# Patient Record
Sex: FEMALE | Race: WHITE | Employment: OTHER | URBAN - METROPOLITAN AREA
[De-identification: names, ages, dates, MRNs, and addresses within clinical notes are randomized per-mention and may not be internally consistent; named-entity substitution may affect disease eponyms.]

---

## 2017-01-05 ENCOUNTER — OFFICE VISIT (OUTPATIENT)
Dept: FAMILY MEDICINE CLINIC | Age: 62
End: 2017-01-05

## 2017-01-05 VITALS
BODY MASS INDEX: 34 KG/M2 | OXYGEN SATURATION: 96 % | DIASTOLIC BLOOD PRESSURE: 81 MMHG | TEMPERATURE: 98.6 F | WEIGHT: 173.2 LBS | SYSTOLIC BLOOD PRESSURE: 124 MMHG | RESPIRATION RATE: 14 BRPM | HEIGHT: 60 IN | HEART RATE: 78 BPM

## 2017-01-05 DIAGNOSIS — I10 ESSENTIAL HYPERTENSION: ICD-10-CM

## 2017-01-05 DIAGNOSIS — E78.5 HYPERLIPIDEMIA LDL GOAL <100: ICD-10-CM

## 2017-01-05 DIAGNOSIS — R73.02 GLUCOSE INTOLERANCE (IMPAIRED GLUCOSE TOLERANCE): ICD-10-CM

## 2017-01-05 DIAGNOSIS — Z11.59 NEED FOR HEPATITIS C SCREENING TEST: ICD-10-CM

## 2017-01-05 DIAGNOSIS — Z00.00 WELL ADULT EXAM: Primary | ICD-10-CM

## 2017-01-05 DIAGNOSIS — R63.5 ABNORMAL WEIGHT GAIN: ICD-10-CM

## 2017-01-05 DIAGNOSIS — Z12.11 SCREENING FOR COLORECTAL CANCER: ICD-10-CM

## 2017-01-05 DIAGNOSIS — Z12.12 SCREENING FOR COLORECTAL CANCER: ICD-10-CM

## 2017-01-05 NOTE — MR AVS SNAPSHOT
Visit Information Date & Time Provider Department Dept. Phone Encounter #  
 1/5/2017  8:00 AM Ar Mondragon, [address] [facility] [phone] [id number] Follow-up Instructions Return in about 2 months (around 3/5/2017) for weight management with Dr. Hallie Cardona. Upcoming Health Maintenance Date Due ZOSTER VACCINE AGE 60> 10/14/2015 COLONOSCOPY 6/13/2017 BREAST CANCER SCRN MAMMOGRAM 8/19/2018 DTaP/Tdap/Td series (2 - Td) 1/14/2019 PAP AKA CERVICAL CYTOLOGY 1/5/2020 Allergies as of 1/5/2017  Review Complete On: 1/5/2017 By: Ar Mondragon, DO No Known Allergies Current Immunizations  Reviewed on 5/14/2013 Name Date Influenza Vaccine 11/22/2016, 12/18/2012 TDAP Vaccine 1/14/2009 Not reviewed this visit You Were Diagnosed With   
  
 Codes Comments Well adult exam    -  Primary ICD-10-CM: Z00.00 ICD-9-CM: V70.0 Need for hepatitis C screening test     ICD-10-CM: Z11.59 
ICD-9-CM: V73.89 Glucose intolerance (impaired glucose tolerance)     ICD-10-CM: R73.02 
ICD-9-CM: 790.22 Hyperlipidemia LDL goal <100     ICD-10-CM: E78.5 ICD-9-CM: 272.4 Essential hypertension     ICD-10-CM: I10 
ICD-9-CM: 401.9 Abnormal weight gain     ICD-10-CM: R63.5 ICD-9-CM: 783.1 Screening for colorectal cancer     ICD-10-CM: Z12.11, Z12.12 
ICD-9-CM: V76.51 Vitals BP Pulse Temp Resp Height(growth percentile) Weight(growth percentile) 124/81 (BP 1 Location: Left arm, BP Patient Position: Sitting) 78 98.6 °F (37 °C) (Oral) 14 5' (1.524 m) 173 lb 3.2 oz (78.6 kg) LMP SpO2 BMI OB Status Smoking Status 07/23/2013 96% 33.83 kg/m2 Postmenopausal Never Smoker Vitals History BMI and BSA Data Body Mass Index Body Surface Area  
 33.83 kg/m 2 1.82 m 2 Preferred Pharmacy Pharmacy Name Phone [id number] [address] [place] [address] 9082 [phone] Your Updated Medication List  
  
   
This list is accurate as of: 1/5/17  8:42 AM.  Always use your most recent med list.  
  
  
  
  
 aspirin 81 mg chewable tablet Take 81 mg by mouth daily. Indications: MYOCARDIAL INFARCTION PREVENTION  
  
 FISH OIL PO Take  by mouth daily. GLUCOSAMINE-CONDROITIN-HRB#182 PO Take 3,000 mg by mouth daily. MOTRIN  mg tablet Generic drug:  ibuprofen Take 600 mg by mouth every six (6) hours as needed. Indications: PAIN  
  
 naltrexone-buPROPion 8-90 mg Tber ER tablet Commonly known as:  Marguarite Arthur Take 2 Tabs by mouth two (2) times a day. simvastatin 40 mg tablet Commonly known as:  ZOCOR  
TAKE 1 TABLET BY MOUTH NIGHTLY.  
  
 valsartan-hydroCHLOROthiazide 320-12.5 mg per tablet Commonly known as:  DIOVAN-HCT  
TAKE 1 TABLET BY MOUTH EVERY DAY Prescriptions Sent to Pharmacy Refills  
 naltrexone-buPROPion (CONTRAVE) 8-90 mg TbER ER tablet 2 Sig: Take 2 Tabs by mouth two (2) times a day. Class: Normal  
 Pharmacy: [id number] Shaknar Francisco, [address] [facility] #: [phone] Route: Oral  
  
Follow-up Instructions Return in about 2 months (around 3/5/2017) for weight management with Dr. Rivera Roberts. To-Do List   
 09/06/2017 7:30 AM  
  Appointment with [address] [place] [address] 1 at [address] [place] [address] ([phone]) Shower or bathe using soap and water. Do not use deodorant, powder, perfumes, or lotion the day of your exam.  If your prior mammograms were not performed at [facility] 6 please bring films with you or forward prior images 2 days before your procedure. Check in at registration 15min before your appointment time unless you were instructed to do otherwise. A script is not necessary, but if you have one, please bring it on the day of the mammogram or have it faxed to the department. [facility] [phone] [facility]  [phone] [facility] [address] [place]  [phone] [facility] [phone] [facility] 1150 [facility] [phone] Referral Information Referral ID Referred By Referred To  
  
 [id number] Leslie Sue Not Available Visits Status Start Date End Date 1 New Request 1/5/17 1/5/18 If your referral has a status of pending review or denied, additional information will be sent to support the outcome of this decision. Patient Instructions Well Visit, Women 48 to 72: Care Instructions Your Care Instructions Physical exams can help you stay healthy. Your doctor has checked your overall health and may have suggested ways to take good care of yourself. He or she also may have recommended tests. At home, you can help prevent illness with healthy eating, regular exercise, and other steps. Follow-up care is a key part of your treatment and safety. Be sure to make and go to all appointments, and call your doctor if you are having problems. It's also a good idea to know your test results and keep a list of the medicines you take. How can you care for yourself at home? · Reach and stay at a healthy weight. This will lower your risk for many problems, such as obesity, diabetes, heart disease, and high blood pressure. · Get at least 30 minutes of exercise on most days of the week. Walking is a good choice. You also may want to do other activities, such as running, swimming, cycling, or playing tennis or team sports. · Do not smoke. Smoking can make health problems worse. If you need help quitting, talk to your doctor about stop-smoking programs and medicines. These can increase your chances of quitting for good. · Protect your skin from too much sun. When you're outdoors from 10 a.m. to 4 p.m., stay in the shade or cover up with clothing and a hat with a wide brim. Wear sunglasses that block UV rays.  Even when it's cloudy, put broad-spectrum sunscreen (SPF 30 or higher) on any exposed skin. · See a dentist one or two times a year for checkups and to have your teeth cleaned. · Wear a seat belt in the car. · Limit alcohol to 1 drink a day. Too much alcohol can cause health problems. Follow your doctor's advice about when to have certain tests. These tests can spot problems early. · Cholesterol. Your doctor will tell you how often to have this done based on your age, family history, or other things that can increase your risk for heart attack and stroke. · Blood pressure. Have your blood pressure checked during a routine doctor visit. Your doctor will tell you how often to check your blood pressure based on your age, your blood pressure results, and other factors. · Mammogram. Ask your doctor how often you should have a mammogram, which is an X-ray of your breasts. A mammogram can spot breast cancer before it can be felt and when it is easiest to treat. · Pap test and pelvic exam. Ask your doctor how often you should have a Pap test. You may not need to have a Pap test as often as you used to. · Vision. Have your eyes checked every year or two or as often as your doctor suggests. Some experts recommend that you have yearly exams for glaucoma and other age-related eye problems starting at age 48. · Hearing. Tell your doctor if you notice any change in your hearing. You can have tests to find out how well you hear. · Diabetes. Ask your doctor whether you should have tests for diabetes. · Colon cancer. You should begin tests for colon cancer at age 48. You may have one of several tests. Your doctor will tell you how often to have tests based on your age and risk. Risks include whether you already had a precancerous polyp removed from your colon or whether your parents, sisters and brothers, or children have had colon cancer. · Thyroid disease.  Talk to your doctor about whether to have your thyroid checked as part of a regular physical exam. Women have an increased chance of a thyroid problem. · Osteoporosis. You should begin tests for bone density at age 72. If you are younger than 72, ask your doctor whether you have factors that may increase your risk for this disease. You may want to have this test before age 72. · Heart attack and stroke risk. At least every 4 to 6 years, you should have your risk for heart attack and stroke assessed. Your doctor uses factors such as your age, blood pressure, cholesterol, and whether you smoke or have diabetes to show what your risk for a heart attack or stroke is over the next 10 years. When should you call for help? Watch closely for changes in your health, and be sure to contact your doctor if you have any problems or symptoms that concern you. Where can you learn more? Go to http://brett-nataliya.info/. Enter F073 in the search box to learn more about \"Well Visit, Women 50 to 72: Care Instructions. \" Current as of: July 19, 2016 Content Version: 11.1 © 7103-1340 [employer]rec. Care instructions adapted under license by [employer] (which disclaims liability or warranty for this information). If you have questions about a medical condition or this instruction, always ask your healthcare professional. [place] [address] any warranty or liability for your use of this information. Introducing [facility]! Dear Vinod Funes: Thank you for requesting a Circular account. Our records indicate that you already have an active Circular account. You can access your account anytime at [email or web address] Did you know that you can access your hospital and ER discharge instructions at any time in Circular? You can also review all of your test results from your hospital stay or ER visit. Additional Information If you have questions, please visit the Frequently Asked Questions section of the [facility] website at [email or web address] Remember, [facility] is NOT to be used for urgent needs. For medical emergencies, dial 911. Now available from your iPhone and Android! Please provide this summary of care documentation to your next provider. Your primary care clinician is listed as Matteo Rea. If you have any questions after today's visit, please call [phone].

## 2017-01-05 NOTE — PROGRESS NOTES
SUBJECTIVE:   64 y.o. female for annual checkup AND follow up to chronic conditions. Patients last menstrual period was  Patient's last menstrual period was 07/23/2013. Last PAP: 5/2013, NIL with neg HPV, by Dr. Veronica Gallegos    OBGYN: Sanjay Montague     History of abnormal: none    Had [facility] 12/2015 year for lower abdominal pain, cytology was negative    Saw urology, OBGYN; was due to cystitis     Family history of ovarian, uterine or cervical cancer: none    Next due: per OBGYN    Genito-Urinary ROS: no dysuria, trouble voiding, or hematuria    Last Mammogram: 8/29/2016    FINDINGS:   BREAST COMPOSITION: There are scattered fibroglandular densities   (approximately 25-50% glandular).     There is mild asymmetry. There are no new dominant masses, clustered   calcifications, skin changes or nipple changes which suggest malignancy.        IMPRESSION:  1. BIRADS ASSESSMENT CATEGORY 2, benign. 2. Followup mammography is recommended annually over 40, unless earlier   suggested clinically. 3. These findings will be relayed to the patient promptly. Family history of breast cancer: none      Last DEXA: 8/19/2015    IMPRESSION:     This patient is osteopenic using the World Health Organization criteria  As compared to the prior study, there has been no statistically significant   change.   10 year probability of major osteoporotic fracture: 8.4%  10 year probability of hip fracture: 0.8%    History of abnormal DEXA: yes, but has not needed or met guidelines/recommendations for medication therapy yet    On calcium and vitamin D: no  Family history of osteoporosis: not sure     Daily weight bearing activity: yes      History of STD: none    STD risk factors:   Lifetime sexual partners: 2  Men only   No history of IV drug use or blood transfusion    Hep C screening in past ([facility]): has not had in the past     Last Colonoscopy: 6/13/2007, \"normal\" per EMR report  Next due: 6/2017    Family history of colon cancer or polyp disease: none    Gastrointestinal ROS: no abdominal pain, change in bowel habits, or black or bloody stools    OTHER concerns or chronic conditions:    1.2.3   HTN and HLD and weight gain     Diet and Lifestyle: got new job, brother , lost insurance at a period of time; has insurance again and ready to get back on track so restarted Contrave at last visit     Very isolated in [place], previously on [employer] watchers      Living in short pump     Has gym at work; working out during lunch and body pump T and TR     Home BP Monitoring: is well controlled at home     Cardiovascular ROS: taking medications as instructed, no medication side effects noted, no TIA's, no chest pain on exertion, no dyspnea on exertion, no swelling of ankles.      Weight gain     Wt Readings from Last 3 Encounters:   17 173 lb 3.2 oz (78.6 kg)   16 175 lb 3.2 oz (79.5 kg)   10/18/16 182 lb 6.4 oz (82.7 kg)       Patient previously on contrave, stopped this because felt that was not working for her, was not suppressing appetite in the past.      Restarted contrave on 10/18/16     Start weight: 182 lbs  Current weight 173 lbs  Weight loss in last 4 weeks:2 lbs  Total weight loss: 9 lbs with contrave (5% of start weight)     Current dose: 2 tabs PO BID     Tolerating well and denies adverse SE    Medications for above conditions:     Medication Sig    naltrexone-buPROPion (CONTRAVE) 8-90 mg TbER ER tablet Take 2 Tabs by mouth two (2) times a day.  valsartan-hydrochlorothiazide (DIOVAN-HCT) 320-12.5 mg per tablet TAKE 1 TABLET BY MOUTH EVERY DAY    simvastatin (ZOCOR) 40 mg tablet TAKE 1 TABLET BY MOUTH NIGHTLY.  aspirin 81 mg chewable tablet Take 81 mg by mouth daily.  Indications: MYOCARDIAL INFARCTION PREVENTION       Reviewed following labs with patient:     Lab Results  Component Value Date/Time   Hemoglobin A1c 5.9 10/22/2016 08:47 AM   Hemoglobin A1c 5.7 2016 05:51 PM   Hemoglobin A1c 5.9 07/07/2015 09:35 AM   Glucose 114 10/22/2016 08:47 AM   LDL, calculated 125 10/22/2016 08:47 AM   Creatinine 0.95 10/22/2016 08:47 AM      Lab Results  Component Value Date/Time   ALT 32 10/22/2016 08:47 AM   AST 32 10/22/2016 08:47 AM   Alk. phosphatase 63 10/22/2016 08:47 AM   Bilirubin, total 0.9 10/22/2016 08:47 AM        Due for repeat A1C today      Past Medical History   Diagnosis Date    Basal cell carcinoma      Dr Oscar Mata toe 04/20/2015     Great toe of L foot     Degenerative arthritis of knee      L  Li/os    DUB (dysfunctional uterine bleeding) 2005    Hypercholesterolemia      heart scan ca 0 2004    Hyperglycemia     Hypertension     Osteopenia 2/11     repeat  DEXA 2/13    Unspecified vitamin D deficiency 6/10     Current Outpatient Prescriptions   Medication Sig Dispense Refill    naltrexone-buPROPion (CONTRAVE) 8-90 mg TbER ER tablet Take 2 Tabs by mouth two (2) times a day. 120 Tab 2    valsartan-hydrochlorothiazide (DIOVAN-HCT) 320-12.5 mg per tablet TAKE 1 TABLET BY MOUTH EVERY DAY 90 Tab 3    simvastatin (ZOCOR) 40 mg tablet TAKE 1 TABLET BY MOUTH NIGHTLY. 90 Tab 3    GLUCOSAMINE-CONDROITIN-HRB#182 PO Take 3,000 mg by mouth daily.  ibuprofen (MOTRIN IB) 200 mg tablet Take 600 mg by mouth every six (6) hours as needed. Indications: PAIN      aspirin 81 mg chewable tablet Take 81 mg by mouth daily. Indications: MYOCARDIAL INFARCTION PREVENTION      DOCOSAHEXANOIC ACID/EPA (FISH OIL PO) Take  by mouth daily. Allergies: Review of patient's allergies indicates no known allergies. OBJECTIVE:   The patient appears well, alert, oriented x 3, in no distress. Visit Vitals    /81 (BP 1 Location: Left arm, BP Patient Position: Sitting)    Pulse 78    Temp 98.6 °F (37 °C) (Oral)    Resp 14    Ht 5' (1.524 m)    Wt 173 lb 3.2 oz (78.6 kg)    LMP 07/23/2013    SpO2 96%    BMI 33.83 kg/m2     ENT: MMM. Neck: supple.  No adenopathy or thyromegaly. Eyes: SHELIA. Pulm: Lungs are clear, good air entry, no wheezes, rhonchi or rales. CV: S1 and S2 normal, no murmurs, regular rate and rhythm. Lymph: show no edema  Vasc: normal peripheral pulses. Neurological is normal, no focal findings. CN 2-12 intact    ASSESSMENT/PLAN:       ICD-10-CM ICD-9-CM    1. Well adult exam Z00.00 V70.0 HCV AB W/RFLX TO KIKI      REFERRAL FOR COLONOSCOPY   2. Need for hepatitis C screening test Z11.59 V73.89 HCV AB W/RFLX TO KIKI   3. Glucose intolerance (impaired glucose tolerance) R73.02 790.22 HEMOGLOBIN A1C WITH EAG   4. Hyperlipidemia LDL goal <100 E78.5 272.4    5. Essential hypertension I10 401.9    6. Abnormal weight gain R63.5 783.1 naltrexone-buPROPion (CONTRAVE) 8-90 mg TbER ER tablet  Refilled today    Patient has lost 5% of start body weight which is indication to continue medication. It is advise that she continues on this medication until she reaches BMI of <25, she can additionally use this medication long term for managing obesity as long as weight is either stable or decreasing. Discussed that she should continue healthy diet, exercise. 7. Screening for colorectal cancer Z12.11 V76.51 REFERRAL FOR COLONOSCOPY    Z12.12       RX given for Zostavax. Counseled on risk and benefit of this vaccine. Next due:     PAP: 5/2018  Mammogram: 8/2017 (is already set up for 9/2017)  DEXA: 8/2017  Colonoscopy: 7/2017    Follow-up Disposition:  Return in about 2 months (around 3/5/2017) for weight management with Dr. Clarisa Estrada. Dr. Ashley Wise D.O.   Physician

## 2017-01-05 NOTE — PATIENT INSTRUCTIONS
Well Visit, Women 48 to 72: Care Instructions  Your Care Instructions  Physical exams can help you stay healthy. Your doctor has checked your overall health and may have suggested ways to take good care of yourself. He or she also may have recommended tests. At home, you can help prevent illness with healthy eating, regular exercise, and other steps. Follow-up care is a key part of your treatment and safety. Be sure to make and go to all appointments, and call your doctor if you are having problems. It's also a good idea to know your test results and keep a list of the medicines you take. How can you care for yourself at home? · Reach and stay at a healthy weight. This will lower your risk for many problems, such as obesity, diabetes, heart disease, and high blood pressure. · Get at least 30 minutes of exercise on most days of the week. Walking is a good choice. You also may want to do other activities, such as running, swimming, cycling, or playing tennis or team sports. · Do not smoke. Smoking can make health problems worse. If you need help quitting, talk to your doctor about stop-smoking programs and medicines. These can increase your chances of quitting for good. · Protect your skin from too much sun. When you're outdoors from 10 a.m. to 4 p.m., stay in the shade or cover up with clothing and a hat with a wide brim. Wear sunglasses that block UV rays. Even when it's cloudy, put broad-spectrum sunscreen (SPF 30 or higher) on any exposed skin. · See a dentist one or two times a year for checkups and to have your teeth cleaned. · Wear a seat belt in the car. · Limit alcohol to 1 drink a day. Too much alcohol can cause health problems. Follow your doctor's advice about when to have certain tests. These tests can spot problems early. · Cholesterol.  Your doctor will tell you how often to have this done based on your age, family history, or other things that can increase your risk for heart attack and stroke. · Blood pressure. Have your blood pressure checked during a routine doctor visit. Your doctor will tell you how often to check your blood pressure based on your age, your blood pressure results, and other factors. · Mammogram. Ask your doctor how often you should have a mammogram, which is an X-ray of your breasts. A mammogram can spot breast cancer before it can be felt and when it is easiest to treat. · Pap test and pelvic exam. Ask your doctor how often you should have a Pap test. You may not need to have a Pap test as often as you used to. · Vision. Have your eyes checked every year or two or as often as your doctor suggests. Some experts recommend that you have yearly exams for glaucoma and other age-related eye problems starting at age 48. · Hearing. Tell your doctor if you notice any change in your hearing. You can have tests to find out how well you hear. · Diabetes. Ask your doctor whether you should have tests for diabetes. · Colon cancer. You should begin tests for colon cancer at age 48. You may have one of several tests. Your doctor will tell you how often to have tests based on your age and risk. Risks include whether you already had a precancerous polyp removed from your colon or whether your parents, sisters and brothers, or children have had colon cancer. · Thyroid disease. Talk to your doctor about whether to have your thyroid checked as part of a regular physical exam. Women have an increased chance of a thyroid problem. · Osteoporosis. You should begin tests for bone density at age 72. If you are younger than 72, ask your doctor whether you have factors that may increase your risk for this disease. You may want to have this test before age 72. · Heart attack and stroke risk. At least every 4 to 6 years, you should have your risk for heart attack and stroke assessed.  Your doctor uses factors such as your age, blood pressure, cholesterol, and whether you smoke or have diabetes to show what your risk for a heart attack or stroke is over the next 10 years. When should you call for help? Watch closely for changes in your health, and be sure to contact your doctor if you have any problems or symptoms that concern you. Where can you learn more? Go to http://brett-nataliya.info/. Enter C856 in the search box to learn more about \"Well Visit, Women 50 to 72: Care Instructions. \"  Current as of: July 19, 2016  Content Version: 11.1  © 5172-9559 [employer]. Care instructions adapted under license by [employer] (which disclaims liability or warranty for this information). If you have questions about a medical condition or this instruction, always ask your healthcare professional. [address] any warranty or liability for your use of this information.

## 2017-01-05 NOTE — PROGRESS NOTES
Chief Complaint   Patient presents with    Complete Physical       1. Have you been to the ER, urgent care clinic since your last visit? Hospitalized since your last visit? No    2. Have you seen or consulted any other health care providers outside of the [address] [place] [address] since your last visit? Include any pap smears or colon screening. No    Body mass index is 33.83 kg/(m^2).

## 2017-01-06 LAB
EST. AVERAGE GLUCOSE BLD GHB EST-MCNC: 131 MG/DL
HBA1C MFR BLD: 6.2 % (ref 4.8–5.6)
HCV AB S/CO SERPL IA: <0.1 S/CO RATIO (ref 0–0.9)
HCV AB SERPL QL IA: NORMAL

## 2017-01-06 RX ORDER — METFORMIN HYDROCHLORIDE 500 MG/1
500 TABLET, EXTENDED RELEASE ORAL
Qty: 90 TAB | Refills: 1 | Status: SHIPPED | OUTPATIENT
Start: 2017-01-06 | End: 2017-07-07 | Stop reason: SDUPTHER

## 2017-01-06 NOTE — PROGRESS NOTES
A1C (marker for diabetes) rising even with her weight loss, which means that she may progress toward diabetes even while losing weight. I recommend adding on metformin at this point to stop progression to diabetes and this will help some with weight loss (additional 4% of body weight on average). Is she willing to do this?       CV

## 2017-01-06 NOTE — PROGRESS NOTES
Adding METFORMIN  mg PO with dinner     A1C (marker for diabetes) rising even with her weight loss, which means that she may progress toward diabetes even while losing weight. I recommend adding on metformin at this point to stop progression to diabetes and this will help some with weight loss (additional 4% of body weight on average). Repeat A1C at next visit     Dr. Marcelino Dukes D.O.   Physician

## 2017-01-06 NOTE — PROGRESS NOTES
Advised patient of results and recommendations, patient understood and agreed to understanding. Patient is agreeable to starting Metformin,[facility] p[facility] to you.

## 2017-01-06 NOTE — PROGRESS NOTES
Attempted to reach patient, left voicemail to call the office to review results and recommendations.

## 2017-02-22 ENCOUNTER — HOSPITAL ENCOUNTER (EMERGENCY)
Age: 62
Discharge: HOME | End: 2017-02-22
Attending: FAMILY MEDICINE

## 2017-02-22 VITALS
DIASTOLIC BLOOD PRESSURE: 93 MMHG | BODY MASS INDEX: 34.36 KG/M2 | OXYGEN SATURATION: 99 % | HEIGHT: 60 IN | WEIGHT: 175 LBS | TEMPERATURE: 98.4 F | SYSTOLIC BLOOD PRESSURE: 145 MMHG | HEART RATE: 73 BPM | RESPIRATION RATE: 16 BRPM

## 2017-02-22 DIAGNOSIS — H10.021 PINK EYE, RIGHT: Primary | ICD-10-CM

## 2017-02-22 RX ORDER — SULFACETAMIDE SODIUM 100 MG/ML
1-2 SOLUTION/ DROPS OPHTHALMIC EVERY 4 HOURS
Qty: 1 BOTTLE | Refills: 0 | Status: SHIPPED | OUTPATIENT
Start: 2017-02-22 | End: 2017-02-27

## 2017-02-22 NOTE — DISCHARGE INSTRUCTIONS
Pinkeye: Care Instructions  Your Care Instructions    Pinkeye is redness and swelling of the eye surface and the conjunctiva (the lining of the eyelid and the covering of the white part of the eye). Pinkeye is also called conjunctivitis. Pinkeye is often caused by infection with bacteria or a virus. Dry air, allergies, smoke, and chemicals are other common causes. Pinkeye often clears on its own in 7 to 10 days. Antibiotics only help if the pinkeye is caused by bacteria. Pinkeye caused by infection spreads easily. If an allergy or chemical is causing pinkeye, it will not go away unless you can avoid whatever is causing it. Follow-up care is a key part of your treatment and safety. Be sure to make and go to all appointments, and call your doctor if you are having problems. Its also a good idea to know your test results and keep a list of the medicines you take. How can you care for yourself at home? · Wash your hands often. Always wash them before and after you treat pinkeye or touch your eyes or face. · Use moist cotton or a clean, wet cloth to remove crust. Wipe from the inside corner of the eye to the outside. Use a clean part of the cloth for each wipe. · Put cold or warm wet cloths on your eye a few times a day if the eye hurts. · Do not wear contact lenses or eye makeup until the pinkeye is gone. Throw away any eye makeup you were using when you got pinkeye. Clean your contacts and storage case. If you wear disposable contacts, use a new pair when your eye has cleared and it is safe to wear contacts again. · If the doctor gave you antibiotic ointment or eyedrops, use them as directed. Use the medicine for as long as instructed, even if your eye starts looking better soon. Keep the bottle tip clean, and do not let it touch the eye area. · To put in eyedrops or ointment:  ¨ Tilt your head back, and pull your lower eyelid down with one finger.   ¨ Drop or squirt the medicine inside the lower lid.  ¨ Close your eye for 30 to 60 seconds to let the drops or ointment move around. ¨ Do not touch the ointment or dropper tip to your eyelashes or any other surface. · Do not share towels, pillows, or washcloths while you have pinkeye. When should you call for help? Call your doctor now or seek immediate medical care if:  · You have pain in your eye, not just irritation on the surface. · You have a change in vision or loss of vision. · You have an increase in discharge from the eye. · Your eye has not started to improve or begins to get worse within 48 hours after you start using antibiotics. · Pinkeye lasts longer than 7 days. Watch closely for changes in your health, and be sure to contact your doctor if you have any problems. Where can you learn more? Go to http://brett-nataliya.info/. Enter Y392 in the search box to learn more about \"Pinkeye: Care Instructions. \"  Current as of: May 27, 2016  Content Version: 11.1  © 3758-6892 Healthwise, Incorporated. Care instructions adapted under license by [employer] (which disclaims liability or warranty for this information). If you have questions about a medical condition or this instruction, always ask your healthcare professional. [address] any warranty or liability for your use of this information.

## 2017-02-22 NOTE — LETTER
[facility] 
[address] [place] [address] 
[phone] Work/School Note Date: 2/22/2017 To Whom It May concern: 
 
Levon Dominique was seen and treated today in the emergency room by the following provider(s): 
Attending Provider: Marisol Davila MD 
Physician Assistant: Gordo Mccray. Levon Dominique may return to work on 02/24/17. Sincerely, Gordo Mccray

## 2017-02-22 NOTE — UC PROVIDER NOTE
Patient is a 64 y.o. female presenting with conjunctivitis. The history is provided by the patient. Pink Eye    This is a new problem. The current episode started 3 to 5 hours ago. The problem occurs constantly. The problem has been gradually worsening. The right eye is affected. The injury mechanism was none. The patient is experiencing no pain. There is no history of trauma to the eye. There is no known exposure to pink eye. She does not wear contacts. Associated symptoms include discharge, foreign body sensation and eye redness. Pertinent negatives include no blurred vision.         Past Medical History:   Diagnosis Date    Basal cell carcinoma     Dr Aaliyah Sesay toe 04/20/2015    Great toe of L foot     Degenerative arthritis of knee     L  Li/os    DUB (dysfunctional uterine bleeding) 2005    Hypercholesterolemia     heart scan ca 0 2004    Hyperglycemia     Hypertension     Osteopenia 2/11    repeat  DEXA 2/13    Unspecified vitamin D deficiency 6/10        Past Surgical History:   Procedure Laterality Date    COLONOSCOPY  2007    repeat 2017    HX DILATION AND CURETTAGE Bilateral          Family History   Problem Relation Age of Onset    Elevated Lipids Mother     Hypertension Mother     Cancer Maternal Grandmother      cervical    Diabetes Maternal Grandmother     Heart Disease Maternal Grandfather      MI        Social History     Social History    Marital status:      Spouse name: N/A    Number of children: N/A    Years of education: N/A     Occupational History     [employer]     Social History Main Topics    Smoking status: Never Smoker    Smokeless tobacco: Never Used    Alcohol use 0.6 oz/week     1 Glasses of wine per week      Comment: rarely    Drug use: No    Sexual activity: Yes     Partners: Male     Birth control/ protection: None     Other Topics Concern    Exercise Yes     exercises regularly     Social History Narrative ALLERGIES: Review of patient's allergies indicates no known allergies. Review of Systems   Constitutional: Negative for chills. Eyes: Positive for discharge and redness. Negative for blurred vision. Vitals:    02/22/17 0820 02/22/17 0822   BP:  (!) 145/93   Pulse:  73   Resp:  16   Temp:  98.4 °F (36.9 °C)   SpO2:  99%   Weight: 79.4 kg (175 lb)    Height: 5' (1.524 m)        Physical Exam   Constitutional: She is oriented to person, place, and time. She appears well-developed and well-nourished. HENT:   Right Ear: External ear normal.   Left Ear: External ear normal.   Eyes:   Right conjunctive injected   Pulmonary/Chest: Effort normal.   Neurological: She is alert and oriented to person, place, and time. Skin: Skin is warm and dry. Psychiatric: She has a normal mood and affect. Her behavior is normal. Judgment and thought content normal.   Nursing note and vitals reviewed. MDM     Differential Diagnosis; Clinical Impression; Plan:     CLINICAL IMPRESSION:  Pink eye, right  (primary encounter diagnosis)    Plan:  1. Bleph-10  2.   3.   Risk of Significant Complications, Morbidity, and/or Mortality:   Presenting problems: Moderate  Diagnostic procedures: Moderate  Management options:   Moderate  Progress:   Patient progress:  Stable      Procedures

## 2017-02-23 ENCOUNTER — PATIENT OUTREACH (OUTPATIENT)
Dept: FAMILY MEDICINE CLINIC | Age: 62
End: 2017-02-23

## 2017-02-23 NOTE — PATIENT INSTRUCTIONS
Pinkeye: Care Instructions  Your Care Instructions    Pinkeye is redness and swelling of the eye surface and the conjunctiva (the lining of the eyelid and the covering of the white part of the eye). Pinkeye is also called conjunctivitis. Pinkeye is often caused by infection with bacteria or a virus. Dry air, allergies, smoke, and chemicals are other common causes. Pinkeye often clears on its own in 7 to 10 days. Antibiotics only help if the pinkeye is caused by bacteria. Pinkeye caused by infection spreads easily. If an allergy or chemical is causing pinkeye, it will not go away unless you can avoid whatever is causing it. Follow-up care is a key part of your treatment and safety. Be sure to make and go to all appointments, and call your doctor if you are having problems. Its also a good idea to know your test results and keep a list of the medicines you take. How can you care for yourself at home? · Wash your hands often. Always wash them before and after you treat pinkeye or touch your eyes or face. · Use moist cotton or a clean, wet cloth to remove crust. Wipe from the inside corner of the eye to the outside. Use a clean part of the cloth for each wipe. · Put cold or warm wet cloths on your eye a few times a day if the eye hurts. · Do not wear contact lenses or eye makeup until the pinkeye is gone. Throw away any eye makeup you were using when you got pinkeye. Clean your contacts and storage case. If you wear disposable contacts, use a new pair when your eye has cleared and it is safe to wear contacts again. · If the doctor gave you antibiotic ointment or eyedrops, use them as directed. Use the medicine for as long as instructed, even if your eye starts looking better soon. Keep the bottle tip clean, and do not let it touch the eye area. · To put in eyedrops or ointment:  ¨ Tilt your head back, and pull your lower eyelid down with one finger.   ¨ Drop or squirt the medicine inside the lower lid.  ¨ Close your eye for 30 to 60 seconds to let the drops or ointment move around. ¨ Do not touch the ointment or dropper tip to your eyelashes or any other surface. · Do not share towels, pillows, or washcloths while you have pinkeye. When should you call for help? Call your doctor now or seek immediate medical care if:  · You have pain in your eye, not just irritation on the surface. · You have a change in vision or loss of vision. · You have an increase in discharge from the eye. · Your eye has not started to improve or begins to get worse within 48 hours after you start using antibiotics. · Pinkeye lasts longer than 7 days. Watch closely for changes in your health, and be sure to contact your doctor if you have any problems. Where can you learn more? Go to http://brett-nataliya.info/. Enter Y392 in the search box to learn more about \"Pinkeye: Care Instructions. \"  Current as of: May 27, 2016  Content Version: 11.1  © 6998-3666 Healthwise, Incorporated. Care instructions adapted under license by [place]  (which disclaims liability or warranty for this information). If you have questions about a medical condition or this instruction, always ask your healthcare professional. [address] any warranty or liability for your use of this information.

## 2017-02-23 NOTE — LETTER
2/23/2017 10:11 AM 
 
Ms. Molly Chicas [facility] [id number] Dear José Antonio Ann, 
 
 
I am a Nurse Navigator working with your physician's office. Part of my job is to follow up with patients who have been in the emergency department, urgent care or hospital to see how they are feeling, answer any questions they may have about their visit and also make sure they have a follow-up appointment to see their primary care doctor and/or specialist. 
If you are not improving as you feel you should, please call our office to schedule a follow up appointment. If I can be of any help with questions or concerns, you can give me a call at the number listed above on this letter. Thank you for allowing us to participate in your care!  
 
Sincerely, 
 
 
Kathi Coulter RN

## 2017-02-23 NOTE — PROGRESS NOTES
Patient seen at [facility] 2/22 for c/o right eye red and crusty. Dx with pink eye. Given rx- Sulfacetamide opth drops q 4 hours x 5 days. F/u if no better. Letter sent to patient to f/u if no better, NN contact number included if any questions or concerns.

## 2017-03-02 ENCOUNTER — OFFICE VISIT (OUTPATIENT)
Dept: FAMILY MEDICINE CLINIC | Age: 62
End: 2017-03-02

## 2017-03-02 VITALS
SYSTOLIC BLOOD PRESSURE: 120 MMHG | WEIGHT: 174.5 LBS | TEMPERATURE: 98.4 F | DIASTOLIC BLOOD PRESSURE: 82 MMHG | HEIGHT: 60 IN | BODY MASS INDEX: 34.26 KG/M2 | RESPIRATION RATE: 14 BRPM | HEART RATE: 65 BPM | OXYGEN SATURATION: 97 %

## 2017-03-02 DIAGNOSIS — I10 HTN, GOAL BELOW 150/90: ICD-10-CM

## 2017-03-02 DIAGNOSIS — R73.02 GLUCOSE INTOLERANCE (IMPAIRED GLUCOSE TOLERANCE): ICD-10-CM

## 2017-03-02 NOTE — PROGRESS NOTES
Patient Name: Gabe Lopes   MRN: [id number]    Lois Kumari is a 64 y.o. female who presents with the following: Transferring care from prior PCP Dr. Elliott Figueredo. Patient here today for weight management. Per prior PCP, patient started on Contrave on 10/2016; previously was on it several years ago with improvement but unsure if it still working. Stopped it 2 weeks ago due to lack of weight loss and cost.  Diet/exercise: Belongs to a gym but does not go often. Is not complaint with diet; feels like she can't help herself but eat sometimes. Is in [employer] but information given is repetitive at this point.     Started on metformin  mg since last OV due to increasing A1C but not yet diabetes criteria. Tolerating metformin well. Lab Results   Component Value Date/Time    Hemoglobin A1c 6.2 01/05/2017 08:56 AM     Tolerating BP meds well. Denies CP, SOB, or leg edema. BP Readings from Last 3 Encounters:   03/02/17 120/82   02/22/17 (!) 145/93   01/05/17 124/81       Review of Systems   Constitutional: Negative for fever, malaise/fatigue and weight loss. Respiratory: Negative for cough, hemoptysis, shortness of breath and wheezing. Cardiovascular: Negative for chest pain, palpitations, leg swelling and PND. Gastrointestinal: Negative for abdominal pain, constipation, diarrhea, nausea and vomiting. The patient's medications, allergies, past medical history, surgical history, family history and social history were reviewed and updated where appropriate. Prior to Admission medications    Medication Sig Start Date End Date Taking? Authorizing Provider   metFORMIN ER (GLUCOPHAGE XR) 500 mg tablet Take 1 Tab by mouth daily (with dinner). 1/6/17  Yes Irma Ruiz,    valsartan-hydrochlorothiazide (DIOVAN-HCT) 320-12.5 mg per tablet TAKE 1 TABLET BY MOUTH EVERY DAY 10/25/16  Yes Irma Ruiz DO   simvastatin (ZOCOR) 40 mg tablet TAKE 1 TABLET BY MOUTH NIGHTLY. 10/25/16  Yes Minh Peck DO   GLUCOSAMINE-CONDROITIN-HRB#182 PO Take 3,000 mg by mouth daily. Yes Historical Provider   ibuprofen (MOTRIN IB) 200 mg tablet Take 600 mg by mouth every six (6) hours as needed. Indications: PAIN   Yes Historical Provider   aspirin 81 mg chewable tablet Take 81 mg by mouth daily. Indications: MYOCARDIAL INFARCTION PREVENTION   Yes Historical Provider   DOCOSAHEXANOIC ACID/EPA (FISH OIL PO) Take  by mouth daily. Yes Historical Provider   naltrexone-buPROPion (CONTRAVE) 8-90 mg TbER ER tablet Take 2 Tabs by mouth two (2) times a day.  1/5/17   Minh Peck DO       No Known Allergies      Past Medical History:   Diagnosis Date    Basal cell carcinoma     Dr Joanna Izquierdo toe 04/20/2015    Great toe of L foot     Degenerative arthritis of knee     L  Li/os    DUB (dysfunctional uterine bleeding) 2005    Glucose intolerance (impaired glucose tolerance) 1/9/2015    A1C=6% on 1/9/15     HTN, goal below 150/90     Hypercholesterolemia     heart scan ca 0 2004    Osteopenia 2/11    repeat  DEXA 2/13    Unspecified vitamin D deficiency 6/10       Past Surgical History:   Procedure Laterality Date    COLONOSCOPY  2007    repeat 2017    HX DILATION AND CURETTAGE Bilateral        Family History   Problem Relation Age of Onset    Elevated Lipids Mother     Hypertension Mother     Cancer Maternal Grandmother      cervical    Diabetes Maternal Grandmother     Heart Disease Maternal Grandfather      MI    Diabetes Father     Elevated Lipids Father     Hypertension Father        Social History     Social History    Marital status:      Spouse name: N/A    Number of children: N/A    Years of education: N/A     Occupational History     [employer]     Social History Main Topics    Smoking status: Never Smoker    Smokeless tobacco: Never Used    Alcohol use 0.6 oz/week     1 Glasses of wine per week      Comment: rarely    Drug use: No    Sexual activity: Yes     Partners: Male     Birth control/ protection: None     Other Topics Concern    Exercise Yes     exercises regularly     Social History Narrative           OBJECTIVE    Visit Vitals    /82 (BP 1 Location: Left arm, BP Patient Position: Sitting)    Pulse 65    Temp 98.4 °F (36.9 °C) (Oral)    Resp 14    Ht 5' (1.524 m)    Wt 174 lb 8 oz (79.2 kg)    LMP 07/23/2013    SpO2 97%    BMI 34.08 kg/m2       Physical Exam   Constitutional: She is well-developed, well-nourished, and in no distress. No distress. HENT:   Head: Normocephalic and atraumatic. Right Ear: External ear normal.   Left Ear: External ear normal.   Eyes: Conjunctivae and EOM are normal. Pupils are equal, round, and reactive to light. Cardiovascular: Normal rate, regular rhythm and normal heart sounds. Exam reveals no gallop and no friction rub. No murmur heard. Pulmonary/Chest: Effort normal and breath sounds normal. No respiratory distress. She has no wheezes. Skin: She is not diaphoretic. Psychiatric: Mood, memory, affect and judgment normal.   Nursing note and vitals reviewed. ASSESSMENT AND PLAN  Jazmyn Montoya is a 64 y.o. female who presents today for:    1. BMI 34.0-34.9,adult  Pt would like to discuss other med options with Dr. Isidro swanson; referral given. She is unsure if she would like to continue medications but would appreciate Dr. Thalia Gallo input. [facility] program referral given today.  - REFERRAL TO BARIATRICS    2. Glucose intolerance (impaired glucose tolerance)  Stable, continue current treatment pending review of labs.    - HEMOGLOBIN A1C WITH EAG    3. HTN, goal below 150/90  Stable, continue current treatment.  - BASIC METABOLIC PANEL (7)       Medications Discontinued During This Encounter   Medication Reason    naltrexone-buPROPion (CONTRAVE) 8-90 mg TbER ER tablet Not A Current Medication       Follow-up Disposition:  Return in about 3 months (around 6/2/2017) for HTN follow up.    Medication risks/benefits/costs/interactions/alternatives discussed with patient. Advised patient to call back or return to office if symptoms worsen/change/persist. If patient cannot reach us or should anything more severe/urgent arise he/she should proceed directly to the nearest emergency department. Discussed expected course/resolution/complications of diagnosis in detail with patient. Patient given a written after visit summary which includes his/her diagnoses, current medications and vitals. Patient expressed understanding with the diagnosis and plan.      Julian Jeffery M.D.

## 2017-03-02 NOTE — PATIENT INSTRUCTIONS

## 2017-03-02 NOTE — MR AVS SNAPSHOT
Visit Information Date & Time Provider Department Dept. Phone Encounter #  
 3/2/2017  8:00 AM Aletha Luo MD [facility] [phone] [id number] Follow-up Instructions Return in about 3 months (around 6/2/2017) for HTN follow up. Upcoming Health Maintenance Date Due COLONOSCOPY 6/13/2017 BREAST CANCER SCRN MAMMOGRAM 8/19/2018 DTaP/Tdap/Td series (2 - Td) 1/14/2019 PAP AKA CERVICAL CYTOLOGY 1/5/2020 Allergies as of 3/2/2017  Review Complete On: 3/2/2017 By: Aletha Luo MD  
 No Known Allergies Current Immunizations  Reviewed on 3/2/2017 Name Date Influenza Vaccine 11/22/2016, 12/18/2012 TDAP Vaccine 1/14/2009 Zoster Vaccine, Live 1/5/2017 Reviewed by Cailin Torres LPN on 6/4/4529 at  8:46 AM  
You Were Diagnosed With   
  
 Codes Comments Abnormal weight gain    -  Primary ICD-10-CM: R63.5 ICD-9-CM: 783.1 HTN, goal below 150/90     ICD-10-CM: I10 
ICD-9-CM: 401.9 Vitals BP  
  
  
  
  
  
 120/82 (BP 1 Location: Left arm, BP Patient Position: Sitting) BMI and BSA Data Body Mass Index Body Surface Area 34.08 kg/m 2 1.83 m 2 Preferred Pharmacy Pharmacy Name Phone [id number] [address] [id number] [phone] Your Updated Medication List  
  
   
This list is accurate as of: 3/2/17  8:37 AM.  Always use your most recent med list.  
  
  
  
  
 aspirin 81 mg chewable tablet Take 81 mg by mouth daily. Indications: MYOCARDIAL INFARCTION PREVENTION  
  
 FISH OIL PO Take  by mouth daily. GLUCOSAMINE-CONDROITIN-HRB#182 PO Take 3,000 mg by mouth daily. metFORMIN  mg tablet Commonly known as:  GLUCOPHAGE XR Take 1 Tab by mouth daily (with dinner). MOTRIN  mg tablet Generic drug:  ibuprofen Take 600 mg by mouth every six (6) hours as needed.     Indications: PAIN  
  
 simvastatin 40 mg tablet Commonly known as:  ZOCOR  
TAKE 1 TABLET BY MOUTH NIGHTLY.  
  
 valsartan-hydroCHLOROthiazide 320-12.5 mg per tablet Commonly known as:  DIOVAN-HCT  
TAKE 1 TABLET BY MOUTH EVERY DAY We Performed the Following BASIC METABOLIC PANEL (7) [23260 CPT(R)] HEMOGLOBIN A1C WITH EAG [02073 CPT(R)] REFERRAL TO BARIATRICS [XXZ737 CPT(R)] Comments:  
 Kirk Michelle Follow-up Instructions Return in about 3 months (around 6/2/2017) for HTN follow up. To-Do List   
 09/06/2017 7:30 AM  
  Appointment with [facility] 1 at [address] [place] [address] ([phone]) Shower or bathe using soap and water. Do not use deodorant, powder, perfumes, or lotion the day of your exam.  If your prior mammograms were not performed at [facility] please bring films with you or forward prior images 2 days before your procedure. Check in at registration 15min before your appointment time unless you were instructed to do otherwise. A script is not necessary, but if you have one, please bring it on the day of the mammogram or have it faxed to the department. [facility] [phone] [facility]  [phone] [facility] [address]  [phone] [facility] [phone] [address] [phone] Referral Information Referral ID Referred By Referred To  
  
 [id number] Jaycee Zamudio MD   
   [facility], [address] [place] [address] Phone: [phone] Fax: [phone] Visits Status Start Date End Date 1 New Request 3/2/17 3/2/18 If your referral has a status of pending review or denied, additional information will be sent to support the outcome of this decision. Patient Instructions DASH Diet: Care Instructions Your Care Instructions The DASH diet is an eating plan that can help lower your blood pressure. DASH stands for Dietary Approaches to Stop Hypertension. Hypertension is high blood pressure. The DASH diet focuses on eating foods that are high in calcium, potassium, and magnesium. These nutrients can lower blood pressure. The foods that are highest in these nutrients are fruits, vegetables, low-fat dairy products, nuts, seeds, and legumes. But taking calcium, potassium, and magnesium supplements instead of eating foods that are high in those nutrients does not have the same effect. The DASH diet also includes whole grains, fish, and poultry. The DASH diet is one of several lifestyle changes your doctor may recommend to lower your high blood pressure. Your doctor may also want you to decrease the amount of sodium in your diet. Lowering sodium while following the DASH diet can lower blood pressure even further than just the DASH diet alone. Follow-up care is a key part of your treatment and safety. Be sure to make and go to all appointments, and call your doctor if you are having problems. It's also a good idea to know your test results and keep a list of the medicines you take. How can you care for yourself at home? Following the DASH diet · Eat 4 to 5 servings of fruit each day. A serving is 1 medium-sized piece of fruit, ½ cup chopped or canned fruit, 1/4 cup dried fruit, or 4 ounces (½ cup) of fruit juice. Choose fruit more often than fruit juice. · Eat 4 to 5 servings of vegetables each day. A serving is 1 cup of lettuce or raw leafy vegetables, ½ cup of chopped or cooked vegetables, or 4 ounces (½ cup) of vegetable juice. Choose vegetables more often than vegetable juice. · Get 2 to 3 servings of low-fat and fat-free dairy each day. A serving is 8 ounces of milk, 1 cup of yogurt, or 1 ½ ounces of cheese. · Eat 6 to 8 servings of grains each day. A serving is 1 slice of bread, 1 ounce of dry cereal, or ½ cup of cooked rice, pasta, or cooked cereal. Try to choose whole-grain products as much as possible. · Limit lean meat, poultry, and fish to 2 servings each day.  A serving is 3 ounces, about the size of a deck of cards. · Eat 4 to 5 servings of nuts, seeds, and legumes (cooked dried beans, lentils, and split peas) each week. A serving is 1/3 cup of nuts, 2 tablespoons of seeds, or ½ cup of cooked beans or peas. · Limit fats and oils to 2 to 3 servings each day. A serving is 1 teaspoon of vegetable oil or 2 tablespoons of salad dressing. · Limit sweets and added sugars to 5 servings or less a week. A serving is 1 tablespoon jelly or jam, ½ cup sorbet, or 1 cup of lemonade. · Eat less than 2,300 milligrams (mg) of sodium a day. If you limit your sodium to 1,500 mg a day, you can lower your blood pressure even more. Tips for success · Start small. Do not try to make dramatic changes to your diet all at once. You might feel that you are missing out on your favorite foods and then be more likely to not follow the plan. Make small changes, and stick with them. Once those changes become habit, add a few more changes. · Try some of the following: ¨ Make it a goal to eat a fruit or vegetable at every meal and at snacks. This will make it easy to get the recommended amount of fruits and vegetables each day. ¨ Try yogurt topped with fruit and nuts for a snack or healthy dessert. ¨ Add lettuce, tomato, cucumber, and onion to sandwiches. ¨ Combine a ready-made pizza crust with low-fat mozzarella cheese and lots of vegetable toppings. Try using tomatoes, squash, spinach, broccoli, carrots, cauliflower, and onions. ¨ Have a variety of cut-up vegetables with a low-fat dip as an appetizer instead of chips and dip. ¨ Sprinkle sunflower seeds or chopped almonds over salads. Or try adding chopped walnuts or almonds to cooked vegetables. ¨ Try some vegetarian meals using beans and peas. Add garbanzo or kidney beans to salads. Make burritos and tacos with mashed pimentel beans or black beans. Where can you learn more? Go to http://west-nataliya.info/. Enter I758 in the search box to learn more about \"DASH Diet: Care Instructions. \" Current as of: March 23, 2016 Content Version: 11.1 © 3078-1463 [email or web address]. Care instructions adapted under license by [employer] (which disclaims liability or warranty for this information). If you have questions about a medical condition or this instruction, always ask your healthcare professional. [address] any warranty or liability for your use of this information. Introducing [facility]! Dear Angel Citizen: Thank you for requesting a 9+ account. Our records indicate that you already have an active 9+ account. You can access your account anytime at [email or web address] Did you know that you can access your hospital and ER discharge instructions at any time in 9+? You can also review all of your test results from your hospital stay or ER visit. Additional Information If you have questions, please visit the Frequently Asked Questions section of the 9+ website at [email or web address] Remember, 9+ is NOT to be used for urgent needs. For medical emergencies, dial 911. Now available from your iPhone and Android! Please provide this summary of care documentation to your next provider. Your primary care clinician is listed as Jay Watkins. If you have any questions after today's visit, please call [phone].

## 2017-03-02 NOTE — PROGRESS NOTES
Patient presents in office today for weight management follow up and establish care-yuan transfer  Patient stopped contrave 2 weeks ago-too expensive and not working    Chief Complaint   Patient presents with    Weight Management    Establish Care     yuan transfer     1. Have you been to the ER, urgent care clinic since your last visit? Hospitalized since your last visit? No    2. Have you seen or consulted any other health care providers outside of the [address] [place] [address] since your last visit? Include any pap smears or colon screening.  No     PHQ 2 / 9, over the last two weeks 3/2/2017   Little interest or pleasure in doing things Not at all   Feeling down, depressed or hopeless Not at all   Total Score PHQ 2 0     Vitals:    03/02/17 0812   BP: 120/82   BP 1 Location: Left arm   BP Patient Position: Sitting   Pulse: 65   Resp: 14   Temp: 98.4 °F (36.9 °C)   TempSrc: Oral   SpO2: 97%   Weight: 174 lb 8 oz (79.2 kg)   Height: 5' (1.524 m)

## 2017-03-03 LAB
BUN SERPL-MCNC: 18 MG/DL (ref 8–27)
BUN/CREAT SERPL: 21 (ref 11–26)
CHLORIDE SERPL-SCNC: 98 MMOL/L (ref 96–106)
CO2 SERPL-SCNC: 23 MMOL/L (ref 18–29)
CREAT SERPL-MCNC: 0.87 MG/DL (ref 0.57–1)
EST. AVERAGE GLUCOSE BLD GHB EST-MCNC: 117 MG/DL
GLUCOSE SERPL-MCNC: 101 MG/DL (ref 65–99)
HBA1C MFR BLD: 5.7 % (ref 4.8–5.6)
POTASSIUM SERPL-SCNC: 4.4 MMOL/L (ref 3.5–5.2)
SODIUM SERPL-SCNC: 141 MMOL/L (ref 134–144)

## 2017-07-24 ENCOUNTER — OFFICE VISIT (OUTPATIENT)
Dept: FAMILY MEDICINE CLINIC | Age: 62
End: 2017-07-24

## 2017-07-24 VITALS
RESPIRATION RATE: 16 BRPM | HEIGHT: 62 IN | HEART RATE: 59 BPM | OXYGEN SATURATION: 92 % | SYSTOLIC BLOOD PRESSURE: 128 MMHG | TEMPERATURE: 98.2 F | WEIGHT: 184.6 LBS | BODY MASS INDEX: 33.97 KG/M2 | DIASTOLIC BLOOD PRESSURE: 84 MMHG

## 2017-07-24 DIAGNOSIS — R73.02 GLUCOSE INTOLERANCE (IMPAIRED GLUCOSE TOLERANCE): Primary | ICD-10-CM

## 2017-07-24 DIAGNOSIS — R20.0 HAND NUMBNESS: ICD-10-CM

## 2017-07-24 LAB — HBA1C MFR BLD HPLC: 5.8 %

## 2017-07-24 NOTE — PATIENT INSTRUCTIONS
Prediabetes: Care Instructions  Your Care Instructions  Prediabetes is a warning sign that you are at risk for getting type 2 diabetes. It means that your blood sugar is higher than it should be. The food you eat turns into sugar, which your body uses for energy. Normally, an organ called the pancreas makes insulin, which allows the sugar in your blood to get into your body's cells. But when your body can't use insulin the right way, the sugar doesn't move into cells. It stays in your blood instead. This is called insulin resistance. The buildup of sugar in the blood causes prediabetes. The good news is that lifestyle changes may help you get your blood sugar back to normal and help you avoid or delay diabetes. Follow-up care is a key part of your treatment and safety. Be sure to make and go to all appointments, and call your doctor if you are having problems. It's also a good idea to know your test results and keep a list of the medicines you take. How can you care for yourself at home? · Watch your weight. A healthy weight helps your body use insulin properly. · Limit the amount of calories, sweets, and unhealthy fat you eat. Ask your doctor if you should see a dietitian. A registered dietitian can help you create meal plans that fit your lifestyle. · Get at least 30 minutes of exercise on most days of the week. Exercise helps control your blood sugar. It also helps you maintain a healthy weight. Walking is a good choice. You also may want to do other activities, such as running, swimming, cycling, or playing tennis or team sports. · Do not smoke. Smoking can make prediabetes worse. If you need help quitting, talk to your doctor about stop-smoking programs and medicines. These can increase your chances of quitting for good. · If your doctor prescribed medicines, take them exactly as prescribed. Call your doctor if you think you are having a problem with your medicine.  You will get more details on the specific medicines your doctor prescribes. When should you call for help? Watch closely for changes in your health, and be sure to contact your doctor if:  · You have any symptoms of diabetes. These may include:  ¨ Being thirsty more often. ¨ Urinating more. ¨ Being hungrier. ¨ Losing weight. ¨ Being very tired. ¨ Having blurry vision. · You have a wound that will not heal.  · You have an infection that will not go away. · You have problems with your blood pressure. · You want more information about diabetes and how you can keep from getting it. Where can you learn more? Go to http://brett-nataliya.info/. Enter I222 in the search box to learn more about \"Prediabetes: Care Instructions. \"  Current as of: March 13, 2017  Content Version: 11.3  © 9356-8582 [employer]wise[employer]. Care instructions adapted under license by [employer] (which disclaims liability or warranty for this information). If you have questions about a medical condition or this instruction, always ask your healthcare professional. [address] any warranty or liability for your use of this information.

## 2017-07-24 NOTE — PROGRESS NOTES
Patient Name: Robert Leyva   MRN: [id number]    Tessa Kelley is a 64 y.o. female who presents with the following:     She is seen for pre-diabetes. Since last visit she reports no chest pain, dyspnea or TIA's, no numbness, tingling or pain in extremities, no unusual visual symptoms, no medication side effects noted, no significant changes. Home glucose monitoring: is not performed. She reports medication compliance: compliant all of the time. Medication side effects: none. Diabetic diet compliance: noncompliant much of the time. Lab review: A1c today is 5.9. Hx of pre-dm, started on metformin by prior PCP. Lab Results   Component Value Date/Time    Hemoglobin A1c 5.7 03/02/2017 08:42 AM     Has had several years hx of bilateral hand pain/numbness which seems to wake her up at night. Denies weakness, arm symptoms, or neck symptoms. Temporarily improved after elevated her neck with pillows. Review of Systems   Constitutional: Negative for fever, malaise/fatigue and weight loss. Respiratory: Negative for cough, hemoptysis, shortness of breath and wheezing. Cardiovascular: Negative for chest pain, palpitations, leg swelling and PND. Gastrointestinal: Negative for abdominal pain, constipation, diarrhea, nausea and vomiting. Neurological: Positive for tingling. Negative for dizziness, speech change, focal weakness and seizures. The patient's medications, allergies, past medical history, surgical history, family history and social history were reviewed and updated where appropriate. Prior to Admission medications    Medication Sig Start Date End Date Taking? Authorizing Provider   metFORMIN ER (GLUCOPHAGE XR) 500 mg tablet Take 1 Tab by mouth daily (with dinner).  7/7/17  Yes Jenny Flowers MD   valsartan-hydrochlorothiazide (DIOVAN-HCT) 320-12.5 mg per tablet TAKE 1 TABLET BY MOUTH EVERY DAY 10/25/16  Yes Najma Bear DO   simvastatin (ZOCOR) 40 mg tablet TAKE 1 TABLET BY MOUTH NIGHTLY. 10/25/16  Yes Emma Alejandre DO   GLUCOSAMINE-CONDROITIN-HRB#182 PO Take 3,000 mg by mouth daily. Yes Historical Provider   ibuprofen (MOTRIN IB) 200 mg tablet Take 600 mg by mouth every six (6) hours as needed. Indications: PAIN   Yes Historical Provider   aspirin 81 mg chewable tablet Take 81 mg by mouth daily. Indications: MYOCARDIAL INFARCTION PREVENTION   Yes Historical Provider   DOCOSAHEXANOIC ACID/EPA (FISH OIL PO) Take  by mouth daily. Yes Historical Provider       No Known Allergies        OBJECTIVE    Visit Vitals    /84 (BP 1 Location: Left arm, BP Patient Position: Sitting)    Pulse (!) 59    Temp 98.2 °F (36.8 °C) (Oral)    Resp 16    Ht 5' 1.52\" (1.563 m)    Wt 184 lb 9.6 oz (83.7 kg)    LMP 07/23/2013    SpO2 92%    BMI 34.29 kg/m2       Physical Exam   Constitutional: She is oriented to person, place, and time and well-developed, well-nourished, and in no distress. No distress. HENT:   Head: Normocephalic and atraumatic. Right Ear: External ear normal.   Left Ear: External ear normal.   Eyes: Conjunctivae and EOM are normal. Pupils are equal, round, and reactive to light. Musculoskeletal:        Right wrist: Normal. She exhibits normal range of motion, no tenderness, no crepitus and no deformity. Left wrist: Normal. She exhibits normal range of motion, no tenderness, no crepitus and no deformity. Negative Phalen's/Tinel's bilaterally. Neurological: She is alert and oriented to person, place, and time. She exhibits normal muscle tone. Gait normal.   Skin: She is not diaphoretic. Psychiatric: Mood, memory, affect and judgment normal.   Nursing note and vitals reviewed. ASSESSMENT AND PLAN  Maegan Larose is a 64 y.o. female who presents today for:    1. Glucose intolerance (impaired glucose tolerance)  Stable, continue current treatment.  Pt will try to work on diet/exercise and consider d/c metformin at next visit if A1C stable then. - AMB POC HEMOGLOBIN A1C    2. Hand numbness  Recommend bilateral wrist splints nightly for possible CTS. There are no discontinued medications. Follow-up Disposition:  Return in about 3 months (around 10/24/2017) for pre-DM. Medication risks/benefits/costs/interactions/alternatives discussed with patient. Advised patient to call back or return to office if symptoms worsen/change/persist. If patient cannot reach us or should anything more severe/urgent arise he/she should proceed directly to the nearest emergency department. Discussed expected course/resolution/complications of diagnosis in detail with patient. Patient given a written after visit summary which includes his/her diagnoses, current medications and vitals. Patient expressed understanding with the diagnosis and plan.      Ney Laguna M.D.

## 2017-07-24 NOTE — MR AVS SNAPSHOT
Visit Information Date & Time Provider Department Dept. Phone Encounter #  
 7/24/2017  1:45 PM Gillian Chang MD [address] [place] [address] [phone] [id number] Follow-up Instructions Return in about 3 months (around 10/24/2017) for pre-DM. Upcoming Health Maintenance Date Due COLONOSCOPY 6/13/2017 INFLUENZA AGE 9 TO ADULT 8/1/2017 Bone Densitometry 8/19/2017 BREAST CANCER SCRN MAMMOGRAM 8/19/2018 DTaP/Tdap/Td series (2 - Td) 1/14/2019 PAP AKA CERVICAL CYTOLOGY 1/5/2020 Allergies as of 7/24/2017  Review Complete On: 6/11/5154 By: Alessandra Shin LPN No Known Allergies Current Immunizations  Reviewed on 3/2/2017 Name Date Influenza Vaccine 11/22/2016, 12/18/2012 TDAP Vaccine 1/14/2009 Zoster Vaccine, Live 1/5/2017 Not reviewed this visit You Were Diagnosed With   
  
 Codes Comments Glucose intolerance (impaired glucose tolerance)    -  Primary ICD-10-CM: R73.02 
ICD-9-CM: 790.22 Vitals BP Pulse Temp Resp Height(growth percentile) Weight(growth percentile) 128/84 (BP 1 Location: Left arm, BP Patient Position: Sitting) (!) 59 98.2 °F (36.8 °C) (Oral) 16 5' 1.52\" (1.563 m) 184 lb 9.6 oz (83.7 kg) LMP SpO2 BMI OB Status Smoking Status 07/23/2013 92% 34.29 kg/m2 Postmenopausal Never Smoker BMI and BSA Data Body Mass Index Body Surface Area  
 34.29 kg/m 2 1.91 m 2 Preferred Pharmacy Pharmacy Name Phone [id number] [phone] Your Updated Medication List  
  
   
This list is accurate as of: 7/24/17  2:35 PM.  Always use your most recent med list.  
  
  
  
  
 aspirin 81 mg chewable tablet Take 81 mg by mouth daily. Indications: MYOCARDIAL INFARCTION PREVENTION  
  
 FISH OIL PO Take  by mouth daily. GLUCOSAMINE-CONDROITIN-HRB#182 PO Take 3,000 mg by mouth daily. metFORMIN  mg tablet Commonly known as:  GLUCOPHAGE XR Take 1 Tab by mouth daily (with dinner). MOTRIN  mg tablet Generic drug:  ibuprofen Take 600 mg by mouth every six (6) hours as needed. Indications: PAIN  
  
 simvastatin 40 mg tablet Commonly known as:  ZOCOR  
TAKE 1 TABLET BY MOUTH NIGHTLY.  
  
 valsartan-hydroCHLOROthiazide 320-12.5 mg per tablet Commonly known as:  DIOVAN-HCT  
TAKE 1 TABLET BY MOUTH EVERY DAY We Performed the Following AMB POC HEMOGLOBIN A1C [90141 CPT(R)] Follow-up Instructions Return in about 3 months (around 10/24/2017) for pre-DM. To-Do List   
 09/06/2017 7:30 AM  
  Appointment with [facility] QUANG 1 at [address] [place] [address] ([phone]) Shower or bathe using soap and water. Do not use deodorant, powder, perfumes, or lotion the day of your exam.  If your prior mammograms were not performed at [facility] 6 please bring films with you or forward prior images 2 days before your procedure. Check in at registration 15min before your appointment time unless you were instructed to do otherwise. A script is not necessary, but if you have one, please bring it on the day of the mammogram or have it faxed to the department. [facility] [phone] [facility]  [phone] [facility] 19 [address]ATH[address]  [phone] [facility] [phone] [address] [phone] Patient Instructions Prediabetes: Care Instructions Your Care Instructions Prediabetes is a warning sign that you are at risk for getting type 2 diabetes. It means that your blood sugar is higher than it should be. The food you eat turns into sugar, which your body uses for energy. Normally, an organ called the pancreas makes insulin, which allows the sugar in your blood to get into your body's cells. But when your body can't use insulin the right way, the sugar doesn't move into cells.  It stays in your blood instead. This is called insulin resistance. The buildup of sugar in the blood causes prediabetes. The good news is that lifestyle changes may help you get your blood sugar back to normal and help you avoid or delay diabetes. Follow-up care is a key part of your treatment and safety. Be sure to make and go to all appointments, and call your doctor if you are having problems. It's also a good idea to know your test results and keep a list of the medicines you take. How can you care for yourself at home? · Watch your weight. A healthy weight helps your body use insulin properly. · Limit the amount of calories, sweets, and unhealthy fat you eat. Ask your doctor if you should see a dietitian. A registered dietitian can help you create meal plans that fit your lifestyle. · Get at least 30 minutes of exercise on most days of the week. Exercise helps control your blood sugar. It also helps you maintain a healthy weight. Walking is a good choice. You also may want to do other activities, such as running, swimming, cycling, or playing tennis or team sports. · Do not smoke. Smoking can make prediabetes worse. If you need help quitting, talk to your doctor about stop-smoking programs and medicines. These can increase your chances of quitting for good. · If your doctor prescribed medicines, take them exactly as prescribed. Call your doctor if you think you are having a problem with your medicine. You will get more details on the specific medicines your doctor prescribes. When should you call for help? Watch closely for changes in your health, and be sure to contact your doctor if: 
· You have any symptoms of diabetes. These may include: ¨ Being thirsty more often. ¨ Urinating more. ¨ Being hungrier. ¨ Losing weight. ¨ Being very tired. ¨ Having blurry vision. · You have a wound that will not heal. 
· You have an infection that will not go away. · You have problems with your blood pressure. · You want more information about diabetes and how you can keep from getting it. Where can you learn more? Go to http://brett-nataliya.info/. Enter I222 in the search box to learn more about \"Prediabetes: Care Instructions. \" Current as of: March 13, 2017 Content Version: 11.3 © 7244-7138 [employer]. Care instructions adapted under license by ison furniture (which disclaims liability or warranty for this information). If you have questions about a medical condition or this instruction, always ask your healthcare professional. [address] any warranty or liability for your use of this information. Introducing [facility] & HEALTH SERVICES! Dear Juanito Medrano: Thank you for requesting a GenoSpace account. Our records indicate that you already have an active GenoSpace account. You can access your account anytime at [email or web address] Did you know that you can access your hospital and ER discharge instructions at any time in GenoSpace? You can also review all of your test results from your hospital stay or ER visit. Additional Information If you have questions, please visit the Frequently Asked Questions section of the GenoSpace website at [email or web address] Remember, GenoSpace is NOT to be used for urgent needs. For medical emergencies, dial 911. Now available from your iPhone and Android! Please provide this summary of care documentation to your next provider. Your primary care clinician is listed as Jay Watkins. If you have any questions after today's visit, please call [phone].

## 2017-07-24 NOTE — PROGRESS NOTES
Chief Complaint   Patient presents with    Blood sugar problem     follow up on A1c     1. Have you been to the ER, urgent care clinic since your last visit? Hospitalized since your last visit? No    2. Have you seen or consulted any other health care providers outside of the [address] [place] [address] since your last visit? Include any pap smears or colon screening.  No

## 2017-09-06 ENCOUNTER — HOSPITAL ENCOUNTER (OUTPATIENT)
Dept: MAMMOGRAPHY | Age: 62
Discharge: HOME | End: 2017-09-06
Attending: FAMILY MEDICINE
Payer: COMMERCIAL

## 2017-09-06 DIAGNOSIS — Z12.31 VISIT FOR SCREENING MAMMOGRAM: ICD-10-CM

## 2017-09-06 PROCEDURE — 77067 SCR MAMMO BI INCL CAD: CPT

## 2017-09-19 ENCOUNTER — OFFICE VISIT (OUTPATIENT)
Dept: BARIATRICS/WEIGHT MGMT | Age: 62
End: 2017-09-19

## 2017-09-19 DIAGNOSIS — E66.9 OBESITY, UNSPECIFIED OBESITY SEVERITY, UNSPECIFIED OBESITY TYPE: Primary | ICD-10-CM

## 2017-10-26 ENCOUNTER — OFFICE VISIT (OUTPATIENT)
Dept: FAMILY MEDICINE CLINIC | Age: 62
End: 2017-10-26

## 2017-10-26 VITALS
OXYGEN SATURATION: 97 % | TEMPERATURE: 98.5 F | HEIGHT: 62 IN | HEART RATE: 77 BPM | BODY MASS INDEX: 33.68 KG/M2 | WEIGHT: 183 LBS | RESPIRATION RATE: 18 BRPM | SYSTOLIC BLOOD PRESSURE: 106 MMHG | DIASTOLIC BLOOD PRESSURE: 82 MMHG

## 2017-10-26 DIAGNOSIS — R73.02 GLUCOSE INTOLERANCE (IMPAIRED GLUCOSE TOLERANCE): Primary | ICD-10-CM

## 2017-10-26 DIAGNOSIS — Z13.820 SCREENING FOR OSTEOPOROSIS: ICD-10-CM

## 2017-10-26 DIAGNOSIS — Z78.0 POSTMENOPAUSAL: ICD-10-CM

## 2017-10-26 DIAGNOSIS — Z23 ENCOUNTER FOR IMMUNIZATION: ICD-10-CM

## 2017-10-26 LAB — HBA1C MFR BLD HPLC: 5.6 %

## 2017-10-26 NOTE — PROGRESS NOTES
Patient Name: Rachel Vazquez   MRN: [id number]    Jack Austin is a 58 y.o. female who presents with the following: The patient has pre-DM. She reports taking medications as instructed, no medication side effects noted, no TIA's, no chest pain on exertion, no dyspnea on exertion, no swelling of ankles. Diet and Lifestyle: generally follows a low fat low cholesterol diet, generally follows a low sodium diet, exercises sporadically. Lab review: A1c today is 5.6. Lab Results   Component Value Date/Time    Hemoglobin A1c 5.7 03/02/2017 08:42 AM    Hemoglobin A1c (POC) 5.6 10/26/2017 04:27 PM       Review of Systems   Constitutional: Negative for fever, malaise/fatigue and weight loss. Respiratory: Negative for cough, hemoptysis, shortness of breath and wheezing. Cardiovascular: Negative for chest pain, palpitations, leg swelling and PND. Gastrointestinal: Negative for abdominal pain, constipation, diarrhea, nausea and vomiting. The patient's medications, allergies, past medical history, surgical history, family history and social history were reviewed and updated where appropriate. Prior to Admission medications    Medication Sig Start Date End Date Taking? Authorizing Provider   metFORMIN ER (GLUCOPHAGE XR) 500 mg tablet Take 1 Tab by mouth daily (with dinner). 7/7/17  Yes Joey Rose MD   valsartan-hydrochlorothiazide (DIOVAN-HCT) 320-12.5 mg per tablet TAKE 1 TABLET BY MOUTH EVERY DAY 10/25/16  Yes Ashley Wise DO   simvastatin (ZOCOR) 40 mg tablet TAKE 1 TABLET BY MOUTH NIGHTLY. 10/25/16  Yes Ashley Wise DO   GLUCOSAMINE-CONDROITIN-HRB#182 PO Take 3,000 mg by mouth daily. Yes Historical Provider   ibuprofen (MOTRIN IB) 200 mg tablet Take 600 mg by mouth every six (6) hours as needed. Indications: PAIN   Yes Historical Provider   aspirin 81 mg chewable tablet Take 81 mg by mouth daily.  Indications: MYOCARDIAL INFARCTION PREVENTION   Yes Historical Provider   DOCOSAHEXANOIC ACID/EPA (FISH OIL PO) Take  by mouth daily. Yes Historical Provider       No Known Allergies        OBJECTIVE    Visit Vitals    /82 (BP 1 Location: Right arm, BP Patient Position: Sitting)    Pulse 77    Temp 98.5 °F (36.9 °C) (Oral)    Resp 18    Ht 5' 1.52\" (1.563 m)    Wt 183 lb (83 kg)    LMP 07/23/2013    SpO2 97%    BMI 34 kg/m2       Physical Exam   Constitutional: She is oriented to person, place, and time and well-developed, well-nourished, and in no distress. No distress. HENT:   Head: Normocephalic and atraumatic. Right Ear: External ear normal.   Left Ear: External ear normal.   Eyes: Conjunctivae and EOM are normal. Pupils are equal, round, and reactive to light. Neurological: She is alert and oriented to person, place, and time. Gait normal.   Skin: She is not diaphoretic. Psychiatric: Mood, memory, affect and judgment normal.   Nursing note and vitals reviewed. ASSESSMENT AND PLAN  Molly Obrien is a 58 y.o. female who presents today for:    1. Glucose intolerance (impaired glucose tolerance)  Pt would like to stay on metformin through the holidays until d/c med; she would like to try alternative day dosing. If A1c improved at next visit, consider d/c.   - AMB POC HEMOGLOBIN A1C    2. Postmenopausal  - DEXA BONE DENSITY STUDY AXIAL; Future    3. Screening for osteoporosis  - DEXA BONE DENSITY STUDY AXIAL; Future    4. Encounter for immunization  - Influenza virus vaccine (QUADRIVALENT PRES FREE SYRINGE) IM (42082)  - [place] IMMUNIZ ADMIN,1 SINGLE/COMB VAC/TOXOID       There are no discontinued medications. Follow-up Disposition:  Return in about 3 months (around 1/26/2018) for CPE. Medication risks/benefits/costs/interactions/alternatives discussed with patient.   Advised patient to call back or return to office if symptoms worsen/change/persist. If patient cannot reach us or should anything more severe/urgent arise he/she should proceed directly to the nearest emergency department. Discussed expected course/resolution/complications of diagnosis in detail with patient. Patient given a written after visit summary which includes his/her diagnoses, current medications and vitals. Patient expressed understanding with the diagnosis and plan.      Marlene Alejo M.D.

## 2017-10-26 NOTE — MR AVS SNAPSHOT
Visit Information Date & Time Provider Department Dept. Phone Encounter #  
 10/26/2017  3:15 PM Bonita Knott MD [address] [place] [address] [phone] [id number] Follow-up Instructions Return in about 3 months (around 1/26/2018) for CPE. Upcoming Health Maintenance Date Due COLONOSCOPY 6/13/2017 INFLUENZA AGE 9 TO ADULT 8/1/2017 Bone Densitometry 8/19/2017 DTaP/Tdap/Td series (2 - Td) 1/14/2019 BREAST CANCER SCRN MAMMOGRAM 9/6/2019 PAP AKA CERVICAL CYTOLOGY 1/5/2020 Allergies as of 10/26/2017  Review Complete On: 10/26/2017 By: Bonita Knott MD  
 No Known Allergies Current Immunizations  Reviewed on 3/2/2017 Name Date Influenza Vaccine 11/22/2016, 12/18/2012 TDAP Vaccine 1/14/2009 Zoster Vaccine, Live 1/5/2017 Not reviewed this visit You Were Diagnosed With   
  
 Codes Comments Glucose intolerance (impaired glucose tolerance)    -  Primary ICD-10-CM: R73.02 
ICD-9-CM: 790.22 Postmenopausal     ICD-10-CM: Z78.0 ICD-9-CM: V49.81 Screening for osteoporosis     ICD-10-CM: Z13.820 ICD-9-CM: V82.81 Vitals BP Pulse Temp Resp Height(growth percentile) Weight(growth percentile) 106/82 (BP 1 Location: Right arm, BP Patient Position: Sitting) 77 98.5 °F (36.9 °C) (Oral) 18 5' 1.52\" (1.563 m) 183 lb (83 kg) LMP SpO2 BMI OB Status Smoking Status 07/23/2013 97% 34 kg/m2 Postmenopausal Never Smoker Vitals History BMI and BSA Data Body Mass Index Body Surface Area 34 kg/m 2 1.9 m 2 Preferred Pharmacy Pharmacy Name Phone [id number]- Mayte Herrera [facility] [phone] Your Updated Medication List  
  
   
This list is accurate as of: 10/26/17  4:26 PM.  Always use your most recent med list.  
  
  
  
  
 aspirin 81 mg chewable tablet Take 81 mg by mouth daily. Indications: MYOCARDIAL INFARCTION PREVENTION  
  
 FISH OIL PO Take  by mouth daily. GLUCOSAMINE-CONDROITIN-HRB#182 PO Take 3,000 mg by mouth daily. metFORMIN  mg tablet Commonly known as:  GLUCOPHAGE XR Take 1 Tab by mouth daily (with dinner). MOTRIN  mg tablet Generic drug:  ibuprofen Take 600 mg by mouth every six (6) hours as needed. Indications: PAIN  
  
 simvastatin 40 mg tablet Commonly known as:  ZOCOR  
TAKE 1 TABLET BY MOUTH NIGHTLY.  
  
 valsartan-hydroCHLOROthiazide 320-12.5 mg per tablet Commonly known as:  DIOVAN-HCT  
TAKE 1 TABLET BY MOUTH EVERY DAY We Performed the Following AMB POC HEMOGLOBIN A1C [55681 CPT(R)] Follow-up Instructions Return in about 3 months (around 1/26/2018) for CPE. To-Do List   
 10/26/2017 Imaging:  DEXA BONE DENSITY STUDY AXIAL Referral Information Referral ID Referred By Referred To  
  
 [id number] Tanvir Live Not Available Visits Status Start Date End Date 1 New Request 10/26/17 10/26/18 If your referral has a status of pending review or denied, additional information will be sent to support the outcome of this decision. Patient Instructions Prediabetes: Care Instructions Your Care Instructions Prediabetes is a warning sign that you are at risk for getting type 2 diabetes. It means that your blood sugar is higher than it should be. The food you eat turns into sugar, which your body uses for energy. Normally, an organ called the pancreas makes insulin, which allows the sugar in your blood to get into your body's cells. But when your body can't use insulin the right way, the sugar doesn't move into cells. It stays in your blood instead. This is called insulin resistance. The buildup of sugar in the blood causes prediabetes.  
The good news is that lifestyle changes may help you get your blood sugar back to normal and help you avoid or delay diabetes. Follow-up care is a key part of your treatment and safety. Be sure to make and go to all appointments, and call your doctor if you are having problems. It's also a good idea to know your test results and keep a list of the medicines you take. How can you care for yourself at home? · Watch your weight. A healthy weight helps your body use insulin properly. · Limit the amount of calories, sweets, and unhealthy fat you eat. Ask your doctor if you should see a dietitian. A registered dietitian can help you create meal plans that fit your lifestyle. · Get at least 30 minutes of exercise on most days of the week. Exercise helps control your blood sugar. It also helps you maintain a healthy weight. Walking is a good choice. You also may want to do other activities, such as running, swimming, cycling, or playing tennis or team sports. · Do not smoke. Smoking can make prediabetes worse. If you need help quitting, talk to your doctor about stop-smoking programs and medicines. These can increase your chances of quitting for good. · If your doctor prescribed medicines, take them exactly as prescribed. Call your doctor if you think you are having a problem with your medicine. You will get more details on the specific medicines your doctor prescribes. When should you call for help? Watch closely for changes in your health, and be sure to contact your doctor if: 
? · You have any symptoms of diabetes. These may include: ¨ Being thirsty more often. ¨ Urinating more. ¨ Being hungrier. ¨ Losing weight. ¨ Being very tired. ¨ Having blurry vision. ? · You have a wound that will not heal.  
? · You have an infection that will not go away. ? · You have problems with your blood pressure. ? · You want more information about diabetes and how you can keep from getting it. Where can you learn more? Go to http://brett-nataliya.info/. Enter I222 in the search box to learn more about \"Prediabetes: Care Instructions. \" Current as of: March 13, 2017 Content Version: 11.4 © 1302-1757 Dream[employer]. Care instructions adapted under license by logtrust (which disclaims liability or warranty for this information). If you have questions about a medical condition or this instruction, always ask your healthcare professional. [address] any warranty or liability for your use of this information. Introducing [facility] & HEALTH SERVICES! Dear Letty Jerez: Thank you for requesting a Muchasa account. Our records indicate that you already have an active Muchasa account. You can access your account anytime at [email or web address] Did you know that you can access your hospital and ER discharge instructions at any time in Muchasa? You can also review all of your test results from your hospital stay or ER visit. Additional Information If you have questions, please visit the Frequently Asked Questions section of the Muchasa website at [email or web address] Remember, Muchasa is NOT to be used for urgent needs. For medical emergencies, dial 911. Now available from your iPhone and Android! Please provide this summary of care documentation to your next provider. Your primary care clinician is listed as Jay Watkins. If you have any questions after today's visit, please call [phone].

## 2017-10-26 NOTE — PROGRESS NOTES
Chief Complaint   Patient presents with    High Blood Sugar     3 month check regarding pre-diabetes     1. Have you been to the ER, urgent care clinic since your last visit? Hospitalized since your last visit? No    2. Have you seen or consulted any other health care providers outside of the [address] [place] [address] since your last visit? Include any pap smears or colon screening.  No

## 2017-10-26 NOTE — PATIENT INSTRUCTIONS
Prediabetes: Care Instructions  Your Care Instructions    Prediabetes is a warning sign that you are at risk for getting type 2 diabetes. It means that your blood sugar is higher than it should be. The food you eat turns into sugar, which your body uses for energy. Normally, an organ called the pancreas makes insulin, which allows the sugar in your blood to get into your body's cells. But when your body can't use insulin the right way, the sugar doesn't move into cells. It stays in your blood instead. This is called insulin resistance. The buildup of sugar in the blood causes prediabetes. The good news is that lifestyle changes may help you get your blood sugar back to normal and help you avoid or delay diabetes. Follow-up care is a key part of your treatment and safety. Be sure to make and go to all appointments, and call your doctor if you are having problems. It's also a good idea to know your test results and keep a list of the medicines you take. How can you care for yourself at home? · Watch your weight. A healthy weight helps your body use insulin properly. · Limit the amount of calories, sweets, and unhealthy fat you eat. Ask your doctor if you should see a dietitian. A registered dietitian can help you create meal plans that fit your lifestyle. · Get at least 30 minutes of exercise on most days of the week. Exercise helps control your blood sugar. It also helps you maintain a healthy weight. Walking is a good choice. You also may want to do other activities, such as running, swimming, cycling, or playing tennis or team sports. · Do not smoke. Smoking can make prediabetes worse. If you need help quitting, talk to your doctor about stop-smoking programs and medicines. These can increase your chances of quitting for good. · If your doctor prescribed medicines, take them exactly as prescribed. Call your doctor if you think you are having a problem with your medicine.  You will get more details on the specific medicines your doctor prescribes. When should you call for help? Watch closely for changes in your health, and be sure to contact your doctor if:  ? · You have any symptoms of diabetes. These may include:  ¨ Being thirsty more often. ¨ Urinating more. ¨ Being hungrier. ¨ Losing weight. ¨ Being very tired. ¨ Having blurry vision. ? · You have a wound that will not heal.   ? · You have an infection that will not go away. ? · You have problems with your blood pressure. ? · You want more information about diabetes and how you can keep from getting it. Where can you learn more? Go to http://brett-nataliya.info/. Enter I222 in the search box to learn more about \"Prediabetes: Care Instructions. \"  Current as of: March 13, 2017  Content Version: 11.4  © 0000-2250 Fluidigm. Care instructions adapted under license by [employer] (which disclaims liability or warranty for this information). If you have questions about a medical condition or this instruction, always ask your healthcare professional. [address] any warranty or liability for your use of this information.

## 2017-11-20 NOTE — TELEPHONE ENCOUNTER
Pharmacy on file verified. .    .  Requested Prescriptions     Pending Prescriptions Disp Refills    valsartan-hydroCHLOROthiazide (DIOVAN-HCT) 320-12.5 mg per tablet 90 Tab 3     Sig: TAKE 1 TABLET BY MOUTH EVERY DAY    simvastatin (ZOCOR) 40 mg tablet 90 Tab 3     Sig: TAKE 1 TABLET BY MOUTH NIGHTLY.

## 2017-11-21 RX ORDER — SIMVASTATIN 40 MG/1
TABLET, FILM COATED ORAL
Qty: 90 TAB | Refills: 1 | Status: SHIPPED | OUTPATIENT
Start: 2017-11-21 | End: 2018-05-19 | Stop reason: SDUPTHER

## 2017-11-21 RX ORDER — VALSARTAN AND HYDROCHLOROTHIAZIDE 320; 12.5 MG/1; MG/1
TABLET, FILM COATED ORAL
Qty: 90 TAB | Refills: 1 | Status: SHIPPED | OUTPATIENT
Start: 2017-11-21 | End: 2018-04-17 | Stop reason: SDUPTHER

## 2017-11-30 NOTE — PROGRESS NOTES
Patient attended a Medically Supervised Weight Loss New Patient Orientation today where we discussed:  - New Direction Very Low Calorie Diet details  - Medical Supervision  - Nutrition education  - Cost  - Policies and compliance required for program enrollment.

## 2017-12-11 LAB — COLONOSCOPY, EXTERNAL: NORMAL

## 2018-01-26 ENCOUNTER — OFFICE VISIT (OUTPATIENT)
Dept: FAMILY MEDICINE CLINIC | Age: 63
End: 2018-01-26

## 2018-01-26 VITALS
HEIGHT: 60 IN | RESPIRATION RATE: 18 BRPM | TEMPERATURE: 97.9 F | BODY MASS INDEX: 35.46 KG/M2 | WEIGHT: 180.6 LBS | OXYGEN SATURATION: 98 % | SYSTOLIC BLOOD PRESSURE: 118 MMHG | HEART RATE: 61 BPM | DIASTOLIC BLOOD PRESSURE: 82 MMHG

## 2018-01-26 DIAGNOSIS — Z00.00 ROUTINE GENERAL MEDICAL EXAMINATION AT HEALTH CARE FACILITY: Primary | ICD-10-CM

## 2018-01-26 DIAGNOSIS — E78.00 HYPERCHOLESTEROLEMIA: ICD-10-CM

## 2018-01-26 DIAGNOSIS — R73.02 GLUCOSE INTOLERANCE (IMPAIRED GLUCOSE TOLERANCE): ICD-10-CM

## 2018-01-26 DIAGNOSIS — E55.9 VITAMIN D DEFICIENCY: ICD-10-CM

## 2018-01-26 DIAGNOSIS — I10 HTN, GOAL BELOW 150/90: ICD-10-CM

## 2018-01-26 RX ORDER — LEVOCETIRIZINE DIHYDROCHLORIDE 5 MG/1
5 TABLET, FILM COATED ORAL DAILY
COMMUNITY
Start: 2018-01-24 | End: 2019-11-25

## 2018-01-26 RX ORDER — PREDNISONE 20 MG/1
20 TABLET ORAL DAILY
COMMUNITY
Start: 2018-01-24 | End: 2018-04-20 | Stop reason: ALTCHOICE

## 2018-01-26 NOTE — PROGRESS NOTES
Chief Complaint   Patient presents with    Complete Physical     fasting     1. Have you been to the ER, urgent care clinic since your last visit? Hospitalized since your last visit? Yes, Urgent Care clinic due to a rash on right eyelid /1/24/18. 2. Have you seen or consulted any other health care providers outside of the [address] [place] [address] since your last visit? Include any pap smears or colon screening.  No

## 2018-01-26 NOTE — PATIENT INSTRUCTIONS
Well Visit, Women 48 to 72: Care Instructions  Your Care Instructions    Physical exams can help you stay healthy. Your doctor has checked your overall health and may have suggested ways to take good care of yourself. He or she also may have recommended tests. At home, you can help prevent illness with healthy eating, regular exercise, and other steps. Follow-up care is a key part of your treatment and safety. Be sure to make and go to all appointments, and call your doctor if you are having problems. It's also a good idea to know your test results and keep a list of the medicines you take. How can you care for yourself at home? · Reach and stay at a healthy weight. This will lower your risk for many problems, such as obesity, diabetes, heart disease, and high blood pressure. · Get at least 30 minutes of exercise on most days of the week. Walking is a good choice. You also may want to do other activities, such as running, swimming, cycling, or playing tennis or team sports. · Do not smoke. Smoking can make health problems worse. If you need help quitting, talk to your doctor about stop-smoking programs and medicines. These can increase your chances of quitting for good. · Protect your skin from too much sun. When you're outdoors from 10 a.m. to 4 p.m., stay in the shade or cover up with clothing and a hat with a wide brim. Wear sunglasses that block UV rays. Even when it's cloudy, put broad-spectrum sunscreen (SPF 30 or higher) on any exposed skin. · See a dentist one or two times a year for checkups and to have your teeth cleaned. · Wear a seat belt in the car. · Limit alcohol to 1 drink a day. Too much alcohol can cause health problems. Follow your doctor's advice about when to have certain tests. These tests can spot problems early. · Cholesterol.  Your doctor will tell you how often to have this done based on your age, family history, or other things that can increase your risk for heart attack and stroke. · Blood pressure. Have your blood pressure checked during a routine doctor visit. Your doctor will tell you how often to check your blood pressure based on your age, your blood pressure results, and other factors. · Mammogram. Ask your doctor how often you should have a mammogram, which is an X-ray of your breasts. A mammogram can spot breast cancer before it can be felt and when it is easiest to treat. · Pap test and pelvic exam. Ask your doctor how often you should have a Pap test. You may not need to have a Pap test as often as you used to. · Vision. Have your eyes checked every year or two or as often as your doctor suggests. Some experts recommend that you have yearly exams for glaucoma and other age-related eye problems starting at age 48. · Hearing. Tell your doctor if you notice any change in your hearing. You can have tests to find out how well you hear. · Diabetes. Ask your doctor whether you should have tests for diabetes. · Colon cancer. You should begin tests for colon cancer at age 48. You may have one of several tests. Your doctor will tell you how often to have tests based on your age and risk. Risks include whether you already had a precancerous polyp removed from your colon or whether your parents, sisters and brothers, or children have had colon cancer. · Thyroid disease. Talk to your doctor about whether to have your thyroid checked as part of a regular physical exam. Women have an increased chance of a thyroid problem. · Osteoporosis. You should begin tests for bone density at age 72. If you are younger than 72, ask your doctor whether you have factors that may increase your risk for this disease. You may want to have this test before age 72. · Heart attack and stroke risk. At least every 4 to 6 years, you should have your risk for heart attack and stroke assessed.  Your doctor uses factors such as your age, blood pressure, cholesterol, and whether you smoke or have diabetes to show what your risk for a heart attack or stroke is over the next 10 years. When should you call for help? Watch closely for changes in your health, and be sure to contact your doctor if you have any problems or symptoms that concern you. Where can you learn more? Go to http://brett-nataliya.info/. Enter E820 in the search box to learn more about \"Well Visit, Women 50 to 72: Care Instructions. \"  Current as of: May 12, 2017  Content Version: 11.4  © 8175-0259 Healthwise, Incorporated. Care instructions adapted under license by [employer] (which disclaims liability or warranty for this information). If you have questions about a medical condition or this instruction, always ask your healthcare professional. [address] any warranty or liability for your use of this information.

## 2018-01-26 NOTE — PROGRESS NOTES
Patient Name: Angeline Meléndez   MRN: [id number]    Mark Del Angel is a 58 y.o. female who presents with the following:     Cervical Cancer Screening: due after 5/2018; will reschedule. Colon Cancer Screening: UTD, will get records. Breast Cancer Screening: up to date,   DEXA: scheduled   Hep C: negative     CAD risk factors:  HTN: wnl on meds  Lipid: on statin  Lab Results   Component Value Date/Time    Cholesterol, total 197 10/22/2016 08:47 AM    HDL Cholesterol 43 10/22/2016 08:47 AM    LDL, calculated 125 10/22/2016 08:47 AM    VLDL, calculated 29 10/22/2016 08:47 AM    Triglyceride 143 10/22/2016 08:47 AM    CHOL/HDL Ratio 4.3 06/09/2010 08:09 AM     DM: hx of pre DM, on metformin  Lab Results   Component Value Date/Time    Hemoglobin A1c 5.7 03/02/2017 08:42 AM    Hemoglobin A1c (POC) 5.6 10/26/2017 04:27 PM     Hx of vitamin D deficiency, not on supplements. Review of Systems   Constitutional: Negative for fever, malaise/fatigue and weight loss. Respiratory: Negative for cough, hemoptysis, shortness of breath and wheezing. Cardiovascular: Negative for chest pain, palpitations, leg swelling and PND. Gastrointestinal: Negative for abdominal pain, constipation, diarrhea, nausea and vomiting. The patient's medications, allergies, past medical history, surgical history, family history and social history were reviewed and updated where appropriate. Prior to Admission medications    Medication Sig Start Date End Date Taking? Authorizing Provider   predniSONE (DELTASONE) 20 mg tablet Take 20 mg by mouth daily. 1/24/18  Yes Historical Provider   levocetirizine (XYZAL) 5 mg tablet Take 5 mg by mouth daily. 1/24/18  Yes Historical Provider   metFORMIN ER (GLUCOPHAGE XR) 500 mg tablet TAKE 1 TAB BY MOUTH DAILY (WITH DINNER).  1/16/18  Yes Joshua Kirkland MD   valsartan-hydroCHLOROthiazide (DIOVAN-HCT) 320-12.5 mg per tablet TAKE 1 TABLET BY MOUTH EVERY DAY 11/21/17  Yes Jay MD Roland   simvastatin (ZOCOR) 40 mg tablet TAKE 1 TABLET BY MOUTH NIGHTLY. 11/21/17  Yes Ciro De Leon MD   GLUCOSAMINE-CONDROITIN-HRB#182 PO Take 3,000 mg by mouth daily. Yes Historical Provider   ibuprofen (MOTRIN IB) 200 mg tablet Take 600 mg by mouth every six (6) hours as needed. Indications: PAIN   Yes Historical Provider   aspirin 81 mg chewable tablet Take 81 mg by mouth daily. Indications: MYOCARDIAL INFARCTION PREVENTION   Yes Historical Provider   DOCOSAHEXANOIC ACID/EPA (FISH OIL PO) Take  by mouth daily. Yes Historical Provider       No Known Allergies        OBJECTIVE    Visit Vitals    /82 (BP 1 Location: Right arm, BP Patient Position: Sitting)    Pulse 61    Temp 97.9 °F (36.6 °C) (Oral)    Resp 18    Ht 4' 11.5\" (1.511 m)    Wt 180 lb 9.6 oz (81.9 kg)    LMP 07/30/2013    SpO2 98%    BMI 35.87 kg/m2       Physical Exam   Constitutional: She is well-developed, well-nourished, and in no distress. No distress. HENT:   Head: Normocephalic and atraumatic. Right Ear: External ear normal.   Left Ear: External ear normal.   Eyes: Conjunctivae and EOM are normal. Pupils are equal, round, and reactive to light. Cardiovascular: Normal rate, regular rhythm and normal heart sounds. Exam reveals no gallop and no friction rub. No murmur heard. Pulmonary/Chest: Effort normal and breath sounds normal. No respiratory distress. She has no wheezes. Skin: She is not diaphoretic. Psychiatric: Mood, memory, affect and judgment normal.   Nursing note and vitals reviewed. ASSESSMENT AND PLAN  Avelino Garza is a 58 y.o. female who presents today for:    1. Routine general medical examination at health care facility  Reviewed age appropriate screening tests as recommended by the [place]PST[place] Preventive Services Database with patient today. I have reviewed/discussed the above normal BMI with the patient.   I have recommended the following interventions: dietary management education, guidance, and counseling, encourage exercise, monitor weight and prescribed dietary intake. 2. Glucose intolerance (impaired glucose tolerance)  Stable, continue current treatment. On metformin.  - HEMOGLOBIN A1C WITH EAG    3. Hypercholesterolemia  Will calculate ASCVD risk score pending labs. - LIPID PANEL  - METABOLIC PANEL, COMPREHENSIVE    4. HTN, goal below 150/90  Stable, continue current treatment. 5. Vitamin D deficiency  Stable, continue current treatment pending review of labs. - VITAMIN D, 25 HYDROXY       There are no discontinued medications. Follow-up Disposition:  Return in about 3 months (around 4/26/2018) for pap smear, HTN follow up. Medication risks/benefits/costs/interactions/alternatives discussed with patient. Advised patient to call back or return to office if symptoms worsen/change/persist. If patient cannot reach us or should anything more severe/urgent arise he/she should proceed directly to the nearest emergency department. Discussed expected course/resolution/complications of diagnosis in detail with patient. Patient given a written after visit summary which includes his/her diagnoses, current medications and vitals. Patient expressed understanding with the diagnosis and plan.      Eldon Whipple M.D.

## 2018-01-26 NOTE — LETTER
1/27/2018 3:57 PM 
 
Ms. Codey Drake [facility] [id number] Dear Codey Drake: 
 
Please find your most recent results below. Resulted Orders HEMOGLOBIN A1C WITH EAG Result Value Ref Range Hemoglobin A1c 5.7 (H) 4.8 - 5.6 % Comment:  
            Pre-diabetes: 5.7 - 6.4 Diabetes: >6.4 Glycemic control for adults with diabetes: <7.0 Estimated average glucose 117 mg/dL Narrative Performed at:  [address]  [id number] : Nicolasa Terry MD, Phone:  [phone] LIPID PANEL Result Value Ref Range Cholesterol, total 169 100 - 199 mg/dL Triglyceride 78 0 - 149 mg/dL HDL Cholesterol 49 >39 mg/dL VLDL, calculated 16 5 - 40 mg/dL LDL, calculated 104 (H) 0 - 99 mg/dL Narrative Performed at:  [address]  [id number] : Nicolasa Terry MD, Phone:  [phone] METABOLIC PANEL, COMPREHENSIVE Result Value Ref Range Glucose 99 65 - 99 mg/dL BUN 21 8 - 27 mg/dL Creatinine 0.83 0.57 - 1.00 mg/dL GFR est non-AA 76 >59 mL/min/1.73 GFR est AA 87 >59 mL/min/1.73  
 BUN/Creatinine ratio 25 12 - 28 Sodium 141 134 - 144 mmol/L Potassium 3.9 3.5 - 5.2 mmol/L Chloride 100 96 - 106 mmol/L  
 CO2 22 18 - 29 mmol/L Calcium 9.5 8.7 - 10.3 mg/dL Protein, total 7.2 6.0 - 8.5 g/dL Albumin 4.4 3.6 - 4.8 g/dL GLOBULIN, TOTAL 2.8 1.5 - 4.5 g/dL A-G Ratio 1.6 1.2 - 2.2 Bilirubin, total 0.4 0.0 - 1.2 mg/dL Alk. phosphatase 67 39 - 117 IU/L  
 AST (SGOT) 28 0 - 40 IU/L  
 ALT (SGPT) 37 (H) 0 - 32 IU/L Narrative Performed at:  [address]  [id number] : Nicolasa Terry MD, Phone:  [phone] VITAMIN D, 25 HYDROXY Result Value Ref Range VITAMIN D, 25-HYDROXY 20.6 (L) 30.0 - 100.0 ng/mL Comment: Vitamin D deficiency has been defined by the [address] [facility] practice guideline as a 
level of serum 25-OH vitamin D less than 20 ng/mL (1,2). The Endocrine Society went on to further define vitamin D 
insufficiency as a level between 21 and 29 ng/mL (2). 1. IOM ([place] of Medicine). 2010. Dietary reference 
   intakes for calcium and D. [facility]: The 
   [employer]. 2. Keon MF, Sam NC, Tejas HARMON, et al. 
   Evaluation, treatment, and prevention of vitamin D 
   deficiency: an Endocrine Society clinical practice 
   guideline. JCEM. 2011 Jul; 96(7):1911-30. Narrative Performed at:  [address]  [id number] : Luzma Del Cid MD, Phone:  [phone] CVD REPORT Result Value Ref Range INTERPRETATION Note Comment:  
   Supplemental report is available. Narrative Performed at:  [address] [place] [address] [place] [address]  [id number] : Kortney Alarcon PhD, Phone:  [phone] RECOMMENDATIONS: 
 
Hemoglobin A1C (average blood sugar level for past 3 months) is in the pre diabetes range, which means your average sugar or glucose level is higher than normal. I would encourage healthy diets and regular exercise with the goal of maintaining a healthy weight before starting medications for this. LDL cholesterol is high on simvastatin.  Recommend switching from simvastatin 40mg to atorvastatin 40 mg daily as atorvastatin is a more effective medication in lowering cholesterol. 1 of the liver markers is mildly elevated, will recheck at next visit. Vitamin D levels are low. Recommend prescription for high dose vitamin D3 at 50,000 units once a week for 8 weeks. After 8 weeks, switch to a lower dose of over-the-counter vitamin D3 2000 units every day. Please call me if you have any questions: [phone] Sincerely, 
 
 
 Everardo Kohury MD

## 2018-01-27 LAB
25(OH)D3+25(OH)D2 SERPL-MCNC: 20.6 NG/ML (ref 30–100)
ALBUMIN SERPL-MCNC: 4.4 G/DL (ref 3.6–4.8)
ALBUMIN/GLOB SERPL: 1.6 {RATIO} (ref 1.2–2.2)
ALP SERPL-CCNC: 67 IU/L (ref 39–117)
ALT SERPL-CCNC: 37 IU/L (ref 0–32)
AST SERPL-CCNC: 28 IU/L (ref 0–40)
BILIRUB SERPL-MCNC: 0.4 MG/DL (ref 0–1.2)
BUN SERPL-MCNC: 21 MG/DL (ref 8–27)
BUN/CREAT SERPL: 25 (ref 12–28)
CALCIUM SERPL-MCNC: 9.5 MG/DL (ref 8.7–10.3)
CHLORIDE SERPL-SCNC: 100 MMOL/L (ref 96–106)
CHOLEST SERPL-MCNC: 169 MG/DL (ref 100–199)
CO2 SERPL-SCNC: 22 MMOL/L (ref 18–29)
CREAT SERPL-MCNC: 0.83 MG/DL (ref 0.57–1)
EST. AVERAGE GLUCOSE BLD GHB EST-MCNC: 117 MG/DL
GFR SERPLBLD CREATININE-BSD FMLA CKD-EPI: 76 ML/MIN/1.73
GFR SERPLBLD CREATININE-BSD FMLA CKD-EPI: 87 ML/MIN/1.73
GLOBULIN SER CALC-MCNC: 2.8 G/DL (ref 1.5–4.5)
GLUCOSE SERPL-MCNC: 99 MG/DL (ref 65–99)
HBA1C MFR BLD: 5.7 % (ref 4.8–5.6)
HDLC SERPL-MCNC: 49 MG/DL
INTERPRETATION, 910389: NORMAL
LDLC SERPL CALC-MCNC: 104 MG/DL (ref 0–99)
POTASSIUM SERPL-SCNC: 3.9 MMOL/L (ref 3.5–5.2)
PROT SERPL-MCNC: 7.2 G/DL (ref 6–8.5)
SODIUM SERPL-SCNC: 141 MMOL/L (ref 134–144)
TRIGL SERPL-MCNC: 78 MG/DL (ref 0–149)
VLDLC SERPL CALC-MCNC: 16 MG/DL (ref 5–40)

## 2018-01-27 RX ORDER — ASPIRIN 325 MG
TABLET, DELAYED RELEASE (ENTERIC COATED) ORAL
Qty: 8 CAP | Refills: 0 | Status: SHIPPED | OUTPATIENT
Start: 2018-01-27 | End: 2018-04-20 | Stop reason: ALTCHOICE

## 2018-01-27 NOTE — PROGRESS NOTES
Please notify patient regarding their test results:    Hemoglobin A1C (average blood sugar level for past 3 months) is in the pre diabetes range, which means your average sugar or glucose level is higher than normal. I would encourage healthy diets and regular exercise with the goal of maintaining a healthy weight before starting medications for this. LDL cholesterol is high on simvastatin. Recommend switching from simvastatin 40 to atorvastatin 40 mg daily as atorvastatin is a more effective medication in lowering cholesterol. Please pend order if pt amenable to medication recommendations. 1 of the liver markers is mildly elevated, will recheck at next visit. Vitamin D levels are low. Recommend prescription for high dose vitamin D3 at 50,000 units once a week for 8 weeks. After 8 weeks, switch to a lower dose of over-the-counter vitamin D3 2000 units every day.

## 2018-01-27 NOTE — PROGRESS NOTES
Call to patient.  verified informed patient of results and recommendations. Explained to patient in detail what each lab means and what her exact numbers are. Patient inquired what are medical reasons why liver markers could be elevated in a person. Advised there are many reasons such as steatosis or excessive alcohol which does not pertain to patient which is why we will recheck at next visit to compare results and see what next steps will be needed to take. Advised patient to not worry about liver markers at this time and she needs to focus on health diet, maintaining healthy weight, and exercise. Patient declined switching from simvastatin to atorvastatin and admitted she does not take her cholesterol medication as prescribed and she has many pills left over. Advised she needs to take her cholesterol medication as prescribed because it is prescribed that way for a reason to protect her heart and lower her numbers.  Patient states she will start taking her cholesterol medication  as prescribed and will come back in 3 month for office visit and labs

## 2018-04-02 RX ORDER — ASPIRIN 325 MG
TABLET, DELAYED RELEASE (ENTERIC COATED) ORAL
Qty: 8 CAP | Refills: 0 | OUTPATIENT
Start: 2018-04-02

## 2018-04-04 NOTE — TELEPHONE ENCOUNTER
Call to patient.  verified. Informed patient of request. She states she did not request it. Advised to take otc vitamin d 2,000units.

## 2018-04-17 RX ORDER — VALSARTAN AND HYDROCHLOROTHIAZIDE 320; 12.5 MG/1; MG/1
TABLET, FILM COATED ORAL
Qty: 90 TAB | Refills: 0 | Status: SHIPPED | OUTPATIENT
Start: 2018-04-17 | End: 2018-07-23 | Stop reason: SDUPTHER

## 2018-04-20 ENCOUNTER — OFFICE VISIT (OUTPATIENT)
Dept: FAMILY MEDICINE CLINIC | Age: 63
End: 2018-04-20

## 2018-04-20 VITALS
OXYGEN SATURATION: 97 % | TEMPERATURE: 98.9 F | HEART RATE: 97 BPM | HEIGHT: 60 IN | RESPIRATION RATE: 18 BRPM | SYSTOLIC BLOOD PRESSURE: 122 MMHG | DIASTOLIC BLOOD PRESSURE: 90 MMHG | WEIGHT: 183.2 LBS | BODY MASS INDEX: 35.97 KG/M2

## 2018-04-20 DIAGNOSIS — R73.02 GLUCOSE INTOLERANCE (IMPAIRED GLUCOSE TOLERANCE): Primary | ICD-10-CM

## 2018-04-20 DIAGNOSIS — E55.9 VITAMIN D DEFICIENCY: ICD-10-CM

## 2018-04-20 DIAGNOSIS — R79.89 ELEVATED LFTS: ICD-10-CM

## 2018-04-20 DIAGNOSIS — Z12.4 CERVICAL CANCER SCREENING: ICD-10-CM

## 2018-04-20 DIAGNOSIS — R23.3 EASY BRUISING: ICD-10-CM

## 2018-04-20 DIAGNOSIS — I10 HTN, GOAL BELOW 150/90: ICD-10-CM

## 2018-04-20 DIAGNOSIS — E78.00 HYPERCHOLESTEROLEMIA: ICD-10-CM

## 2018-04-20 PROBLEM — E66.01 SEVERE OBESITY (BMI 35.0-39.9) WITH COMORBIDITY (HCC): Status: ACTIVE | Noted: 2018-04-20

## 2018-04-20 NOTE — PROGRESS NOTES
Chief Complaint   Patient presents with    Hypertension     follow up    Gyn Exam     pap    Cholesterol Problem     follow up     1. Have you been to the ER, urgent care clinic since your last visit? Hospitalized since your last visit? No    2. Have you seen or consulted any other health care providers outside of the [address] [place] [address] since your last visit? Include any pap smears or colon screening.  No

## 2018-04-20 NOTE — PATIENT INSTRUCTIONS

## 2018-04-20 NOTE — LETTER
5/3/2018 6:57 PM 
 
Ms. Kishor Yoder [phone] Dear Kishor Gaines: 
 
Please find your most recent results below. Resulted Orders HEMOGLOBIN A1C WITH EAG Result Value Ref Range Hemoglobin A1c 6.0 (H) 4.8 - 5.6 % Comment:  
            Pre-diabetes: 5.7 - 6.4 Diabetes: >6.4 Glycemic control for adults with diabetes: <7.0 Estimated average glucose 126 mg/dL Narrative Performed at:  [address]  [id number] : Kiara Amaro MD, Phone:  [phone] METABOLIC PANEL, COMPREHENSIVE Result Value Ref Range Glucose 108 (H) 65 - 99 mg/dL BUN 21 8 - 27 mg/dL Creatinine 0.86 0.57 - 1.00 mg/dL GFR est non-AA 73 >59 mL/min/1.73 GFR est AA 84 >59 mL/min/1.73  
 BUN/Creatinine ratio 24 12 - 28 Sodium 138 134 - 144 mmol/L Potassium 3.8 3.5 - 5.2 mmol/L Chloride 98 96 - 106 mmol/L  
 CO2 22 18 - 29 mmol/L Calcium 9.4 8.7 - 10.3 mg/dL Protein, total 7.0 6.0 - 8.5 g/dL Albumin 4.5 3.6 - 4.8 g/dL GLOBULIN, TOTAL 2.5 1.5 - 4.5 g/dL A-G Ratio 1.8 1.2 - 2.2 Bilirubin, total 0.8 0.0 - 1.2 mg/dL Alk. phosphatase 64 39 - 117 IU/L  
 AST (SGOT) 39 0 - 40 IU/L  
 ALT (SGPT) 36 (H) 0 - 32 IU/L Narrative Performed at:  [address]  [id number] : Kiara Amaro MD, Phone:  [phone] LIPID PANEL Result Value Ref Range Cholesterol, total 166 100 - 199 mg/dL Triglyceride 149 0 - 149 mg/dL HDL Cholesterol 41 >39 mg/dL VLDL, calculated 30 5 - 40 mg/dL LDL, calculated 95 0 - 99 mg/dL Narrative Performed at:  [address]  [id number] : Kiara Amaro MD, Phone:  [phone] PAP IG, APTIMA HPV AND RFX 16/[birth year] ([id number]) Result Value Ref Range Diagnosis Comment Comment: NEGATIVE FOR INTRAEPITHELIAL LESION AND MALIGNANCY. Specimen adequacy Comment Comment:  
   Satisfactory for evaluation. No endocervical component is identified. Clinician provided ICD10 Comment Comment:  
   Z12.4 Performed by: Comment Comment:  
   Shakeel Mcbride, Cytotechnologist (ASCP) Margarita Snyder Note: Comment Comment: The Pap smear is a screening test designed to aid in the detection of 
premalignant and malignant conditions of the uterine cervix. It is not a 
diagnostic procedure and should not be used as the sole means of detecting 
cervical cancer. Both false-positive and false-negative reports do occur. Test methodology Comment Comment: This liquid based ThinPrep(R) pap test was screened with the 
use of an image guided system. HPV APTIMA Negative Negative Comment: This test detects fourteen high-risk HPV types (16/18/31/33/35/39/45/ 
51/52/56/58/59/66/68) without differentiation. Narrative Specimen Comment: [id number] Specimen Comment: No. of containers. Margarita Snyder 01 ThinPrep Vial 
Performed at:  [address]  [id number] : Kalpesh Tran MD, Phone:  [phone] Performed at:  [address] [facility] [address] 
[address]  [id number] : Kalpesh Tran MD, Phone:  [phone] VITAMIN D, 25 HYDROXY Result Value Ref Range VITAMIN D, 25-HYDROXY 48.9 30.0 - 100.0 ng/mL Comment:  
   Vitamin D deficiency has been defined by the [facility] practice guideline as a 
level of serum 25-OH vitamin D less than 20 ng/mL (1,2). The Endocrine Society went on to further define vitamin D 
insufficiency as a level between 21 and 29 ng/mL (2). 1. IOM ([place] of Medicine). 2010. Dietary reference 
   intakes for calcium and D. [address] [facility]: The 
   [employer]. 2. Keon MF, Sam NC, Tejas HARMON, et al. 
   Evaluation, treatment, and prevention of vitamin D 
   deficiency: an Endocrine Society clinical practice 
   guideline. JCEM. 2011 Jul; 96(7):1911-30. Narrative Performed at:  [address]  [id number] : Lucien Benites MD, Phone:  [phone] CBC WITH AUTOMATED DIFF Result Value Ref Range WBC 5.2 3.4 - 10.8 x10E3/uL  
 RBC 4.30 3.77 - 5.28 x10E6/uL HGB 12.7 11.1 - 15.9 g/dL HCT 38.1 34.0 - 46.6 % MCV 89 79 - 97 fL  
 MCH 29.5 26.6 - 33.0 pg  
 MCHC 33.3 31.5 - 35.7 g/dL  
 RDW 13.9 12.3 - 15.4 % PLATELET 895 528 - 134 x10E3/uL NEUTROPHILS 43 Not Estab. % Lymphocytes 44 Not Estab. % MONOCYTES 8 Not Estab. % EOSINOPHILS 4 Not Estab. % BASOPHILS 1 Not Estab. %  
 ABS. NEUTROPHILS 2.2 1.4 - 7.0 x10E3/uL Abs Lymphocytes 2.3 0.7 - 3.1 x10E3/uL  
 ABS. MONOCYTES 0.4 0.1 - 0.9 x10E3/uL  
 ABS. EOSINOPHILS 0.2 0.0 - 0.4 x10E3/uL  
 ABS. BASOPHILS 0.0 0.0 - 0.2 x10E3/uL IMMATURE GRANULOCYTES 0 Not Estab. %  
 ABS. IMM. GRANS. 0.0 0.0 - 0.1 x10E3/uL Narrative Performed at:  [address]  [id number] : Lucien Benites MD, Phone:  [phone] CVD REPORT Result Value Ref Range INTERPRETATION Note Comment:  
   Supplemental report is available. Narrative Performed at:  [address] [place] [address]  [id number] : Corina Vivas MD, Phone:  [phone] RECOMMENDATIONS: 
A1c level is mildly elevated since last time but still does not yet meet diabetic criteria.  Since you does not qualify for diabetes, could consider discontinuing metformin now and monitor at next visit. Very mildly elevated liver marker but stable since last check.  Recommend avoiding Tylenol or excessive alcohol use. Cholesterol levels are normal, continue current statin Vitamin D levels are normal.  Blood counts are normal.  
 
Please call me if you have any questions: [phone] Sincerely, Gillian Chang MD

## 2018-04-20 NOTE — MR AVS SNAPSHOT
[address] 
[phone] Patient: Jennifer Dior MRN: [id number] QXV:53/39/1812 Visit Information Date & Time Provider Department Dept. Phone Encounter #  
 4/20/2018  2:30 PM Tushar Borges  [facility] [phone] [id number] Follow-up Instructions Return in about 6 months (around 10/20/2018) for HTN follow up. Upcoming Health Maintenance Date Due Bone Densitometry 8/19/2017 PAP AKA CERVICAL CYTOLOGY 5/14/2018 DTaP/Tdap/Td series (2 - Td) 1/14/2019 BREAST CANCER SCRN MAMMOGRAM 9/6/2019 COLONOSCOPY 12/11/2027 Allergies as of 4/20/2018  Review Complete On: 4/20/2018 By: Anali Pham No Known Allergies Current Immunizations  Reviewed on 3/2/2017 Name Date Influenza Vaccine 11/22/2016, 12/18/2012 Influenza Vaccine (Quad) PF 10/26/2017 TDAP Vaccine 1/14/2009 Zoster Vaccine, Live 1/5/2017 Not reviewed this visit You Were Diagnosed With   
  
 Codes Comments Glucose intolerance (impaired glucose tolerance)    -  Primary ICD-10-CM: R73.02 
ICD-9-CM: 790.22 Hypercholesterolemia     ICD-10-CM: E78.00 ICD-9-CM: 272.0   
 HTN, goal below 150/90     ICD-10-CM: I10 
ICD-9-CM: 401.9 Vitamin D deficiency     ICD-10-CM: E55.9 ICD-9-CM: 268.9 Cervical cancer screening     ICD-10-CM: Z12.4 ICD-9-CM: V76.2 Easy bruising     ICD-10-CM: R23.8 ICD-9-CM: 819. 9 Vitals BP Pulse Temp Resp Height(growth percentile) Weight(growth percentile) 122/90 (BP 1 Location: Left arm, BP Patient Position: Sitting) 97 98.9 °F (37.2 °C) (Oral) 18 4' 11.5\" (1.511 m) 183 lb 3.2 oz (83.1 kg) LMP SpO2 BMI OB Status Smoking Status 07/30/2013 97% 36.38 kg/m2 Postmenopausal Never Smoker Vitals History BMI and BSA Data Body Mass Index Body Surface Area  
 36.38 kg/m 2 1.87 m 2 Preferred Pharmacy Pharmacy Name Phone [id number] [phone] Your Updated Medication List  
  
   
This list is accurate as of 4/20/18  3:19 PM.  Always use your most recent med list.  
  
  
  
  
 aspirin 81 mg chewable tablet Take 81 mg by mouth daily. Indications: MYOCARDIAL INFARCTION PREVENTION  
  
 FISH OIL PO Take  by mouth daily. GLUCOSAMINE-CONDROITIN-HRB#182 PO Take 3,000 mg by mouth daily. levocetirizine 5 mg tablet Commonly known as:  Marisa Hilario Take 5 mg by mouth daily. metFORMIN  mg tablet Commonly known as:  GLUCOPHAGE XR  
TAKE 1 TAB BY MOUTH DAILY (WITH DINNER). MOTRIN  mg tablet Generic drug:  ibuprofen Take 600 mg by mouth every six (6) hours as needed. Indications: PAIN  
  
 simvastatin 40 mg tablet Commonly known as:  ZOCOR  
TAKE 1 TABLET BY MOUTH NIGHTLY.  
  
 valsartan-hydroCHLOROthiazide 320-12.5 mg per tablet Commonly known as:  DIOVAN-HCT  
TAKE 1 TABLET BY MOUTH EVERY DAY We Performed the Following CBC WITH AUTOMATED DIFF [66216 CPT(R)] HEMOGLOBIN A1C WITH EAG [63991 CPT(R)] LIPID PANEL [71487 CPT(R)] METABOLIC PANEL, COMPREHENSIVE [40986 CPT(R)] PAP IG, APTIMA HPV AND RFX 16/18,45 ([id number]) [[id number] Custom] VITAMIN D, 25 HYDROXY [id number] CPT(R)] Follow-up Instructions Return in about 6 months (around 10/20/2018) for HTN follow up. To-Do List   
 09/07/2018 7:30 AM  
  Appointment with [facility] 1 at [address] [place] [address] ([phone]) Please, no calcium supplements or antacids that coat the stomach (ex: Tums, Mylanta) 24 hours prior to procedure. Maintain normal diet and medications. Dairy products are allowed. Wear an outfit with an elastic waistband (no zipper or metal snaps).   Check in at registration 15min before your appointment time unless you were instructed to do otherwise. 09/07/2018 8:00 AM  
  Appointment with [facility] QUANG 1 at [address] ([phone]) Shower or bathe using soap and water. Do not use deodorant, powder, perfumes, or lotion the day of your exam.  If your prior mammograms were not performed at [facility] please bring films with you or forward prior images 2 days before your procedure. Check in at registration 15min before your appointment time unless you were instructed to do otherwise. A script is not necessary, but if you have one, please bring it on the day of the mammogram or have it faxed to the department. [facility] [phone] [facility]  [phone] [facility]  [phone] [facility] [phone] [place] [phone] [facility] [phone] Patient Instructions DASH Diet: Care Instructions Your Care Instructions The DASH diet is an eating plan that can help lower your blood pressure. DASH stands for Dietary Approaches to Stop Hypertension. Hypertension is high blood pressure. The DASH diet focuses on eating foods that are high in calcium, potassium, and magnesium. These nutrients can lower blood pressure. The foods that are highest in these nutrients are fruits, vegetables, low-fat dairy products, nuts, seeds, and legumes. But taking calcium, potassium, and magnesium supplements instead of eating foods that are high in those nutrients does not have the same effect. The DASH diet also includes whole grains, fish, and poultry. The DASH diet is one of several lifestyle changes your doctor may recommend to lower your high blood pressure. Your doctor may also want you to decrease the amount of sodium in your diet. Lowering sodium while following the DASH diet can lower blood pressure even further than just the DASH diet alone. Follow-up care is a key part of your treatment and safety.  Be sure to make and go to all appointments, and call your doctor if you are having problems. It's also a good idea to know your test results and keep a list of the medicines you take. How can you care for yourself at home? Following the DASH diet · Eat 4 to 5 servings of fruit each day. A serving is 1 medium-sized piece of fruit, ½ cup chopped or canned fruit, 1/4 cup dried fruit, or 4 ounces (½ cup) of fruit juice. Choose fruit more often than fruit juice. · Eat 4 to 5 servings of vegetables each day. A serving is 1 cup of lettuce or raw leafy vegetables, ½ cup of chopped or cooked vegetables, or 4 ounces (½ cup) of vegetable juice. Choose vegetables more often than vegetable juice. · Get 2 to 3 servings of low-fat and fat-free dairy each day. A serving is 8 ounces of milk, 1 cup of yogurt, or 1 ½ ounces of cheese. · Eat 6 to 8 servings of grains each day. A serving is 1 slice of bread, 1 ounce of dry cereal, or ½ cup of cooked rice, pasta, or cooked cereal. Try to choose whole-grain products as much as possible. · Limit lean meat, poultry, and fish to 2 servings each day. A serving is 3 ounces, about the size of a deck of cards. · Eat 4 to 5 servings of nuts, seeds, and legumes (cooked dried beans, lentils, and split peas) each week. A serving is 1/3 cup of nuts, 2 tablespoons of seeds, or ½ cup of cooked beans or peas. · Limit fats and oils to 2 to 3 servings each day. A serving is 1 teaspoon of vegetable oil or 2 tablespoons of salad dressing. · Limit sweets and added sugars to 5 servings or less a week. A serving is 1 tablespoon jelly or jam, ½ cup sorbet, or 1 cup of lemonade. · Eat less than 2,300 milligrams (mg) of sodium a day. If you limit your sodium to 1,500 mg a day, you can lower your blood pressure even more. Tips for success · Start small. Do not try to make dramatic changes to your diet all at once.  You might feel that you are missing out on your favorite foods and then be more likely to not follow the plan. Make small changes, and stick with them. Once those changes become habit, add a few more changes. · Try some of the following: ¨ Make it a goal to eat a fruit or vegetable at every meal and at snacks. This will make it easy to get the recommended amount of fruits and vegetables each day. ¨ Try yogurt topped with fruit and nuts for a snack or healthy dessert. ¨ Add lettuce, tomato, cucumber, and onion to sandwiches. ¨ Combine a ready-made pizza crust with low-fat mozzarella cheese and lots of vegetable toppings. Try using tomatoes, squash, spinach, broccoli, carrots, cauliflower, and onions. ¨ Have a variety of cut-up vegetables with a low-fat dip as an appetizer instead of chips and dip. ¨ Sprinkle sunflower seeds or chopped almonds over salads. Or try adding chopped walnuts or almonds to cooked vegetables. ¨ Try some vegetarian meals using beans and peas. Add garbanzo or kidney beans to salads. Make burritos and tacos with mashed pimentel beans or black beans. Where can you learn more? Go to http://brett-nataliya.info/. Enter I880 in the search box to learn more about \"DASH Diet: Care Instructions. \" Current as of: September 21, 2016 Content Version: 11.4 © 7420-9770 [employer]about. Care instructions adapted under license by [employer] (which disclaims liability or warranty for this information). If you have questions about a medical condition or this instruction, always ask your healthcare professional. [place] [address] any warranty or liability for your use of this information. Introducing [facility]! Dear William Baltazar: Thank you for requesting a GELI account. Our records indicate that you already have an active GELI account. You can access your account anytime at [email or web address]. 4-Tell/[employer] Did you know that you can access your hospital and ER discharge instructions at any time in Crocs? You can also review all of your test results from your hospital stay or ER visit. Additional Information If you have questions, please visit the Frequently Asked Questions section of the Crocs website at [email or web address] Remember, Crocs is NOT to be used for urgent needs. For medical emergencies, dial 911. Now available from your iPhone and Android! Please provide this summary of care documentation to your next provider. Your primary care clinician is listed as Jay Watkins. If you have any questions after today's visit, please call [phone].

## 2018-04-20 NOTE — PROGRESS NOTES
Patient Name: Benson Gallo   MRN: [id number]    Zoey Pereira is a 58 y.o. female who presents with the following: The patient has hypertension, hyperlipidemia and pre-dm. She reports taking medications as instructed, no medication side effects noted, no TIA's, no chest pain on exertion, no dyspnea on exertion, no swelling of ankles, no orthostatic dizziness or lightheadedness. Diet and Lifestyle: generally follows a low fat low cholesterol diet, generally follows a low sodium diet, exercises sporadically. Lab review: orders written for new lab studies as appropriate; see orders. Hx of vitamin D deficiency, not on supplements. Her last visit, mildly elevated LFT. Patient denies regular use of alcohol or Tylenol. Has noticed some bruises on her left hand of which she is unable to recall any injury or trauma. Has only noticed that since this morning. Wears a watch on that side. Denies any other easy bruising or bleeding. Takes a baby aspirin every day. Review of Systems   Constitutional: Negative for fever, malaise/fatigue and weight loss. Respiratory: Negative for cough, hemoptysis, shortness of breath and wheezing. Cardiovascular: Negative for chest pain, palpitations, leg swelling and PND. Gastrointestinal: Negative for abdominal pain, constipation, diarrhea, nausea and vomiting. Endo/Heme/Allergies: Bruises/bleeds easily. The patient's medications, allergies, past medical history, surgical history, family history and social history were reviewed and updated where appropriate. Prior to Admission medications    Medication Sig Start Date End Date Taking? Authorizing Provider   valsartan-hydroCHLOROthiazide (DIOVAN-HCT) 320-12.5 mg per tablet TAKE 1 TABLET BY MOUTH EVERY DAY 4/17/18  Yes Shawna Gutiérrez MD   metFORMIN ER (GLUCOPHAGE XR) 500 mg tablet TAKE 1 TAB BY MOUTH DAILY (WITH DINNER).  2/14/18  Yes Shawna Gutiérrez MD   simvastatin (ZOCOR) 40 mg tablet TAKE 1 TABLET BY MOUTH NIGHTLY. 11/21/17  Yes Maury Briscoe MD   GLUCOSAMINE-CONDROITIN-HRB#182 PO Take 3,000 mg by mouth daily. Yes Historical Provider   ibuprofen (MOTRIN IB) 200 mg tablet Take 600 mg by mouth every six (6) hours as needed. Indications: PAIN   Yes Historical Provider   aspirin 81 mg chewable tablet Take 81 mg by mouth daily. Indications: MYOCARDIAL INFARCTION PREVENTION   Yes Historical Provider   DOCOSAHEXANOIC ACID/EPA (FISH OIL PO) Take  by mouth daily. Yes Historical Provider   levocetirizine (XYZAL) 5 mg tablet Take 5 mg by mouth daily. 1/24/18   Historical Provider       No Known Allergies        OBJECTIVE    Visit Vitals    /90 (BP 1 Location: Left arm, BP Patient Position: Sitting)    Pulse 97    Temp 98.9 °F (37.2 °C) (Oral)    Resp 18    Ht 4' 11.5\" (1.511 m)    Wt 183 lb 3.2 oz (83.1 kg)    LMP 07/30/2013    SpO2 97%    BMI 36.38 kg/m2       Physical Exam   Constitutional: She is oriented to person, place, and time and well-developed, well-nourished, and in no distress. No distress. Eyes: Conjunctivae and EOM are normal. Pupils are equal, round, and reactive to light. Musculoskeletal: Normal range of motion. Neurological: She is alert and oriented to person, place, and time. Gait normal.   Skin: Skin is warm and dry. She is not diaphoretic. Psychiatric: Mood, memory, affect and judgment normal.   Nursing note and vitals reviewed. Pelvic exam: VULVA: normal appearing vulva with no masses, tenderness or lesions, VAGINA: normal appearing vagina with normal color and discharge, no lesions, CERVIX: normal appearing cervix without discharge or lesions, pap smear done. ASSESSMENT AND PLAN  Juan Jose Streeter is a 58 y.o. female who presents today for:    1. Glucose intolerance (impaired glucose tolerance)  Stable, continue current treatment pending review of labs. If well controlled, may d/c metformin.  - HEMOGLOBIN A1C WITH EAG    2. Hypercholesterolemia  Will calculate ASCVD risk score pending labs. I have reviewed/discussed the above normal BMI with the patient. I have recommended the following interventions: dietary management education, guidance, and counseling, encourage exercise, monitor weight and prescribed dietary intake. - METABOLIC PANEL, COMPREHENSIVE  - LIPID PANEL    3. HTN, goal below 150/90  Stable, continue current treatment. 4. Vitamin D deficiency  Stable, continue current treatment pending review of labs. - VITAMIN D, 25 HYDROXY    5. Cervical cancer screening  - PAP IG, APTIMA HPV AND RFX 16/18,45 ([id number])    6. Easy bruising  - CBC WITH AUTOMATED DIFF    7. Elevated LFTs  Stable, continue current treatment pending review of labs. Medications Discontinued During This Encounter   Medication Reason    Cholecalciferol, Vitamin D3, 50,000 unit cap Therapy Completed    predniSONE (DELTASONE) 20 mg tablet Therapy Completed       Follow-up Disposition:  Return in about 6 months (around 10/20/2018) for HTN follow up. Medication risks/benefits/costs/interactions/alternatives discussed with patient. Advised patient to call back or return to office if symptoms worsen/change/persist. If patient cannot reach us or should anything more severe/urgent arise he/she should proceed directly to the nearest emergency department. Discussed expected course/resolution/complications of diagnosis in detail with patient. Patient given a written after visit summary which includes his/her diagnoses, current medications and vitals. Patient expressed understanding with the diagnosis and plan.      Jo Ann Cheng M.D.

## 2018-04-23 LAB
CYTOLOGIST CVX/VAG CYTO: NORMAL
CYTOLOGY CVX/VAG DOC THIN PREP: NORMAL
DX ICD CODE: NORMAL
HPV I/H RISK 4 DNA CVX QL PROBE+SIG AMP: NEGATIVE
Lab: NORMAL
OTHER STN SPEC: NORMAL
PATH REPORT.FINAL DX SPEC: NORMAL
STAT OF ADQ CVX/VAG CYTO-IMP: NORMAL

## 2018-04-30 NOTE — PROGRESS NOTES
Please notify patient regarding their test results:    Pap test wnl; would recommend final pap in 5 years. Pt to come by for fasting labs.

## 2018-05-01 NOTE — PROGRESS NOTES
Called patient,  verified. Informed patient of lab results and any recommendations. Patient verbalized understanding.

## 2018-05-03 LAB
25(OH)D3+25(OH)D2 SERPL-MCNC: 48.9 NG/ML (ref 30–100)
ALBUMIN SERPL-MCNC: 4.5 G/DL (ref 3.6–4.8)
ALBUMIN/GLOB SERPL: 1.8 {RATIO} (ref 1.2–2.2)
ALP SERPL-CCNC: 64 IU/L (ref 39–117)
ALT SERPL-CCNC: 36 IU/L (ref 0–32)
AST SERPL-CCNC: 39 IU/L (ref 0–40)
BASOPHILS # BLD AUTO: 0 X10E3/UL (ref 0–0.2)
BASOPHILS NFR BLD AUTO: 1 %
BILIRUB SERPL-MCNC: 0.8 MG/DL (ref 0–1.2)
BUN SERPL-MCNC: 21 MG/DL (ref 8–27)
BUN/CREAT SERPL: 24 (ref 12–28)
CALCIUM SERPL-MCNC: 9.4 MG/DL (ref 8.7–10.3)
CHLORIDE SERPL-SCNC: 98 MMOL/L (ref 96–106)
CHOLEST SERPL-MCNC: 166 MG/DL (ref 100–199)
CO2 SERPL-SCNC: 22 MMOL/L (ref 18–29)
CREAT SERPL-MCNC: 0.86 MG/DL (ref 0.57–1)
EOSINOPHIL # BLD AUTO: 0.2 X10E3/UL (ref 0–0.4)
EOSINOPHIL NFR BLD AUTO: 4 %
ERYTHROCYTE [DISTWIDTH] IN BLOOD BY AUTOMATED COUNT: 13.9 % (ref 12.3–15.4)
EST. AVERAGE GLUCOSE BLD GHB EST-MCNC: 126 MG/DL
GFR SERPLBLD CREATININE-BSD FMLA CKD-EPI: 73 ML/MIN/1.73
GFR SERPLBLD CREATININE-BSD FMLA CKD-EPI: 84 ML/MIN/1.73
GLOBULIN SER CALC-MCNC: 2.5 G/DL (ref 1.5–4.5)
GLUCOSE SERPL-MCNC: 108 MG/DL (ref 65–99)
HBA1C MFR BLD: 6 % (ref 4.8–5.6)
HCT VFR BLD AUTO: 38.1 % (ref 34–46.6)
HDLC SERPL-MCNC: 41 MG/DL
HGB BLD-MCNC: 12.7 G/DL (ref 11.1–15.9)
IMM GRANULOCYTES # BLD: 0 X10E3/UL (ref 0–0.1)
IMM GRANULOCYTES NFR BLD: 0 %
INTERPRETATION, 910389: NORMAL
LDLC SERPL CALC-MCNC: 95 MG/DL (ref 0–99)
LYMPHOCYTES # BLD AUTO: 2.3 X10E3/UL (ref 0.7–3.1)
LYMPHOCYTES NFR BLD AUTO: 44 %
MCH RBC QN AUTO: 29.5 PG (ref 26.6–33)
MCHC RBC AUTO-ENTMCNC: 33.3 G/DL (ref 31.5–35.7)
MCV RBC AUTO: 89 FL (ref 79–97)
MONOCYTES # BLD AUTO: 0.4 X10E3/UL (ref 0.1–0.9)
MONOCYTES NFR BLD AUTO: 8 %
NEUTROPHILS # BLD AUTO: 2.2 X10E3/UL (ref 1.4–7)
NEUTROPHILS NFR BLD AUTO: 43 %
PLATELET # BLD AUTO: 264 X10E3/UL (ref 150–379)
POTASSIUM SERPL-SCNC: 3.8 MMOL/L (ref 3.5–5.2)
PROT SERPL-MCNC: 7 G/DL (ref 6–8.5)
RBC # BLD AUTO: 4.3 X10E6/UL (ref 3.77–5.28)
SODIUM SERPL-SCNC: 138 MMOL/L (ref 134–144)
TRIGL SERPL-MCNC: 149 MG/DL (ref 0–149)
VLDLC SERPL CALC-MCNC: 30 MG/DL (ref 5–40)
WBC # BLD AUTO: 5.2 X10E3/UL (ref 3.4–10.8)

## 2018-05-03 NOTE — PROGRESS NOTES
Please notify patient regarding their test results:    A1c level is mildly elevated since last time but still does not yet meet diabetic criteria. Since she does not qualify for diabetes, could consider discontinuing metformin now and monitor at next visit. Very mildly elevated liver marker but stable since last check. Recommend avoiding Tylenol or excessive alcohol use.   Cholesterol levels are normal, continue current statin  Vitamin D levels are normal.  Blood counts are normal.

## 2018-05-03 NOTE — PROGRESS NOTES
Call to patient.  verified. Informed patient of results and recommendations. Informed her of liver markers. Patient does not take tylenol and alcohol use is only twice/month. Informed patient we will keep a check on this as well as her blood sugars.  patient understands

## 2018-05-21 RX ORDER — SIMVASTATIN 40 MG/1
TABLET, FILM COATED ORAL
Qty: 90 TAB | Refills: 1 | Status: SHIPPED | OUTPATIENT
Start: 2018-05-21 | End: 2018-12-08 | Stop reason: SDUPTHER

## 2018-05-23 ENCOUNTER — OFFICE VISIT (OUTPATIENT)
Dept: FAMILY MEDICINE CLINIC | Age: 63
End: 2018-05-23

## 2018-05-23 VITALS
HEIGHT: 60 IN | RESPIRATION RATE: 18 BRPM | HEART RATE: 70 BPM | OXYGEN SATURATION: 98 % | WEIGHT: 182.8 LBS | BODY MASS INDEX: 35.89 KG/M2 | DIASTOLIC BLOOD PRESSURE: 80 MMHG | SYSTOLIC BLOOD PRESSURE: 126 MMHG | TEMPERATURE: 96.2 F

## 2018-05-23 DIAGNOSIS — S61.215D LACERATION OF LEFT RING FINGER, FOREIGN BODY PRESENCE UNSPECIFIED, NAIL DAMAGE STATUS UNSPECIFIED, SUBSEQUENT ENCOUNTER: ICD-10-CM

## 2018-05-23 DIAGNOSIS — Z48.02 VISIT FOR SUTURE REMOVAL: Primary | ICD-10-CM

## 2018-05-23 NOTE — PROGRESS NOTES
[facility] Note      Subjective:     Chief Complaint   Patient presents with    Suture Removal     PATIENT HERE FOR SUTURE REMOVAL. LEFT HAND RING FINGER SUTURE REMOVAL. Simeon Madrid is a 58 y.o. is here for suture removal.    Cut herself with scissors while trimming flowers on 5/13/18 (mothers day)  Left 4th finger [facility] same day of incident and told to have removed after 10 days. Objective:     Visit Vitals    /80 (BP 1 Location: Left arm, BP Patient Position: Sitting)    Pulse 70    Temp 96.2 °F (35.7 °C) (Oral)    Resp 18    Ht 4' 11.5\" (1.511 m)    Wt 182 lb 12.8 oz (82.9 kg)    SpO2 98%    BMI 36.3 kg/m2       Injury exam: single laceration. Wound is healing well, without evidence of infection. Neurovascular and tendon exam is intact. Patient appears well. Wound is healing well, without evidence of infection. Assessment/Plan:   Diagnoses and all orders for this visit:    1. Visit for suture removal    2.  Laceration of left ring finger, foreign body presence unspecified, nail damage status unspecified, subsequent encounter      Wound healing well, ready for suture removal.  recheck PRN, sutures removed, discussed scaring        Signed,    Sophie San MD

## 2018-05-23 NOTE — PROGRESS NOTES
Chief Complaint   Patient presents with    Suture Removal     PATIENT HERE FOR SUTURE REMOVAL. LEFT HAND RING FINGER SUTURE REMOVAL. 1. Have you been to the ER, urgent care clinic since your last visit? Hospitalized since your last visit? Yes When: LAST SUNDAY, [facility]    2. Have you seen or consulted any other health care providers outside of the [address] [place] [address] since your last visit? Include any pap smears or colon screening.  No

## 2018-05-25 NOTE — PATIENT INSTRUCTIONS
Cuts on the Hand Closed With Stitches: Care Instructions  Your Care Instructions    A cut on your hand can be on your fingers, your thumb, or the front or back of your hand. Sometimes a cut can injure the tendons, blood vessels, or nerves of your hand. The doctor used stitches to close the cut. Using stitches also helps the cut heal and reduces scarring. The doctor may have given you a splint to help prevent you from moving your hand, fingers, or thumb. If the cut went deep and through the skin, the doctor put in two layers of stitches. The deeper layer brings the deep part of the cut together. These stitches will dissolve and don't need to be removed. The stitches in the upper layer are the ones you see on the cut. You will probably have a bandage. You will need to have the stitches removed, usually in 7 to 14 days. The doctor may suggest that you see a hand specialist if the cut is very deep or if you have trouble moving your fingers or have less feeling in your hand. The doctor has checked you carefully, but problems can develop later. If you notice any problems or new symptoms, get medical treatment right away. Follow-up care is a key part of your treatment and safety. Be sure to make and go to all appointments, and call your doctor if you are having problems. It's also a good idea to know your test results and keep a list of the medicines you take. How can you care for yourself at home? · Keep the cut dry for the first 24 to 48 hours. After this, you can shower if your doctor okays it. Pat the cut dry. · Don't soak the cut, such as in a bathtub. Your doctor will tell you when it's safe to get the cut wet. · If your doctor told you how to care for your cut, follow your doctor's instructions. If you did not get instructions, follow this general advice:  ¨ After the first 24 to 48 hours, wash around the cut with clean water 2 times a day.  Don't use hydrogen peroxide or alcohol, which can slow healing. ¨ You may cover the cut with a thin layer of petroleum jelly, such as Vaseline, and a nonstick bandage. ¨ Apply more petroleum jelly and replace the bandage as needed. · Prop up the sore hand on a pillow anytime you sit or lie down during the next 3 days. Try to keep it above the level of your heart. This will help reduce swelling. · Avoid any activity that could cause your cut to reopen. · Do not remove the stitches on your own. Your doctor will tell you when to come back to have the stitches removed. · Be safe with medicines. Take pain medicines exactly as directed. ¨ If the doctor gave you a prescription medicine for pain, take it as prescribed. ¨ If you are not taking a prescription pain medicine, ask your doctor if you can take an over-the-counter medicine. When should you call for help? Call your doctor now or seek immediate medical care if:  ? · You have new pain, or your pain gets worse. ? · The skin near the cut is cold or pale or changes color. ? · You have tingling, weakness, or numbness near the cut.   ? · The cut starts to bleed, and blood soaks through the bandage. Oozing small amounts of blood is normal.   ? · You have trouble moving the area of the hand near the cut.   ? · You have symptoms of infection, such as:  ¨ Increased pain, swelling, warmth, or redness around the cut. ¨ Red streaks leading from the cut. ¨ Pus draining from the cut. ¨ A fever. ? Watch closely for changes in your health, and be sure to contact your doctor if:  ? · You do not get better as expected. Where can you learn more? Go to http://brett-nataliya.info/. Enter T250 in the search box to learn more about \"Cuts on the Hand Closed With Stitches: Care Instructions. \"  Current as of: March 20, 2017  Content Version: 11.4  © 6424-7991 [employer].  Care instructions adapted under license by [employer] (which disclaims liability or warranty for this information). If you have questions about a medical condition or this instruction, always ask your healthcare professional. [place] [address] any warranty or liability for your use of this information.

## 2018-07-24 RX ORDER — VALSARTAN AND HYDROCHLOROTHIAZIDE 320; 12.5 MG/1; MG/1
TABLET, FILM COATED ORAL
Qty: 90 TAB | Refills: 0 | Status: SHIPPED | OUTPATIENT
Start: 2018-07-24 | End: 2018-08-02

## 2018-07-24 NOTE — TELEPHONE ENCOUNTER
Call to patient. ruben verified. Informed patient of the information regarding recall for Valsartan. She states that she researched this herself and her pharmacy does not use any of the companies that dispenses the medications that were recalled. She wants to stay on her current medication.  Valsartan-HCTZ

## 2018-08-02 RX ORDER — OLMESARTAN MEDOXOMIL AND HYDROCHLOROTHIAZIDE 40/12.5 40; 12.5 MG/1; MG/1
1 TABLET ORAL DAILY
Qty: 90 TAB | Refills: 0 | Status: SHIPPED | OUTPATIENT
Start: 2018-08-02 | End: 2018-11-05 | Stop reason: SDUPTHER

## 2018-08-02 RX ORDER — VALSARTAN AND HYDROCHLOROTHIAZIDE 320; 12.5 MG/1; MG/1
TABLET, FILM COATED ORAL
Qty: 90 TAB | Refills: 0 | Status: CANCELLED | OUTPATIENT
Start: 2018-08-02

## 2018-08-02 NOTE — TELEPHONE ENCOUNTER
Patient would like an alternative medication to Valsartan/HCTZ.     Last visit 05/23/2018 for suture removal.    Last labs 05/02/2018

## 2018-08-02 NOTE — TELEPHONE ENCOUNTER
Please call patient. [phone]. She needs an alternative for her Valsartan.     Requested Prescriptions     Pending Prescriptions Disp Refills    valsartan-hydroCHLOROthiazide (DIOVAN-HCT) 320-12.5 mg per tablet 90 Tab 0

## 2018-08-03 NOTE — TELEPHONE ENCOUNTER
Call to pharmacy. They do not have valsartan-hctz on file from 7/24/18. Called in again today.  Left message for patient to call back on Monday but I will send a mychart message

## 2018-08-03 NOTE — TELEPHONE ENCOUNTER
Call to patient.  verified. Informed patient her medication has been switched as requested from valsartan to olmesartan-hydroCHLOROthiazide. Patient became angry and stated she is very upset because she was told by her pharmacy just now that the valsartan prescription she usually gets has not been in the affected manufacturers. Informed patient that I will let Dr. Tyler Barker know this information but the only reason why we changed it was because she (patient) requested it.

## 2018-09-12 ENCOUNTER — HOSPITAL ENCOUNTER (OUTPATIENT)
Dept: MAMMOGRAPHY | Age: 63
Discharge: HOME | End: 2018-09-12
Attending: FAMILY MEDICINE
Payer: MEDICAID

## 2018-09-12 DIAGNOSIS — Z13.820 SCREENING FOR OSTEOPOROSIS: ICD-10-CM

## 2018-09-12 DIAGNOSIS — Z12.31 VISIT FOR SCREENING MAMMOGRAM: ICD-10-CM

## 2018-09-12 DIAGNOSIS — Z78.0 POSTMENOPAUSAL: ICD-10-CM

## 2018-09-12 PROCEDURE — 77067 SCR MAMMO BI INCL CAD: CPT

## 2018-09-12 PROCEDURE — 77080 DXA BONE DENSITY AXIAL: CPT

## 2018-09-13 ENCOUNTER — OFFICE VISIT (OUTPATIENT)
Dept: FAMILY MEDICINE CLINIC | Age: 63
End: 2018-09-13

## 2018-09-13 VITALS
BODY MASS INDEX: 37.7 KG/M2 | RESPIRATION RATE: 18 BRPM | OXYGEN SATURATION: 97 % | DIASTOLIC BLOOD PRESSURE: 88 MMHG | SYSTOLIC BLOOD PRESSURE: 130 MMHG | TEMPERATURE: 98.7 F | HEART RATE: 76 BPM | WEIGHT: 187 LBS | HEIGHT: 59 IN

## 2018-09-13 DIAGNOSIS — R74.8 ELEVATED LIVER ENZYMES: ICD-10-CM

## 2018-09-13 DIAGNOSIS — Z23 ENCOUNTER FOR IMMUNIZATION: ICD-10-CM

## 2018-09-13 DIAGNOSIS — E66.01 SEVERE OBESITY (BMI 35.0-39.9) WITH COMORBIDITY (HCC): ICD-10-CM

## 2018-09-13 DIAGNOSIS — I10 HTN, GOAL BELOW 150/90: ICD-10-CM

## 2018-09-13 DIAGNOSIS — R73.02 GLUCOSE INTOLERANCE (IMPAIRED GLUCOSE TOLERANCE): ICD-10-CM

## 2018-09-13 DIAGNOSIS — H10.9 CONJUNCTIVITIS OF RIGHT EYE, UNSPECIFIED CONJUNCTIVITIS TYPE: Primary | ICD-10-CM

## 2018-09-13 RX ORDER — POLYMYXIN B SULFATE AND TRIMETHOPRIM 1; 10000 MG/ML; [USP'U]/ML
SOLUTION OPHTHALMIC
Qty: 10 ML | Refills: 0 | Status: SHIPPED | OUTPATIENT
Start: 2018-09-13 | End: 2019-05-28 | Stop reason: ALTCHOICE

## 2018-09-13 NOTE — ASSESSMENT & PLAN NOTE
Uncontrolled, based on history, physical exam and review of pertinent labs, studies and medications; meds reconciled; continue current treatment plan, lifestyle modifications recommended. Key Obesity Meds   
    
  
 metFORMIN ER (GLUCOPHAGE XR) 500 mg tablet TAKE 1 TAB BY MOUTH DAILY (WITH DINNER). Lab Results Component Value Date/Time  Hemoglobin A1c 6.0 05/02/2018 08:00 AM  
 Glucose 108 05/02/2018 08:00 AM  
 Cholesterol, total 166 05/02/2018 08:00 AM  
 HDL Cholesterol 41 05/02/2018 08:00 AM  
 LDL, calculated 95 05/02/2018 08:00 AM  
 Triglyceride 149 05/02/2018 08:00 AM  
 Sodium 138 05/02/2018 08:00 AM  
 Potassium 3.8 05/02/2018 08:00 AM  
 ALT (SGPT) 36 05/02/2018 08:00 AM  
 AST (SGOT) 39 05/02/2018 08:00 AM  
 VITAMIN D, 25-HYDROXY 48.9 05/02/2018 08:00 AM

## 2018-09-13 NOTE — PROGRESS NOTES
Chief Complaint Patient presents with  Red Eye  
  right - feels tender - x  1 month  Cold Symptoms  
  congestion 1. Have you been to the ER, urgent care clinic since your last visit? Hospitalized since your last visit? No 
 
2. Have you seen or consulted any other health care providers outside of the [address] [place] [address] since your last visit? Include any pap smears or colon screening. No  
Patient stated that she saw the Dr. Chris Lacey (eye doctor) due to right eye redness about 1 month ago.

## 2018-09-13 NOTE — PROGRESS NOTES
Patient Name: Howie Mayer MRN: [id number] SUBJECTIVE Howie Mayer is a 58 y.o. female who presents with the following:  
 
States that she has had 4 day history of right eye irritation, clear discharge, and pain along her inner eye. She previously had a similar episode several weeks ago in which she saw her optometrist.  Yomaira Dellen her a steroid eyedrop which helped improved her symptoms. Was diagnosed with viral conjunctivitis at the time. She also has had ongoing allergy symptoms including nasal congestion and sneezing due to mold in her office. Currently taking Zyrtec. Denies any fever, cough, chest pain, shortness of breath. The patient has hypertension, hyperlipidemia and pre-dm. She reports taking medications as instructed, no medication side effects noted, no TIA's, no chest pain on exertion, no dyspnea on exertion, no swelling of ankles, no palpitations. Diet and Lifestyle: generally follows a low fat low cholesterol diet, generally follows a low sodium diet, no formal exercise but active during the day. Lab review: no lab studies available for review at time of visit. Review of Systems Constitutional: Negative for fever, malaise/fatigue and weight loss. HENT: Positive for congestion. Negative for sinus pain and sore throat. Eyes: Positive for pain, discharge and redness. Negative for blurred vision, double vision and photophobia. Respiratory: Negative for cough, hemoptysis, shortness of breath and wheezing. Cardiovascular: Negative for chest pain, palpitations, leg swelling and PND. Gastrointestinal: Negative for abdominal pain, constipation, diarrhea, nausea and vomiting. The patient's medications, allergies, past medical history, surgical history, family history and social history were reviewed and updated where appropriate. Prior to Admission medications Medication Sig Start Date End Date Taking? Authorizing Provider Cetirizine (ZYRTEC) 10 mg cap Take 10 mg by mouth daily. Yes Historical Provider  
olmesartan-hydroCHLOROthiazide (BENICAR HCT) 40-12.5 mg per tablet Take 1 Tab by mouth daily. DISCONTINUE VALSARTAN-HCTZ 8/2/18  Yes Herman Curiel MD  
simvastatin (ZOCOR) 40 mg tablet TAKE 1 TABLET BY MOUTH NIGHTLY. 5/21/18  Yes Jay Watkins MD  
GLUCOSAMINE-CONDROITIN-HRB#182 PO Take 3,000 mg by mouth daily. Yes Historical Provider  
ibuprofen (MOTRIN IB) 200 mg tablet Take 600 mg by mouth every six (6) hours as needed. Indications: PAIN   Yes Historical Provider  
aspirin 81 mg chewable tablet Take 81 mg by mouth daily. Indications: MYOCARDIAL INFARCTION PREVENTION   Yes Historical Provider DOCOSAHEXANOIC ACID/EPA (FISH OIL PO) Take  by mouth daily. Yes Historical Provider  
metFORMIN ER (GLUCOPHAGE XR) 500 mg tablet TAKE 1 TAB BY MOUTH DAILY (WITH DINNER). 2/14/18   Herman Curiel MD  
levocetirizine (XYZAL) 5 mg tablet Take 5 mg by mouth daily. 1/24/18   Historical Provider No Known Allergies OBJECTIVE Visit Vitals  /88 (BP 1 Location: Left arm, BP Patient Position: Sitting)  Pulse 76  Temp 98.7 °F (37.1 °C) (Oral)  Resp 18  Ht 4' 11\" (1.499 m)  Wt 187 lb (84.8 kg)  LMP 07/30/2013  SpO2 97%  BMI 37.77 kg/m2 Physical Exam  
Constitutional: She is oriented to person, place, and time and well-developed, well-nourished, and in no distress. No distress. HENT:  
Head: Normocephalic and atraumatic. Right Ear: Tympanic membrane is not perforated and not erythematous. No middle ear effusion. No decreased hearing is noted. Left Ear: Tympanic membrane is not perforated and not erythematous. No middle ear effusion. No decreased hearing is noted. Nose: Nose normal. Right sinus exhibits no maxillary sinus tenderness and no frontal sinus tenderness. Left sinus exhibits no maxillary sinus tenderness and no frontal sinus tenderness. Mouth/Throat: Uvula is midline, oropharynx is clear and moist and mucous membranes are normal.  
Eyes: EOM are normal. Pupils are equal, round, and reactive to light. Right eye exhibits discharge (clear). Right eye exhibits no chemosis, no exudate and no hordeolum. Left eye exhibits no chemosis, no discharge, no exudate and no hordeolum. Right conjunctiva is injected. Right conjunctiva has no hemorrhage. Neck: Normal range of motion. Neck supple. Cardiovascular: Normal rate, regular rhythm and normal heart sounds. Exam reveals no gallop and no friction rub. No murmur heard. Pulmonary/Chest: Effort normal and breath sounds normal. No respiratory distress. She has no wheezes. Lymphadenopathy:  
  She has no cervical adenopathy. Neurological: She is alert and oriented to person, place, and time. Skin: She is not diaphoretic. Psychiatric: Mood, memory, affect and judgment normal.  
Nursing note and vitals reviewed. ASSESSMENT AND PLAN Shara Reyes is a 58 y.o. female who presents today for: 1. Conjunctivitis of right eye, unspecified conjunctivitis type Suspect possible bacterial conjunctivitis; recommend abx eye drops and lacrimal tear massage. Pt to f/u with eye doctor if no improvement. - trimethoprim-polymyxin b (POLYTRIM) ophthalmic solution; 1 to 2 drops four times daily for 5 to 7 days  Dispense: 10 mL; Refill: 0 
 
2. Glucose intolerance (impaired glucose tolerance) 
- HEMOGLOBIN A1C WITH EAG 3. Elevated liver enzymes Stable, continue current treatment pending review of labs. - HEPATIC FUNCTION PANEL 4. HTN, goal below 150/90 Stable, continue current treatment. 5. Severe obesity (BMI 35.0-39. 9) with comorbidity ([address] I have reviewed/discussed the above normal BMI with the patient. I have recommended the following interventions: dietary management education, guidance, and counseling, encourage exercise, monitor weight and prescribed dietary intake. 6. Encounter for immunization - Influenza virus vaccine (QUADRIVALENT PRES FREE SYRINGE) IM (25630) - [place] IMMUNIZ ADMIN,1 SINGLE/COMB VAC/TOXOID 
- varicella-zoster recombinant, PF, (SHINGRIX, PF,) 50 mcg/0.5 mL susr injection; 0.5 mL by IntraMUSCular route once for 1 dose. Please fax over vaccine documentation to [address] [facility], Attn: Dr. Gray Wayne at fax   Dispense: 0.5 mL; Refill: 0 Medications Discontinued During This Encounter Medication Reason  metFORMIN ER (GLUCOPHAGE XR) 500 mg tablet Not A Current Medication Follow-up Disposition: 
Return in about 6 months (around 3/13/2019) for CPE (30 min). Medication risks/benefits/costs/interactions/alternatives discussed with patient. Advised patient to call back or return to office if symptoms worsen/change/persist. If patient cannot reach us or should anything more severe/urgent arise he/she should proceed directly to the nearest emergency department. Discussed expected course/resolution/complications of diagnosis in detail with patient. Patient given a written after visit summary which includes his/her diagnoses, current medications and vitals. Patient expressed understanding with the diagnosis and plan.   
 
Gray Wayne M.D.

## 2018-09-13 NOTE — PATIENT INSTRUCTIONS
Blocked Tear Duct in Children: Care Instructions Your Care Instructions Tears normally drain from the eye through small tubes called tear ducts, which stretch from the eye into the nose. In babies, a blocked tear duct occurs when these tubes get blocked or do not open properly. This can cause your child's eye to be teary and produce a yellowish white substance. If a tear duct remains blocked, the tear duct sac fills with fluid and may become swollen and inflamed. Sometimes it can get infected. In most cases, babies born with a blocked tear duct do not need treatment. The duct tends to open up on its own by 1 year of age. If the duct does not open, a procedure called probing can be used to open it. In the meantime, you can take care of your child at home by keeping the eye clean. This can help prevent infection. If the duct gets infected, your doctor will prescribe antibiotics. Follow-up care is a key part of your child's treatment and safety. Be sure to make and go to all appointments, and call your doctor if your child is having problems. It's also a good idea to know your child's test results and keep a list of the medicines your child takes. How can you care for your child at home? · Keep your child's eye clean: ¨ Moisten a clean cotton ball or washcloth with warm (not hot) water, and gently wipe from the inner (near the nose) to the outer part of the eye. With each wipe, use a new or clean part of the cotton ball or washcloth. ¨ If your child's eyelashes are crusty with mucus, clean them with a moist cotton ball using a gentle, downward motion. If the eyelids get stuck together, place a clean, warm, wet cotton ball over that eye for a few minutes to help loosen the crust. 
· Massage your child's tear duct. Press gently on the inner corner of the eye in a downward motion. Make sure that your hands are clean and your nails are short. · If the doctor prescribed antibiotic pills, eyedrops, or ointment for your child, give them as directed. Do not stop using them just because your child's eye gets better. Your child needs to take the full course of antibiotics. · To put in eyedrops or ointment: ¨ Tilt your child's head back, and pull the lower eyelid down with one finger. ¨ Drop or squirt the medicine inside the lower lid. ¨ Close your child's eye for 30 to 60 seconds to let the drops or ointment move around. ¨ Do not touch the ointment or dropper tip to the eyelashes or any other surface. When should you call for help? Call your doctor now or seek immediate medical care if: 
  · Your child has signs of infection, such as: 
¨ Increased swelling and redness in or around the eye, eyelid, or nose. ¨ Pus draining from the eye. ¨ A fever.  
 Watch closely for changes in your child's health, and be sure to contact your doctor if: 
  · The drainage from your child's eye gets worse.  
  · Your child's tear duct does not open up by the time he or she is 3year old. Where can you learn more? Go to http://brett-nataliya.info/. Enter Q552 in the search box to learn more about \"Blocked Tear Duct in Children: Care Instructions. \" Current as of: May 12, 2017 Content Version: 11.7 © 6452-9409 [employer]. Care instructions adapted under license by [employer] (which disclaims liability or warranty for this information). If you have questions about a medical condition or this instruction, always ask your healthcare professional. [place] [address] any warranty or liability for your use of this information.

## 2018-09-13 NOTE — MR AVS SNAPSHOT
[address] [place] [address] 
[phone] Patient: Imer Olivo MRN: [id number] OAC:60/39/7849 Visit Information Date & Time Provider Department Dept. Phone Encounter #  
 9/13/2018  9:00 AM Claudia Vital MD [facility] [phone] [id number] Follow-up Instructions Return in about 6 months (around 3/13/2019) for CPE (30 min). Upcoming Health Maintenance Date Due Influenza Age 5 to Adult 8/1/2018 Bone Densitometry 9/12/2020 BREAST CANCER SCRN MAMMOGRAM 9/12/2020 PAP AKA CERVICAL CYTOLOGY 4/20/2023 COLONOSCOPY 12/11/2027 DTaP/Tdap/Td series (3 - Td) 5/13/2028 Allergies as of 9/13/2018  Review Complete On: 9/13/2018 By: Dale Trimble No Known Allergies Current Immunizations  Reviewed on 3/2/2017 Name Date Influenza Vaccine 11/22/2016, 12/18/2012 Influenza Vaccine (Quad) PF  Incomplete, 10/26/2017 TDAP Vaccine 1/14/2009 Td 5/13/2018 Zoster Vaccine, Live 1/5/2017 Not reviewed this visit You Were Diagnosed With   
  
 Codes Comments Glucose intolerance (impaired glucose tolerance)    -  Primary ICD-10-CM: R73.02 
ICD-9-CM: 790.22 Elevated liver enzymes     ICD-10-CM: R74.8 ICD-9-CM: 790.5 Severe obesity (BMI 35.0-39. 9) with comorbidity ([facility] [address]     ICD-10-CM: E66.01 
ICD-9-CM: 278.01   
 HTN, goal below 150/90     ICD-10-CM: I10 
ICD-9-CM: 401.9 Encounter for immunization     ICD-10-CM: M87 ICD-9-CM: V03.89 Conjunctivitis of right eye, unspecified conjunctivitis type     ICD-10-CM: H10.9 ICD-9-CM: 372.30 Vitals BP Pulse Temp Resp Height(growth percentile) Weight(growth percentile) 130/88 (BP 1 Location: Left arm, BP Patient Position: Sitting) 76 98.7 °F (37.1 °C) (Oral) 18 4' 11\" (1.499 m) 187 lb (84.8 kg) LMP SpO2 BMI OB Status Smoking Status 07/30/2013 97% 37.77 kg/m2 Postmenopausal Never Smoker Vitals History BMI and BSA Data Body Mass Index Body Surface Area  
 37.77 kg/m 2 1.88 m 2 Preferred Pharmacy Pharmacy Name Phone [id number] [address] [address] [phone] Your Updated Medication List  
  
   
This list is accurate as of 18  9:38 AM.  Always use your most recent med list.  
  
  
  
  
 aspirin 81 mg chewable tablet Take 81 mg by mouth daily. Indications: MYOCARDIAL INFARCTION PREVENTION  
  
 FISH OIL PO Take  by mouth daily. GLUCOSAMINE-CONDROITIN-HRB#182 PO Take 3,000 mg by mouth daily. levocetirizine 5 mg tablet Commonly known as:  Graylon Pino Take 5 mg by mouth daily. MOTRIN  mg tablet Generic drug:  ibuprofen Take 600 mg by mouth every six (6) hours as needed. Indications: PAIN  
  
 olmesartan-hydroCHLOROthiazide 40-12.5 mg per tablet Commonly known as:  BENICAR HCT Take 1 Tab by mouth daily. DISCONTINUE VALSARTAN-HCTZ  
  
 simvastatin 40 mg tablet Commonly known as:  ZOCOR  
TAKE 1 TABLET BY MOUTH NIGHTLY.  
  
 trimethoprim-polymyxin b ophthalmic solution Commonly known as:  POLYTRIM  
1 to 2 drops four times daily for 5 to 7 days  
  
 varicella-zoster recombinant (PF) 50 mcg/0.5 mL Susr injection Commonly known as:  SHINGRIX (PF)  
0.5 mL by IntraMUSCular route once for 1 dose. Please fax over vaccine documentation to [facility], Attn: Dr. Jose Armando West at fax  ZyrTEC 10 mg Cap Generic drug:  Cetirizine Take 10 mg by mouth daily. Prescriptions Printed Refills  
 varicella-zoster recombinant, PF, (SHINGRIX, PF,) 50 mcg/0.5 mL susr injection 0 Si.5 mL by IntraMUSCular route once for 1 dose. Please fax over vaccine documentation to [facility], Attn: Dr. Jose Armando West at fax  Class: Print Route: IntraMUSCular Prescriptions Sent to Pharmacy Refills  
 trimethoprim-polymyxin b (POLYTRIM) ophthalmic solution 0 Si to 2 drops four times daily for 5 to 7 days Class: Normal  
 Pharmacy: [id number] Daf[place] [address], [address] Ph #: [phone] We Performed the Following HEMOGLOBIN A1C WITH EAG [54475 CPT(R)] HEPATIC FUNCTION PANEL [27420 CPT(R)] INFLUENZA VIRUS VAC QUAD,SPLIT,PRESV FREE SYRINGE IM [id number] CPT(R)] [place] IMMUNIZ ADMIN,1 SINGLE/COMB VAC/TOXOID [id number] CPT(R)] Follow-up Instructions Return in about 6 months (around 3/13/2019) for CPE (30 min). Patient Instructions Blocked Tear Duct in Children: Care Instructions Your Care Instructions Tears normally drain from the eye through small tubes called tear ducts, which stretch from the eye into the nose. In babies, a blocked tear duct occurs when these tubes get blocked or do not open properly. This can cause your child's eye to be teary and produce a yellowish white substance. If a tear duct remains blocked, the tear duct sac fills with fluid and may become swollen and inflamed. Sometimes it can get infected. In most cases, babies born with a blocked tear duct do not need treatment. The duct tends to open up on its own by 1 year of age. If the duct does not open, a procedure called probing can be used to open it. In the meantime, you can take care of your child at home by keeping the eye clean. This can help prevent infection. If the duct gets infected, your doctor will prescribe antibiotics. Follow-up care is a key part of your child's treatment and safety. Be sure to make and go to all appointments, and call your doctor if your child is having problems. It's also a good idea to know your child's test results and keep a list of the medicines your child takes. How can you care for your child at home? · Keep your child's eye clean: ¨ Moisten a clean cotton ball or washcloth with warm (not hot) water, and gently wipe from the inner (near the nose) to the outer part of the eye. With each wipe, use a new or clean part of the cotton ball or washcloth. ¨ If your child's eyelashes are crusty with mucus, clean them with a moist cotton ball using a gentle, downward motion. If the eyelids get stuck together, place a clean, warm, wet cotton ball over that eye for a few minutes to help loosen the crust. 
· Massage your child's tear duct. Press gently on the inner corner of the eye in a downward motion. Make sure that your hands are clean and your nails are short. · If the doctor prescribed antibiotic pills, eyedrops, or ointment for your child, give them as directed. Do not stop using them just because your child's eye gets better. Your child needs to take the full course of antibiotics. · To put in eyedrops or ointment: ¨ Tilt your child's head back, and pull the lower eyelid down with one finger. ¨ Drop or squirt the medicine inside the lower lid. ¨ Close your child's eye for 30 to 60 seconds to let the drops or ointment move around. ¨ Do not touch the ointment or dropper tip to the eyelashes or any other surface. When should you call for help? Call your doctor now or seek immediate medical care if: 
  · Your child has signs of infection, such as: 
¨ Increased swelling and redness in or around the eye, eyelid, or nose. ¨ Pus draining from the eye. ¨ A fever.  
 Watch closely for changes in your child's health, and be sure to contact your doctor if: 
  · The drainage from your child's eye gets worse.  
  · Your child's tear duct does not open up by the time he or she is 3year old. Where can you learn more? Go to http://brett-nataliya.info/. Enter G992 in the search box to learn more about \"Blocked Tear Duct in Children: Care Instructions. \" Current as of: May 12, 2017 Content Version: 11.7 © 7554-4445 [employer]wise, Incorporated. Care instructions adapted under license by [employer] (which disclaims liability or warranty for this information). If you have questions about a medical condition or this instruction, always ask your healthcare professional. [address] any warranty or liability for your use of this information. Introducing hospitals & HEALTH SERVICES! [facility]: Thank you for requesting a JEDI MIND account. Our records indicate that you already have an active JEDI MIND account. You can access your account anytime at [email or web address] Did you know that you can access your hospital and ER discharge instructions at any time in JEDI MIND? You can also review all of your test results from your hospital stay or ER visit. Additional Information If you have questions, please visit the Frequently Asked Questions section of the JEDI MIND website at [email or web address] Remember, JEDI MIND is NOT to be used for urgent needs. For medical emergencies, dial 911. Now available from your iPhone and Android! Please provide this summary of care documentation to your next provider. Your primary care clinician is listed as Jay Watkins. If you have any questions after today's visit, please call [phone].

## 2018-09-14 LAB
ALBUMIN SERPL-MCNC: 4.3 G/DL (ref 3.6–4.8)
ALP SERPL-CCNC: 65 IU/L (ref 39–117)
ALT SERPL-CCNC: 39 IU/L (ref 0–32)
AST SERPL-CCNC: 37 IU/L (ref 0–40)
BILIRUB DIRECT SERPL-MCNC: 0.22 MG/DL (ref 0–0.4)
BILIRUB SERPL-MCNC: 1 MG/DL (ref 0–1.2)
EST. AVERAGE GLUCOSE BLD GHB EST-MCNC: 123 MG/DL
HBA1C MFR BLD: 5.9 % (ref 4.8–5.6)
PROT SERPL-MCNC: 7 G/DL (ref 6–8.5)

## 2018-09-14 NOTE — PROGRESS NOTES
Spoke to patient informed her of the following info. Hemoglobin A1C (average blood sugar level for past 3 months) is in the pre diabetes range, which means your average sugar or glucose level is higher than normal.  I would encourage healthy diets and regular exercise with the goal of maintaining a healthy weight before starting medications for this. Liver marker mildly high; possible due to hx of high cholesterol. Patient will follow all recommendations.

## 2018-09-14 NOTE — PROGRESS NOTES
Please notify patient regarding their test results: 
 
Hemoglobin A1C (average blood sugar level for past 3 months) is in the pre diabetes range, which means your average sugar or glucose level is higher than normal.  I would encourage healthy diets and regular exercise with the goal of maintaining a healthy weight before starting medications for this. Liver marker mildly high; possible due to hx of high cholesterol.

## 2018-11-09 ENCOUNTER — TELEPHONE (OUTPATIENT)
Dept: FAMILY MEDICINE CLINIC | Age: 63
End: 2018-11-09

## 2018-11-09 NOTE — TELEPHONE ENCOUNTER
Called patient to inform that Wellness incentive forms are ready to be faxed. Patient requested that we fax forms and she will  a copy for her own records.

## 2019-05-07 DIAGNOSIS — I10 HTN, GOAL BELOW 150/90: Primary | ICD-10-CM

## 2019-05-07 RX ORDER — VALSARTAN AND HYDROCHLOROTHIAZIDE 320; 12.5 MG/1; MG/1
TABLET, FILM COATED ORAL
Qty: 90 TAB | Refills: 1 | OUTPATIENT
Start: 2019-05-07

## 2019-05-07 RX ORDER — OLMESARTAN MEDOXOMIL AND HYDROCHLOROTHIAZIDE 40/12.5 40; 12.5 MG/1; MG/1
1 TABLET ORAL DAILY
Qty: 90 TAB | Refills: 0 | Status: SHIPPED | OUTPATIENT
Start: 2019-05-07 | End: 2019-05-21

## 2019-05-07 RX ORDER — OLMESARTAN MEDOXOMIL 40 MG/1
40 TABLET ORAL DAILY
Qty: 90 TAB | Refills: 1 | Status: CANCELLED | OUTPATIENT
Start: 2019-05-07

## 2019-05-07 NOTE — TELEPHONE ENCOUNTER
Outbound call to patient. Advised patient of FDA recall on Valsartan medication. Patient is agreeable to switching to the alternative medication Olmesartan 40 mg  for 90 day supply to [facility] pharmacy.

## 2019-05-07 NOTE — TELEPHONE ENCOUNTER
----- Message from [facility] sent at 5/7/2019 10:41 AM EDT -----  Regarding: Dr Torres/ telephone  The pt returned the call received in regards to her medication, but doesn't have anymore details.       Best contact number is [phone]

## 2019-05-07 NOTE — TELEPHONE ENCOUNTER
Patient is overdue for physical.  Please notify pt re: FDA recall of valsartan; would switch to alternative substitute medication called olmesartan 40 mg (equivalent dose but mg dose will be different); would pt want to Rx to be sent to 30 or 90 day supply to mail or pharmacy?

## 2019-05-17 RX ORDER — OLMESARTAN MEDOXOMIL AND HYDROCHLOROTHIAZIDE 40/12.5 40; 12.5 MG/1; MG/1
1 TABLET ORAL DAILY
Qty: 90 TAB | Refills: 0 | Status: CANCELLED | OUTPATIENT
Start: 2019-05-17

## 2019-05-17 NOTE — TELEPHONE ENCOUNTER
Patient is calling requesting to have more refill on her BP meds. Pt. Stated she does not want Dr. Jacqueline Bloom to change it for a different medication. Pt. Would like to continue taking her old blood pressure medication. Pharm on file verified. Last refill. 05/07/2019  LOV 09/13/2018    Requested Prescriptions     Pending Prescriptions Disp Refills    olmesartan-hydroCHLOROthiazide (BENICAR HCT) 40-12.5 mg per tablet 90 Tab 0     Sig: Take 1 Tab by mouth daily.  Stop DIOVAN-HCT

## 2019-05-20 NOTE — TELEPHONE ENCOUNTER
Please clarify with pt which medication she is wanting to take and for what reason. (see prior TE note from 5/7). I previously switched her from valsartan (due to recall) to olmesartan. Is she having any issues on olmesartan? She needs CPE with me as I have not seen her since 9/2018.

## 2019-05-21 RX ORDER — VALSARTAN AND HYDROCHLOROTHIAZIDE 320; 12.5 MG/1; MG/1
1 TABLET, FILM COATED ORAL DAILY
Qty: 90 TAB | Refills: 1 | Status: SHIPPED | OUTPATIENT
Start: 2019-05-21 | End: 2019-05-28

## 2019-05-21 NOTE — TELEPHONE ENCOUNTER
Outbound call to patient. Patient states that she still taking her valsartan/hctz. Patient states that she has been on the medication for a long time and it works for her. Patient says she spoke with [facility] and the valsartan that she has is not affected by the recall. Patient request to make an appointment to discuss blood pressure medication. Patient appointment scheduled.

## 2019-05-28 ENCOUNTER — OFFICE VISIT (OUTPATIENT)
Dept: FAMILY MEDICINE CLINIC | Age: 64
End: 2019-05-28

## 2019-05-28 VITALS
OXYGEN SATURATION: 97 % | HEART RATE: 94 BPM | SYSTOLIC BLOOD PRESSURE: 108 MMHG | WEIGHT: 191.2 LBS | TEMPERATURE: 97.5 F | HEIGHT: 59 IN | DIASTOLIC BLOOD PRESSURE: 84 MMHG | RESPIRATION RATE: 16 BRPM | BODY MASS INDEX: 38.55 KG/M2

## 2019-05-28 DIAGNOSIS — E55.9 VITAMIN D DEFICIENCY: ICD-10-CM

## 2019-05-28 DIAGNOSIS — E66.01 SEVERE OBESITY (BMI 35.0-39.9) WITH COMORBIDITY (HCC): ICD-10-CM

## 2019-05-28 DIAGNOSIS — E78.5 HYPERLIPIDEMIA, UNSPECIFIED HYPERLIPIDEMIA TYPE: ICD-10-CM

## 2019-05-28 DIAGNOSIS — I10 HTN, GOAL BELOW 150/90: Primary | ICD-10-CM

## 2019-05-28 DIAGNOSIS — R73.02 GLUCOSE INTOLERANCE (IMPAIRED GLUCOSE TOLERANCE): ICD-10-CM

## 2019-05-28 RX ORDER — VALSARTAN AND HYDROCHLOROTHIAZIDE 160; 12.5 MG/1; MG/1
1 TABLET, FILM COATED ORAL DAILY
Qty: 90 TAB | Refills: 1 | Status: SHIPPED | OUTPATIENT
Start: 2019-05-28 | End: 2019-09-03

## 2019-05-28 NOTE — PROGRESS NOTES
Chief Complaint   Patient presents with    Hypertension     patient would like to talk about medications      1. Have you been to the ER, urgent care clinic since your last visit? Hospitalized since your last visit? No    2. Have you seen or consulted any other health care providers outside of the [address] [place] [address] since your last visit? Include any pap smears or colon screening.  No

## 2019-05-28 NOTE — PATIENT INSTRUCTIONS
DASH Diet: Care Instructions Your Care Instructions The DASH diet is an eating plan that can help lower your blood pressure. DASH stands for Dietary Approaches to Stop Hypertension. Hypertension is high blood pressure. The DASH diet focuses on eating foods that are high in calcium, potassium, and magnesium. These nutrients can lower blood pressure. The foods that are highest in these nutrients are fruits, vegetables, low-fat dairy products, nuts, seeds, and legumes. But taking calcium, potassium, and magnesium supplements instead of eating foods that are high in those nutrients does not have the same effect. The DASH diet also includes whole grains, fish, and poultry. The DASH diet is one of several lifestyle changes your doctor may recommend to lower your high blood pressure. Your doctor may also want you to decrease the amount of sodium in your diet. Lowering sodium while following the DASH diet can lower blood pressure even further than just the DASH diet alone. Follow-up care is a key part of your treatment and safety. Be sure to make and go to all appointments, and call your doctor if you are having problems. It's also a good idea to know your test results and keep a list of the medicines you take. How can you care for yourself at home? Following the DASH diet · Eat 4 to 5 servings of fruit each day. A serving is 1 medium-sized piece of fruit, ½ cup chopped or canned fruit, 1/4 cup dried fruit, or 4 ounces (½ cup) of fruit juice. Choose fruit more often than fruit juice. · Eat 4 to 5 servings of vegetables each day. A serving is 1 cup of lettuce or raw leafy vegetables, ½ cup of chopped or cooked vegetables, or 4 ounces (½ cup) of vegetable juice. Choose vegetables more often than vegetable juice. · Get 2 to 3 servings of low-fat and fat-free dairy each day. A serving is 8 ounces of milk, 1 cup of yogurt, or 1 ½ ounces of cheese. · Eat 6 to 8 servings of grains each day. A serving is 1 slice of bread, 1 ounce of dry cereal, or ½ cup of cooked rice, pasta, or cooked cereal. Try to choose whole-grain products as much as possible. · Limit lean meat, poultry, and fish to 2 servings each day. A serving is 3 ounces, about the size of a deck of cards. · Eat 4 to 5 servings of nuts, seeds, and legumes (cooked dried beans, lentils, and split peas) each week. A serving is 1/3 cup of nuts, 2 tablespoons of seeds, or ½ cup of cooked beans or peas. · Limit fats and oils to 2 to 3 servings each day. A serving is 1 teaspoon of vegetable oil or 2 tablespoons of salad dressing. · Limit sweets and added sugars to 5 servings or less a week. A serving is 1 tablespoon jelly or jam, ½ cup sorbet, or 1 cup of lemonade. · Eat less than 2,300 milligrams (mg) of sodium a day. If you limit your sodium to 1,500 mg a day, you can lower your blood pressure even more. Tips for success · Start small. Do not try to make dramatic changes to your diet all at once. You might feel that you are missing out on your favorite foods and then be more likely to not follow the plan. Make small changes, and stick with them. Once those changes become habit, add a few more changes. · Try some of the following: ? Make it a goal to eat a fruit or vegetable at every meal and at snacks. This will make it easy to get the recommended amount of fruits and vegetables each day. ? Try yogurt topped with fruit and nuts for a snack or healthy dessert. ? Add lettuce, tomato, cucumber, and onion to sandwiches. ? Combine a ready-made pizza crust with low-fat mozzarella cheese and lots of vegetable toppings. Try using tomatoes, squash, spinach, broccoli, carrots, cauliflower, and onions. ? Have a variety of cut-up vegetables with a low-fat dip as an appetizer instead of chips and dip. ? Sprinkle sunflower seeds or chopped almonds over salads.  Or try adding chopped walnuts or almonds to cooked vegetables. ? Try some vegetarian meals using beans and peas. Add garbanzo or kidney beans to salads. Make burritos and tacos with mashed pimentel beans or black beans. Where can you learn more? Go to http://brett-nataliya.info/. Enter K555 in the search box to learn more about \"DASH Diet: Care Instructions. \" Current as of: July 22, 2018 Content Version: 11.9 © 0194-6180 [employer]. Care instructions adapted under license by [employer] (which disclaims liability or warranty for this information). If you have questions about a medical condition or this instruction, always ask your healthcare professional. [address] any warranty or liability for your use of this information. Carpal Tunnel Syndrome: Exercises Your Care Instructions Here are some examples of typical rehabilitation exercises for your condition. Start each exercise slowly. Ease off the exercise if you start to have pain. Your doctor or your physical or occupational therapist will tell you when you can start these exercises and which ones will work best for you. Warm-up stretches When you no longer have pain or numbness, you can do exercises to help prevent carpal tunnel syndrome from coming back. Do not do any stretch or movement that is uncomfortable or painful. 1. Rotate your wrist up, down, and from side to side. Repeat 4 times. 2. Stretch your fingers far apart. Relax them, and then stretch them again. Repeat 4 times. 3. Stretch your thumb by pulling it back gently, holding it, and then releasing it. Repeat 4 times. How to do the exercises Prayer stretch 1. Start with your palms together in front of your chest just below your chin. 2. Slowly lower your hands toward your waistline, keeping your hands close to your stomach and your palms together until you feel a mild to moderate stretch under your forearms. 3. Hold for at least 15 to 30 seconds. Repeat 2 to 4 times. Wrist flexor stretch 1. Extend your arm in front of you with your palm up. 2. Bend your wrist, pointing your hand toward the floor. 3. With your other hand, gently bend your wrist farther until you feel a mild to moderate stretch in your forearm. 4. Hold for at least 15 to 30 seconds. Repeat 2 to 4 times. Wrist extensor stretch 1. Repeat steps 1 through 4 of the stretch above, but begin with your extended hand palm down. Follow-up care is a key part of your treatment and safety. Be sure to make and go to all appointments, and call your doctor if you are having problems. It's also a good idea to know your test results and keep a list of the medicines you take. Where can you learn more? Go to http://brett-nataliya.info/. Enter F461 in the search box to learn more about \"Carpal Tunnel Syndrome: Exercises. \" Current as of: September 20, 2018 Content Version: 11.9 © 7796-0614 [employer]. Care instructions adapted under license by [employer] (which disclaims liability or warranty for this information). If you have questions about a medical condition or this instruction, always ask your healthcare professional. [address] any warranty or liability for your use of this information.

## 2019-05-28 NOTE — ASSESSMENT & PLAN NOTE
Uncontrolled, based on history, physical exam and review of pertinent labs, studies and medications; meds reconciled; continue current treatment plan. Key Obesity Meds     Patient is on no anti-obesity meds.         Lab Results   Component Value Date/Time    Hemoglobin A1c 5.9 09/13/2018 09:43 AM    Glucose 108 05/02/2018 08:00 AM    Cholesterol, total 166 05/02/2018 08:00 AM    HDL Cholesterol 41 05/02/2018 08:00 AM    LDL, calculated 95 05/02/2018 08:00 AM    Triglyceride 149 05/02/2018 08:00 AM    Sodium 138 05/02/2018 08:00 AM    Potassium 3.8 05/02/2018 08:00 AM    ALT (SGPT) 39 09/13/2018 09:43 AM    AST (SGOT) 37 09/13/2018 09:43 AM    VITAMIN D, 25-HYDROXY 48.9 05/02/2018 08:00 AM

## 2019-05-28 NOTE — PROGRESS NOTES
Patient Name: Jeanine Mckeon   MRN: [id number]    Nupur Mccurdy is a 61 y.o. female who presents with the following: The patient has hypertension, hyperlipidemia and pre-dm. She reports does note some dizziness when arising. Diet and Lifestyle: generally follows a low fat low cholesterol diet, generally follows a low sodium diet, exercises sporadically, no formal exercise but active during the day. Lab review: orders written for new lab studies as appropriate; see orders. Hx of vitamin D deficiency, not on supplements. BP Readings from Last 3 Encounters:   05/28/19 108/84   09/13/18 130/88   05/23/18 126/80     Wt Readings from Last 3 Encounters:   05/28/19 191 lb 3.2 oz (86.7 kg)   09/13/18 187 lb (84.8 kg)   05/23/18 182 lb 12.8 oz (82.9 kg)     Review of Systems   Constitutional: Negative for fever, malaise/fatigue and weight loss. Respiratory: Negative for cough, hemoptysis, shortness of breath and wheezing. Cardiovascular: Negative for chest pain, palpitations, leg swelling and PND. Gastrointestinal: Negative for abdominal pain, constipation, diarrhea, nausea and vomiting. Neurological: Positive for dizziness. The patient's medications, allergies, past medical history, surgical history, family history and social history were reviewed and updated where appropriate. Prior to Admission medications    Medication Sig Start Date End Date Taking? Authorizing Provider   valsartan-hydroCHLOROthiazide (DIOVAN-HCT) 320-12.5 mg per tablet Take 1 Tab by mouth daily. 5/21/19  Yes Olga Torres MD   simvastatin (ZOCOR) 40 mg tablet TAKE 1 TABLET BY MOUTH NIGHTLY. 12/10/18  Yes Freda Pringle MD   Cetirizine (ZYRTEC) 10 mg cap Take 10 mg by mouth daily. Yes Provider, Historical   GLUCOSAMINE-CONDROITIN-HRB#182 PO Take 3,000 mg by mouth daily. Yes Provider, Historical   ibuprofen (MOTRIN IB) 200 mg tablet Take 600 mg by mouth every six (6) hours as needed. Indications: PAIN   Yes Provider, Historical   aspirin 81 mg chewable tablet Take 81 mg by mouth daily. Indications: MYOCARDIAL INFARCTION PREVENTION   Yes Provider, Historical   DOCOSAHEXANOIC ACID/EPA (FISH OIL PO) Take  by mouth daily. Yes Provider, Historical   levocetirizine (XYZAL) 5 mg tablet Take 5 mg by mouth daily. 1/24/18   Provider, Historical       No Known Allergies        OBJECTIVE    Visit Vitals  /84 (BP 1 Location: Left arm, BP Patient Position: Sitting)   Pulse 94   Temp 97.5 °F (36.4 °C) (Oral)   Resp 16   Ht 4' 11\" (1.499 m)   Wt 191 lb 3.2 oz (86.7 kg)   LMP 07/30/2013   SpO2 97%   BMI 38.62 kg/m²       Physical Exam   Constitutional: She is oriented to person, place, and time and well-developed, well-nourished, and in no distress. No distress. Eyes: Pupils are equal, round, and reactive to light. Conjunctivae and EOM are normal.   Cardiovascular: Normal rate, regular rhythm and normal heart sounds. Exam reveals no gallop and no friction rub. No murmur heard. Pulmonary/Chest: Effort normal and breath sounds normal. No respiratory distress. She has no wheezes. Neurological: She is alert and oriented to person, place, and time. Skin: Skin is warm and dry. No rash noted. She is not diaphoretic. Psychiatric: Mood, memory, affect and judgment normal.   Nursing note and vitals reviewed. ASSESSMENT AND PLAN  Rosa Hobson is a 61 y.o. female who presents today for:    1. HTN, goal below 150/90  Will lower valsartan dose to 160 mg; pt aware of FDA recall and will check with pharmacy re: affected lots. She prefers to keeps valsartan.  - valsartan-hydroCHLOROthiazide (DIOVAN-HCT) 160-12.5 mg per tablet; Take 1 Tab by mouth daily. DOSE CHANGE  Dispense: 90 Tab; Refill: 1    2. Glucose intolerance (impaired glucose tolerance)  - HEMOGLOBIN A1C WITH EAG    3. Hyperlipidemia, unspecified hyperlipidemia type  Will calculate ASCVD risk score pending labs.   - METABOLIC PANEL, COMPREHENSIVE  - LIPID PANEL    4. Vitamin D deficiency  - VITAMIN D, 25 HYDROXY    5. Severe obesity (BMI 35.0-39. 9) with comorbidity ([address]  I have reviewed/discussed the above normal BMI with the patient. I have recommended the following interventions: dietary management education, guidance, and counseling, encourage exercise, monitor weight and prescribed dietary intake. Medications Discontinued During This Encounter   Medication Reason    trimethoprim-polymyxin b (POLYTRIM) ophthalmic solution Therapy Completed    valsartan-hydroCHLOROthiazide (DIOVAN-HCT) 320-12.5 mg per tablet Not A Current Medication       Follow-up and Dispositions    · Return in about 3 months (around 8/28/2019) for HTN follow up. Medication risks/benefits/costs/interactions/alternatives discussed with patient. Advised patient to call back or return to office if symptoms worsen/change/persist. If patient cannot reach us or should anything more severe/urgent arise he/she should proceed directly to the nearest emergency department. Discussed expected course/resolution/complications of diagnosis in detail with patient. Patient given a written after visit summary which includes his/her diagnoses, current medications and vitals. Patient expressed understanding with the diagnosis and plan. Jay Vasquez M.D.

## 2019-08-15 LAB
25(OH)D3+25(OH)D2 SERPL-MCNC: 22.7 NG/ML (ref 30–100)
ALBUMIN SERPL-MCNC: 4.1 G/DL (ref 3.6–4.8)
ALBUMIN/GLOB SERPL: 1.4 {RATIO} (ref 1.2–2.2)
ALP SERPL-CCNC: 70 IU/L (ref 39–117)
ALT SERPL-CCNC: 52 IU/L (ref 0–32)
AST SERPL-CCNC: 47 IU/L (ref 0–40)
BILIRUB SERPL-MCNC: 0.7 MG/DL (ref 0–1.2)
BUN SERPL-MCNC: 16 MG/DL (ref 8–27)
BUN/CREAT SERPL: 21 (ref 12–28)
CALCIUM SERPL-MCNC: 9.3 MG/DL (ref 8.7–10.3)
CHLORIDE SERPL-SCNC: 102 MMOL/L (ref 96–106)
CHOLEST SERPL-MCNC: 187 MG/DL (ref 100–199)
CO2 SERPL-SCNC: 22 MMOL/L (ref 20–29)
CREAT SERPL-MCNC: 0.77 MG/DL (ref 0.57–1)
EST. AVERAGE GLUCOSE BLD GHB EST-MCNC: 131 MG/DL
GLOBULIN SER CALC-MCNC: 2.9 G/DL (ref 1.5–4.5)
GLUCOSE SERPL-MCNC: 124 MG/DL (ref 65–99)
HBA1C MFR BLD: 6.2 % (ref 4.8–5.6)
HDLC SERPL-MCNC: 33 MG/DL
INTERPRETATION, 910389: NORMAL
LDLC SERPL CALC-MCNC: 105 MG/DL (ref 0–99)
POTASSIUM SERPL-SCNC: 3.8 MMOL/L (ref 3.5–5.2)
PROT SERPL-MCNC: 7 G/DL (ref 6–8.5)
SODIUM SERPL-SCNC: 139 MMOL/L (ref 134–144)
TRIGL SERPL-MCNC: 244 MG/DL (ref 0–149)
VLDLC SERPL CALC-MCNC: 49 MG/DL (ref 5–40)

## 2019-09-03 ENCOUNTER — OFFICE VISIT (OUTPATIENT)
Dept: FAMILY MEDICINE CLINIC | Age: 64
End: 2019-09-03

## 2019-09-03 VITALS
RESPIRATION RATE: 18 BRPM | HEART RATE: 59 BPM | TEMPERATURE: 97.8 F | BODY MASS INDEX: 38.38 KG/M2 | WEIGHT: 190.4 LBS | DIASTOLIC BLOOD PRESSURE: 92 MMHG | HEIGHT: 59 IN | OXYGEN SATURATION: 98 % | SYSTOLIC BLOOD PRESSURE: 142 MMHG

## 2019-09-03 DIAGNOSIS — Z23 ENCOUNTER FOR IMMUNIZATION: ICD-10-CM

## 2019-09-03 DIAGNOSIS — E78.5 HYPERLIPIDEMIA, UNSPECIFIED HYPERLIPIDEMIA TYPE: ICD-10-CM

## 2019-09-03 DIAGNOSIS — E55.9 VITAMIN D DEFICIENCY: ICD-10-CM

## 2019-09-03 DIAGNOSIS — R79.89 ELEVATED LFTS: ICD-10-CM

## 2019-09-03 DIAGNOSIS — I10 HTN, GOAL BELOW 150/90: Primary | ICD-10-CM

## 2019-09-03 RX ORDER — VALSARTAN AND HYDROCHLOROTHIAZIDE 320; 12.5 MG/1; MG/1
1 TABLET, FILM COATED ORAL DAILY
Qty: 90 TAB | Refills: 1 | Status: SHIPPED | OUTPATIENT
Start: 2019-09-03 | End: 2020-02-18 | Stop reason: SDUPTHER

## 2019-09-03 RX ORDER — ASPIRIN 325 MG
TABLET, DELAYED RELEASE (ENTERIC COATED) ORAL
Qty: 8 CAP | Refills: 0 | Status: SHIPPED | OUTPATIENT
Start: 2019-09-03 | End: 2019-11-21

## 2019-09-03 RX ORDER — ATORVASTATIN CALCIUM 40 MG/1
40 TABLET, FILM COATED ORAL DAILY
Qty: 90 TAB | Refills: 1 | Status: SHIPPED | OUTPATIENT
Start: 2019-09-03 | End: 2020-02-18 | Stop reason: SDUPTHER

## 2019-09-03 NOTE — PATIENT INSTRUCTIONS
DASH Diet: Care Instructions  Your Care Instructions    The DASH diet is an eating plan that can help lower your blood pressure. DASH stands for Dietary Approaches to Stop Hypertension. Hypertension is high blood pressure. The DASH diet focuses on eating foods that are high in calcium, potassium, and magnesium. These nutrients can lower blood pressure. The foods that are highest in these nutrients are fruits, vegetables, low-fat dairy products, nuts, seeds, and legumes. But taking calcium, potassium, and magnesium supplements instead of eating foods that are high in those nutrients does not have the same effect. The DASH diet also includes whole grains, fish, and poultry. The DASH diet is one of several lifestyle changes your doctor may recommend to lower your high blood pressure. Your doctor may also want you to decrease the amount of sodium in your diet. Lowering sodium while following the DASH diet can lower blood pressure even further than just the DASH diet alone. Follow-up care is a key part of your treatment and safety. Be sure to make and go to all appointments, and call your doctor if you are having problems. It's also a good idea to know your test results and keep a list of the medicines you take. How can you care for yourself at home? Following the DASH diet  · Eat 4 to 5 servings of fruit each day. A serving is 1 medium-sized piece of fruit, ½ cup chopped or canned fruit, 1/4 cup dried fruit, or 4 ounces (½ cup) of fruit juice. Choose fruit more often than fruit juice. · Eat 4 to 5 servings of vegetables each day. A serving is 1 cup of lettuce or raw leafy vegetables, ½ cup of chopped or cooked vegetables, or 4 ounces (½ cup) of vegetable juice. Choose vegetables more often than vegetable juice. · Get 2 to 3 servings of low-fat and fat-free dairy each day. A serving is 8 ounces of milk, 1 cup of yogurt, or 1 ½ ounces of cheese. · Eat 6 to 8 servings of grains each day.  A serving is 1 slice of bread, 1 ounce of dry cereal, or ½ cup of cooked rice, pasta, or cooked cereal. Try to choose whole-grain products as much as possible. · Limit lean meat, poultry, and fish to 2 servings each day. A serving is 3 ounces, about the size of a deck of cards. · Eat 4 to 5 servings of nuts, seeds, and legumes (cooked dried beans, lentils, and split peas) each week. A serving is 1/3 cup of nuts, 2 tablespoons of seeds, or ½ cup of cooked beans or peas. · Limit fats and oils to 2 to 3 servings each day. A serving is 1 teaspoon of vegetable oil or 2 tablespoons of salad dressing. · Limit sweets and added sugars to 5 servings or less a week. A serving is 1 tablespoon jelly or jam, ½ cup sorbet, or 1 cup of lemonade. · Eat less than 2,300 milligrams (mg) of sodium a day. If you limit your sodium to 1,500 mg a day, you can lower your blood pressure even more. Tips for success  · Start small. Do not try to make dramatic changes to your diet all at once. You might feel that you are missing out on your favorite foods and then be more likely to not follow the plan. Make small changes, and stick with them. Once those changes become habit, add a few more changes. · Try some of the following:  ? Make it a goal to eat a fruit or vegetable at every meal and at snacks. This will make it easy to get the recommended amount of fruits and vegetables each day. ? Try yogurt topped with fruit and nuts for a snack or healthy dessert. ? Add lettuce, tomato, cucumber, and onion to sandwiches. ? Combine a ready-made pizza crust with low-fat mozzarella cheese and lots of vegetable toppings. Try using tomatoes, squash, spinach, broccoli, carrots, cauliflower, and onions. ? Have a variety of cut-up vegetables with a low-fat dip as an appetizer instead of chips and dip. ? Sprinkle sunflower seeds or chopped almonds over salads. Or try adding chopped walnuts or almonds to cooked vegetables.   ? Try some vegetarian meals using beans and peas. Add garbanzo or kidney beans to salads. Make burritos and tacos with mashed pimentel beans or black beans. Where can you learn more? Go to http://brett-nataliya.info/. Enter R940 in the search box to learn more about \"DASH Diet: Care Instructions. \"  Current as of: July 22, 2018  Content Version: 12.1  © 0789-1189 [employer]. Care instructions adapted under license by [employer] (which disclaims liability or warranty for this information). If you have questions about a medical condition or this instruction, always ask your healthcare professional. [address] any warranty or liability for your use of this information.

## 2019-09-03 NOTE — PROGRESS NOTES
Patient Name: Debbie Rees   MRN: [id number]    Betty Loya is a 61 y.o. female who presents with the following: The patient has hypertension, hyperlipidemia and pre-dm  She reports taking medications as instructed, no medication side effects noted. Diet and Lifestyle: generally follows a low fat low cholesterol diet, generally follows a low sodium diet, no formal exercise but active during the day. Lab review: labs reviewed and discussed with patient. Decreased valsartan dose since last visit. BPs have been high and she reports HAs. Had alcohol during a trip one week prior to her last set of blood work. Does not use Tylenol. BP Readings from Last 3 Encounters:   09/03/19 (!) 142/92   05/28/19 108/84   09/13/18 130/88     Review of Systems   Constitutional: Negative for fever, malaise/fatigue and weight loss. Respiratory: Negative for cough, hemoptysis, shortness of breath and wheezing. Cardiovascular: Negative for chest pain, palpitations, leg swelling and PND. Gastrointestinal: Negative for abdominal pain, constipation, diarrhea, nausea and vomiting. The patient's medications, allergies, past medical history, surgical history, family history and social history were reviewed and updated where appropriate. Prior to Admission medications    Medication Sig Start Date End Date Taking? Authorizing Provider   valsartan-hydroCHLOROthiazide (DIOVAN-HCT) 160-12.5 mg per tablet Take 1 Tab by mouth daily. DOSE CHANGE 5/28/19  Yes Patrizia Lantigua MD   simvastatin (ZOCOR) 40 mg tablet TAKE 1 TABLET BY MOUTH NIGHTLY. 12/10/18  Yes Patrizia Lantigua MD   Cetirizine (ZYRTEC) 10 mg cap Take 10 mg by mouth daily. Yes Provider, Historical   GLUCOSAMINE-CONDROITIN-HRB#182 PO Take 3,000 mg by mouth daily. Yes Provider, Historical   ibuprofen (MOTRIN IB) 200 mg tablet Take 600 mg by mouth every six (6) hours as needed.     Indications: PAIN   Yes Provider, Historical   aspirin 81 mg chewable tablet Take 81 mg by mouth daily. Indications: MYOCARDIAL INFARCTION PREVENTION   Yes Provider, Historical   DOCOSAHEXANOIC ACID/EPA (FISH OIL PO) Take  by mouth daily. Yes Provider, Historical   levocetirizine (XYZAL) 5 mg tablet Take 5 mg by mouth daily. 1/24/18   Provider, Historical       No Known Allergies        OBJECTIVE    Visit Vitals  BP (!) 142/92 (BP 1 Location: Right arm, BP Patient Position: Sitting)   Pulse (!) 59   Temp 97.8 °F (36.6 °C) (Oral)   Resp 18   Ht 4' 11\" (1.499 m)   Wt 190 lb 6.4 oz (86.4 kg)   LMP 07/30/2013   SpO2 98%   BMI 38.46 kg/m²       Physical Exam   Constitutional: She is oriented to person, place, and time and well-developed, well-nourished, and in no distress. No distress. Eyes: Pupils are equal, round, and reactive to light. Conjunctivae and EOM are normal.   Musculoskeletal: Normal range of motion. Neurological: She is alert and oriented to person, place, and time. Gait normal.   Skin: Skin is warm and dry. She is not diaphoretic. Psychiatric: Mood, memory, affect and judgment normal.   Nursing note and vitals reviewed. ASSESSMENT AND PLAN  Debbie Rees is a 61 y.o. female who presents today for:    1. HTN, goal below 150/90  Resume prior valsartan dose.  - valsartan-hydroCHLOROthiazide (DIOVAN-HCT) 320-12.5 mg per tablet; Take 1 Tab by mouth daily. Dispense: 90 Tab; Refill: 1    2. Hyperlipidemia, unspecified hyperlipidemia type  Switch from simvastatin to atorvastatin. - atorvastatin (LIPITOR) 40 mg tablet; Take 1 Tab by mouth daily. Stop simvastatin. Dispense: 90 Tab; Refill: 1    3. Vitamin D deficiency  - cholecalciferol (VITAMIN D3) 50,000 unit capsule; Take 1 capsule once a week for the next 8 weeks then switch to OTC vitamin D3 2000 units daily  Dispense: 8 Cap; Refill: 0    4. Elevated LFTs  Likely due to alcohol; encourage minimal intake and will repeat labs at next visit.       Medications Discontinued During This Encounter Medication Reason    valsartan-hydroCHLOROthiazide (DIOVAN-HCT) 160-12.5 mg per tablet Not A Current Medication    simvastatin (ZOCOR) 40 mg tablet Not A Current Medication       Follow-up and Dispositions    · Return in about 3 months (around 12/3/2019) for HTN follow up. Medication risks/benefits/costs/interactions/alternatives discussed with patient. Advised patient to call back or return to office if symptoms worsen/change/persist. If patient cannot reach us or should anything more severe/urgent arise he/she should proceed directly to the nearest emergency department. Discussed expected course/resolution/complications of diagnosis in detail with patient. Patient given a written after visit summary which includes his/her diagnoses, current medications and vitals. Patient expressed understanding with the diagnosis and plan. Jay Deluna M.D.

## 2019-09-03 NOTE — PROGRESS NOTES
Chief Complaint   Patient presents with    Hypertension     follow up     1. Have you been to the ER, urgent care clinic since your last visit? Hospitalized since your last visit? No    2. Have you seen or consulted any other health care providers outside of the [facility] since your last visit? Include any pap smears or colon screening.  No

## 2019-09-16 ENCOUNTER — HOSPITAL ENCOUNTER (OUTPATIENT)
Dept: MAMMOGRAPHY | Age: 64
Discharge: HOME | End: 2019-09-16
Attending: FAMILY MEDICINE
Payer: MEDICAID

## 2019-09-16 DIAGNOSIS — Z12.39 BREAST SCREENING: ICD-10-CM

## 2019-09-16 PROCEDURE — 77063 BREAST TOMOSYNTHESIS BI: CPT

## 2019-10-23 DIAGNOSIS — E55.9 VITAMIN D DEFICIENCY: ICD-10-CM

## 2019-10-23 RX ORDER — ASPIRIN 325 MG
TABLET, DELAYED RELEASE (ENTERIC COATED) ORAL
Qty: 4 CAP | Refills: 1 | OUTPATIENT
Start: 2019-10-23

## 2019-10-23 RX ORDER — ACETAMINOPHEN 500 MG
2000 TABLET ORAL DAILY
Qty: 30 CAP | Refills: 0 | Status: SHIPPED | OUTPATIENT
Start: 2019-10-23 | End: 2019-11-20 | Stop reason: SDUPTHER

## 2019-10-23 NOTE — TELEPHONE ENCOUNTER
1. Vitamin D deficiency  Per last rx. The patient's Vit D3 50,000 weekly is to be discontinued after 8 weeks. Then she is to start Vit D 3 2000 units daily, which has been sent to pharmacy. Please call patient to inform of change.

## 2019-11-20 NOTE — TELEPHONE ENCOUNTER
Pharmacy faxed in a refill request for 90 day supply for the following Rx: . Mg [place] Requested Prescriptions     Pending Prescriptions Disp Refills    cholecalciferol (VITAMIN D3) 2,000 unit cap capsule 30 Cap 0     Sig: Take 2,000 Units by mouth daily.      [facility]/pharmacy #[id number]- Anika Geller [address] [facility] [address] LXQU[address]  [phone]

## 2019-11-21 RX ORDER — ACETAMINOPHEN 500 MG
2000 TABLET ORAL DAILY
Qty: 90 CAP | Refills: 1 | Status: SHIPPED | OUTPATIENT
Start: 2019-11-21 | End: 2020-05-11 | Stop reason: SDUPTHER

## 2019-11-25 ENCOUNTER — OFFICE VISIT (OUTPATIENT)
Dept: FAMILY MEDICINE CLINIC | Age: 64
End: 2019-11-25

## 2019-11-25 VITALS
SYSTOLIC BLOOD PRESSURE: 108 MMHG | TEMPERATURE: 97.5 F | BODY MASS INDEX: 34.92 KG/M2 | HEIGHT: 59 IN | HEART RATE: 67 BPM | DIASTOLIC BLOOD PRESSURE: 70 MMHG | RESPIRATION RATE: 18 BRPM | WEIGHT: 173.2 LBS | OXYGEN SATURATION: 97 %

## 2019-11-25 DIAGNOSIS — I10 HTN, GOAL BELOW 150/90: Primary | ICD-10-CM

## 2019-11-25 DIAGNOSIS — E55.9 VITAMIN D DEFICIENCY: ICD-10-CM

## 2019-11-25 DIAGNOSIS — R73.02 GLUCOSE INTOLERANCE (IMPAIRED GLUCOSE TOLERANCE): ICD-10-CM

## 2019-11-25 DIAGNOSIS — R79.89 ELEVATED LFTS: ICD-10-CM

## 2019-11-25 DIAGNOSIS — E78.5 HYPERLIPIDEMIA, UNSPECIFIED HYPERLIPIDEMIA TYPE: ICD-10-CM

## 2019-11-25 NOTE — PROGRESS NOTES
Patient Name: Candy Retana   MRN: [id number]    Sandro Garay is a 59 y.o. female who presents with the following: The patient has hypertension, hyperlipidemia and pre-dm. She reports taking medications as instructed, no medication side effects noted. Diet and Lifestyle: generally follows a low fat low cholesterol diet, generally follows a low sodium diet, no formal exercise but active during the day. Lab review: orders written for new lab studies as appropriate; see orders. Lost 17 lbs intentionally through diet changes. Hx of vitamin D deficiency, on supplements. Switched from simvastatin to atorvastatin since last visit. BP Readings from Last 3 Encounters:   11/25/19 108/70   09/03/19 (!) 142/92   05/28/19 108/84     Review of Systems   Constitutional: Negative for fever, malaise/fatigue and weight loss. Respiratory: Negative for cough, hemoptysis, shortness of breath and wheezing. Cardiovascular: Negative for chest pain, palpitations, leg swelling and PND. Gastrointestinal: Negative for abdominal pain, constipation, diarrhea, nausea and vomiting. The patient's medications, allergies, past medical history, surgical history, family history and social history were reviewed and updated where appropriate. Prior to Admission medications    Medication Sig Start Date End Date Taking? Authorizing Provider   cholecalciferol (VITAMIN D3) 2,000 unit cap capsule Take 2,000 Units by mouth daily. 11/21/19  Yes Soledad Sanchez MD   atorvastatin (LIPITOR) 40 mg tablet Take 1 Tab by mouth daily. Stop simvastatin. 9/3/19  Yes Soledad Sanchez MD   valsartan-hydroCHLOROthiazide (DIOVAN-HCT) 320-12.5 mg per tablet Take 1 Tab by mouth daily. 9/3/19  Yes Soledad Sanchez MD   Cetirizine (ZYRTEC) 10 mg cap Take 10 mg by mouth daily. Yes Provider, Historical   GLUCOSAMINE-CONDROITIN-HRB#182 PO Take 3,000 mg by mouth daily.      Yes Provider, Historical   ibuprofen (MOTRIN IB) 200 mg tablet Take 600 mg by mouth every six (6) hours as needed. Indications: PAIN   Yes Provider, Historical   aspirin 81 mg chewable tablet Take 81 mg by mouth daily. Indications: MYOCARDIAL INFARCTION PREVENTION   Yes Provider, Historical   DOCOSAHEXANOIC ACID/EPA (FISH OIL PO) Take  by mouth daily. Yes Provider, Historical   levocetirizine (XYZAL) 5 mg tablet Take 5 mg by mouth daily. 1/24/18   Provider, Historical       No Known Allergies      OBJECTIVE    Visit Vitals  /70 (BP 1 Location: Left arm, BP Patient Position: Sitting)   Pulse 67   Temp 97.5 °F (36.4 °C) (Oral)   Resp 18   Ht 4' 11\" (1.499 m)   Wt 173 lb 3.2 oz (78.6 kg)   LMP 07/30/2013   SpO2 97%   BMI 34.98 kg/m²       Physical Exam  Vitals signs and nursing note reviewed. Constitutional:       General: She is not in acute distress. Appearance: She is not diaphoretic. Eyes:      Conjunctiva/sclera: Conjunctivae normal.      Pupils: Pupils are equal, round, and reactive to light. Cardiovascular:      Rate and Rhythm: Normal rate and regular rhythm. Heart sounds: Normal heart sounds. No murmur. No friction rub. No gallop. Pulmonary:      Effort: Pulmonary effort is normal. No respiratory distress. Breath sounds: Normal breath sounds. No wheezing. Skin:     General: Skin is warm and dry. Findings: No rash. Neurological:      Mental Status: She is alert and oriented to person, place, and time. Psychiatric:         Mood and Affect: Mood and affect normal.         Cognition and Memory: Memory normal.         Judgment: Judgment normal.           ASSESSMENT AND PLAN  Isis Bryan is a 59 y.o. female who presents today for:    1. HTN, goal below 150/90  Stable, continue current treatment. 2. Hyperlipidemia, unspecified hyperlipidemia type  Will calculate ASCVD risk score pending labs. Will fill out forms pending labs.   - METABOLIC PANEL, COMPREHENSIVE  - LIPID PANEL    3. Vitamin D deficiency  - VITAMIN D, 25 HYDROXY    4. Elevated LFTs  - METABOLIC PANEL, COMPREHENSIVE    5. Glucose intolerance (impaired glucose tolerance)  - HEMOGLOBIN A1C WITH EAG    Medications Discontinued During This Encounter   Medication Reason    levocetirizine (XYZAL) 5 mg tablet        Follow-up and Dispositions    · Return in about 6 months (around 5/25/2020) for HTN follow up. Medication risks/benefits/costs/interactions/alternatives discussed with patient. Advised patient to call back or return to office if symptoms worsen/change/persist. If patient cannot reach us or should anything more severe/urgent arise he/she should proceed directly to the nearest emergency department. Discussed expected course/resolution/complications of diagnosis in detail with patient. Patient given a written after visit summary which includes his/her diagnoses, current medications and vitals. Patient expressed understanding with the diagnosis and plan. Jay Griffith M.D.

## 2019-11-25 NOTE — PROGRESS NOTES
Chief Complaint   Patient presents with    Hypertension     follow up     1. Have you been to the ER, urgent care clinic since your last visit? Hospitalized since your last visit? No    2. Have you seen or consulted any other health care providers outside of the [address] [place] [address] since your last visit? Include any pap smears or colon screening.  No

## 2019-11-25 NOTE — PATIENT INSTRUCTIONS
DASH Diet: Care Instructions Your Care Instructions The DASH diet is an eating plan that can help lower your blood pressure. DASH stands for Dietary Approaches to Stop Hypertension. Hypertension is high blood pressure. The DASH diet focuses on eating foods that are high in calcium, potassium, and magnesium. These nutrients can lower blood pressure. The foods that are highest in these nutrients are fruits, vegetables, low-fat dairy products, nuts, seeds, and legumes. But taking calcium, potassium, and magnesium supplements instead of eating foods that are high in those nutrients does not have the same effect. The DASH diet also includes whole grains, fish, and poultry. The DASH diet is one of several lifestyle changes your doctor may recommend to lower your high blood pressure. Your doctor may also want you to decrease the amount of sodium in your diet. Lowering sodium while following the DASH diet can lower blood pressure even further than just the DASH diet alone. Follow-up care is a key part of your treatment and safety. Be sure to make and go to all appointments, and call your doctor if you are having problems. It's also a good idea to know your test results and keep a list of the medicines you take. How can you care for yourself at home? Following the DASH diet · Eat 4 to 5 servings of fruit each day. A serving is 1 medium-sized piece of fruit, ½ cup chopped or canned fruit, 1/4 cup dried fruit, or 4 ounces (½ cup) of fruit juice. Choose fruit more often than fruit juice. · Eat 4 to 5 servings of vegetables each day. A serving is 1 cup of lettuce or raw leafy vegetables, ½ cup of chopped or cooked vegetables, or 4 ounces (½ cup) of vegetable juice. Choose vegetables more often than vegetable juice. · Get 2 to 3 servings of low-fat and fat-free dairy each day. A serving is 8 ounces of milk, 1 cup of yogurt, or 1 ½ ounces of cheese. · Eat 6 to 8 servings of grains each day. A serving is 1 slice of bread, 1 ounce of dry cereal, or ½ cup of cooked rice, pasta, or cooked cereal. Try to choose whole-grain products as much as possible. · Limit lean meat, poultry, and fish to 2 servings each day. A serving is 3 ounces, about the size of a deck of cards. · Eat 4 to 5 servings of nuts, seeds, and legumes (cooked dried beans, lentils, and split peas) each week. A serving is 1/3 cup of nuts, 2 tablespoons of seeds, or ½ cup of cooked beans or peas. · Limit fats and oils to 2 to 3 servings each day. A serving is 1 teaspoon of vegetable oil or 2 tablespoons of salad dressing. · Limit sweets and added sugars to 5 servings or less a week. A serving is 1 tablespoon jelly or jam, ½ cup sorbet, or 1 cup of lemonade. · Eat less than 2,300 milligrams (mg) of sodium a day. If you limit your sodium to 1,500 mg a day, you can lower your blood pressure even more. Tips for success · Start small. Do not try to make dramatic changes to your diet all at once. You might feel that you are missing out on your favorite foods and then be more likely to not follow the plan. Make small changes, and stick with them. Once those changes become habit, add a few more changes. · Try some of the following: ? Make it a goal to eat a fruit or vegetable at every meal and at snacks. This will make it easy to get the recommended amount of fruits and vegetables each day. ? Try yogurt topped with fruit and nuts for a snack or healthy dessert. ? Add lettuce, tomato, cucumber, and onion to sandwiches. ? Combine a ready-made pizza crust with low-fat mozzarella cheese and lots of vegetable toppings. Try using tomatoes, squash, spinach, broccoli, carrots, cauliflower, and onions. ? Have a variety of cut-up vegetables with a low-fat dip as an appetizer instead of chips and dip. ? Sprinkle sunflower seeds or chopped almonds over salads.  Or try adding chopped walnuts or almonds to cooked vegetables. ? Try some vegetarian meals using beans and peas. Add garbanzo or kidney beans to salads. Make burritos and tacos with mashed pimentel beans or black beans. Where can you learn more? Go to http://brett-nataliya.info/. Enter K093 in the search box to learn more about \"DASH Diet: Care Instructions. \" Current as of: April 9, 2019 Content Version: 12.2 © 6038-3384 [employer]. Care instructions adapted under license by [employer] (which disclaims liability or warranty for this information). If you have questions about a medical condition or this instruction, always ask your healthcare professional. [address] any warranty or liability for your use of this information.

## 2019-11-26 LAB
25(OH)D3+25(OH)D2 SERPL-MCNC: 62.7 NG/ML (ref 30–100)
ALBUMIN SERPL-MCNC: 4.7 G/DL (ref 3.6–4.8)
ALBUMIN/GLOB SERPL: 1.9 {RATIO} (ref 1.2–2.2)
ALP SERPL-CCNC: 67 IU/L (ref 39–117)
ALT SERPL-CCNC: 25 IU/L (ref 0–32)
AST SERPL-CCNC: 27 IU/L (ref 0–40)
BILIRUB SERPL-MCNC: 0.9 MG/DL (ref 0–1.2)
BUN SERPL-MCNC: 26 MG/DL (ref 8–27)
BUN/CREAT SERPL: 29 (ref 12–28)
CALCIUM SERPL-MCNC: 9.7 MG/DL (ref 8.7–10.3)
CHLORIDE SERPL-SCNC: 105 MMOL/L (ref 96–106)
CHOLEST SERPL-MCNC: 131 MG/DL (ref 100–199)
CO2 SERPL-SCNC: 20 MMOL/L (ref 20–29)
CREAT SERPL-MCNC: 0.89 MG/DL (ref 0.57–1)
EST. AVERAGE GLUCOSE BLD GHB EST-MCNC: 120 MG/DL
GLOBULIN SER CALC-MCNC: 2.5 G/DL (ref 1.5–4.5)
GLUCOSE SERPL-MCNC: 120 MG/DL (ref 65–99)
HBA1C MFR BLD: 5.8 % (ref 4.8–5.6)
HDLC SERPL-MCNC: 38 MG/DL
INTERPRETATION, 910389: NORMAL
LDLC SERPL CALC-MCNC: 75 MG/DL (ref 0–99)
POTASSIUM SERPL-SCNC: 3.8 MMOL/L (ref 3.5–5.2)
PROT SERPL-MCNC: 7.2 G/DL (ref 6–8.5)
SODIUM SERPL-SCNC: 141 MMOL/L (ref 134–144)
TRIGL SERPL-MCNC: 91 MG/DL (ref 0–149)
VLDLC SERPL CALC-MCNC: 18 MG/DL (ref 5–40)

## 2020-02-18 DIAGNOSIS — E78.5 HYPERLIPIDEMIA, UNSPECIFIED HYPERLIPIDEMIA TYPE: ICD-10-CM

## 2020-02-18 DIAGNOSIS — I10 HTN, GOAL BELOW 150/90: ICD-10-CM

## 2020-02-18 RX ORDER — VALSARTAN AND HYDROCHLOROTHIAZIDE 320; 12.5 MG/1; MG/1
1 TABLET, FILM COATED ORAL DAILY
Qty: 90 TAB | Refills: 2 | Status: SHIPPED | OUTPATIENT
Start: 2020-02-18 | End: 2020-11-23

## 2020-02-18 RX ORDER — ATORVASTATIN CALCIUM 40 MG/1
40 TABLET, FILM COATED ORAL DAILY
Qty: 90 TAB | Refills: 2 | Status: SHIPPED | OUTPATIENT
Start: 2020-02-18 | End: 2020-11-23

## 2020-05-13 RX ORDER — ACETAMINOPHEN 500 MG
2000 TABLET ORAL DAILY
Qty: 90 CAP | Refills: 1 | Status: SHIPPED | OUTPATIENT
Start: 2020-05-13

## 2020-10-06 ENCOUNTER — HOSPITAL ENCOUNTER (OUTPATIENT)
Dept: MAMMOGRAPHY | Age: 65
Discharge: HOME | End: 2020-10-06
Attending: FAMILY MEDICINE
Payer: MEDICARE

## 2020-10-06 DIAGNOSIS — Z12.31 VISIT FOR SCREENING MAMMOGRAM: ICD-10-CM

## 2020-10-06 PROCEDURE — 77063 BREAST TOMOSYNTHESIS BI: CPT

## 2020-12-15 ENCOUNTER — VIRTUAL VISIT (OUTPATIENT)
Dept: FAMILY MEDICINE CLINIC | Age: 65
End: 2020-12-15
Payer: MEDICARE

## 2020-12-15 DIAGNOSIS — R73.02 GLUCOSE INTOLERANCE (IMPAIRED GLUCOSE TOLERANCE): ICD-10-CM

## 2020-12-15 DIAGNOSIS — Z00.00 ENCOUNTER FOR MEDICARE ANNUAL WELLNESS EXAM: Primary | ICD-10-CM

## 2020-12-15 DIAGNOSIS — I10 HTN, GOAL BELOW 150/90: ICD-10-CM

## 2020-12-15 DIAGNOSIS — E55.9 VITAMIN D DEFICIENCY: ICD-10-CM

## 2020-12-15 DIAGNOSIS — E78.5 HYPERLIPIDEMIA, UNSPECIFIED HYPERLIPIDEMIA TYPE: ICD-10-CM

## 2020-12-15 PROCEDURE — G8428 CUR MEDS NOT DOCUMENT: HCPCS | Performed by: FAMILY MEDICINE

## 2020-12-15 PROCEDURE — G9899 SCRN MAM PERF RSLTS DOC: HCPCS | Performed by: FAMILY MEDICINE

## 2020-12-15 PROCEDURE — G8432 DEP SCR NOT DOC, RNG: HCPCS | Performed by: FAMILY MEDICINE

## 2020-12-15 PROCEDURE — 1090F PRES/ABSN URINE INCON ASSESS: CPT | Performed by: FAMILY MEDICINE

## 2020-12-15 PROCEDURE — G0439 PPPS, SUBSEQ VISIT: HCPCS | Performed by: FAMILY MEDICINE

## 2020-12-15 PROCEDURE — G8536 NO DOC ELDER MAL SCRN: HCPCS | Performed by: FAMILY MEDICINE

## 2020-12-15 PROCEDURE — 99214 OFFICE O/P EST MOD 30 MIN: CPT | Performed by: FAMILY MEDICINE

## 2020-12-15 PROCEDURE — G8756 NO BP MEASURE DOC: HCPCS | Performed by: FAMILY MEDICINE

## 2020-12-15 PROCEDURE — 3017F COLORECTAL CA SCREEN DOC REV: CPT | Performed by: FAMILY MEDICINE

## 2020-12-15 PROCEDURE — G8399 PT W/DXA RESULTS DOCUMENT: HCPCS | Performed by: FAMILY MEDICINE

## 2020-12-15 PROCEDURE — G0463 HOSPITAL OUTPT CLINIC VISIT: HCPCS | Performed by: FAMILY MEDICINE

## 2020-12-15 PROCEDURE — 1101F PT FALLS ASSESS-DOCD LE1/YR: CPT | Performed by: FAMILY MEDICINE

## 2020-12-15 PROCEDURE — G8421 BMI NOT CALCULATED: HCPCS | Performed by: FAMILY MEDICINE

## 2020-12-15 RX ORDER — VALSARTAN AND HYDROCHLOROTHIAZIDE 320; 12.5 MG/1; MG/1
TABLET, FILM COATED ORAL
Qty: 90 TAB | Refills: 2 | Status: SHIPPED | OUTPATIENT
Start: 2020-12-15 | End: 2021-08-20

## 2020-12-15 RX ORDER — ATORVASTATIN CALCIUM 40 MG/1
TABLET, FILM COATED ORAL
Qty: 90 TAB | Refills: 2 | Status: SHIPPED | OUTPATIENT
Start: 2020-12-15 | End: 2021-08-20

## 2020-12-15 NOTE — PROGRESS NOTES
This is the Subsequent Medicare Annual Wellness Exam, performed 12 months or more after the Initial AWV or the last Subsequent AWV    I have reviewed the patient's medical history in detail and updated the computerized patient record. Depression Risk Factor Screening:     3 most recent PHQ Screens 5/28/2019   Little interest or pleasure in doing things Not at all   Feeling down, depressed, irritable, or hopeless Not at all   Total Score PHQ 2 0       Alcohol Risk Screen   Do you average more than 1 drink per night or more than 7 drinks a week:  No    On any one occasion in the past three months have you have had more than 3 drinks containing alcohol:  No        Functional Ability and Level of Safety:   Hearing: Hearing is good. Activities of Daily Living: The home contains: no safety equipment. Patient does total self care     Ambulation: with no difficulty     Fall Risk:  Fall Risk Assessment, last 12 mths 5/28/2019   Able to walk? Yes   Fall in past 12 months? No     Abuse Screen:  Patient is not abused       Cognitive Screening   Has your family/caregiver stated any concerns about your memory: no         Assessment/Plan   Education and counseling provided:  Are appropriate based on today's review and evaluation    Diagnoses and all orders for this visit:    1. Encounter for Medicare annual wellness exam    2. Hyperlipidemia, unspecified hyperlipidemia type  -     atorvastatin (LIPITOR) 40 mg tablet; TAKE 1 TABLET BY MOUTH ONCE DAILY  -     CBC W/O DIFF; Future  -     METABOLIC PANEL, COMPREHENSIVE; Future  -     LIPID PANEL; Future    3. HTN, goal below 150/90  -     valsartan-hydroCHLOROthiazide (DIOVAN-HCT) 320-12.5 mg per tablet; Take 1 tablet by mouth once daily    4. Vitamin D deficiency  -     VITAMIN D, 25 HYDROXY; Future    5. Glucose intolerance (impaired glucose tolerance)  -     HEMOGLOBIN A1C WITH EAG;  Future        Health Maintenance Due     Health Maintenance Due   Topic Date Due    Shingrix Vaccine Age 50> (1 of 2) 10/14/2005    Flu Vaccine (1) 09/01/2020    Bone Densitometry  09/12/2020    Medicare Yearly Exam  10/06/2020    GLAUCOMA SCREENING Q2Y  10/14/2020    Pneumococcal 65+ years (1 of 1 - PPSV23) 10/14/2020    Lipid Screen  11/25/2020       Patient Care Team   Patient Care Team:  Josef Smith MD as PCP - General (Family Medicine)  Josef Smith MD as PCP - [facility] Empaneled Provider  Swathi Ledezma MD (Gastroenterology)  Trevor Hamm DO (Obstetrics & Gynecology)    History     Patient Active Problem List   Diagnosis Code    Hypercholesterolemia E78.00    HTN, goal below 150/90 I10    Basal cell carcinoma C44.91    Shoulder pain M25.519    Glucose intolerance (impaired glucose tolerance) R73.02    Vitamin D deficiency E55.9    Osteopenia M85.80    DUB (dysfunctional uterine bleeding) N93.8    Degenerative arthritis of knee M17.10    Broken toe S92.919A    Severe obesity (BMI 35.0-39. 9) with comorbidity ([facility]) E66.01     Past Medical History:   Diagnosis Date    Basal cell carcinoma     Dr Linette Barbosa toe 04/20/2015    Great toe of L foot     Degenerative arthritis of knee     L  Li/os    DUB (dysfunctional uterine bleeding) 2005    Encounter for screening colonoscopy 12/11/2017    Dr. Jean Carlos Thao - repeat colonoscopy depends on polyp pathology, possibly in 5 years    Fracture     Right ankle fx with surgery; sports related (years ago)    Glucose intolerance (impaired glucose tolerance) 1/9/2015    A1C=6% on 1/9/15     HTN, goal below 150/90     Hypercholesterolemia     heart scan ca 0 2004    Osteopenia 2/11    repeat  DEXA 2/13    Unspecified vitamin D deficiency 6/10      Past Surgical History:   Procedure Laterality Date    COLONOSCOPY  2007    repeat 2017    HX CARPAL TUNNEL RELEASE      HX DILATION AND CURETTAGE Bilateral     HX ORTHOPAEDIC  2019    left hand - carpal tunel      Current Outpatient Medications Medication Sig Dispense Refill    atorvastatin (LIPITOR) 40 mg tablet TAKE 1 TABLET BY MOUTH ONCE DAILY 90 Tab 2    valsartan-hydroCHLOROthiazide (DIOVAN-HCT) 320-12.5 mg per tablet Take 1 tablet by mouth once daily 90 Tab 2    cholecalciferol (VITAMIN D3) (2,000 UNITS /50 MCG) cap capsule Take 2,000 Units by mouth daily. 90 Cap 1    Cetirizine (ZYRTEC) 10 mg cap Take 10 mg by mouth daily.  GLUCOSAMINE-CONDROITIN-HRB#182 PO Take 3,000 mg by mouth daily.  ibuprofen (MOTRIN IB) 200 mg tablet Take 600 mg by mouth every six (6) hours as needed. Indications: PAIN      aspirin 81 mg chewable tablet Take 81 mg by mouth daily. Indications: MYOCARDIAL INFARCTION PREVENTION      DOCOSAHEXANOIC ACID/EPA (FISH OIL PO) Take  by mouth daily. No Known Allergies    Family History   Problem Relation Age of Onset    Elevated Lipids Mother     Hypertension Mother     Cancer Maternal Grandmother         cervical    Diabetes Maternal Grandmother     Heart Disease Maternal Grandfather         MI   [de-identified] Elevated Lipids Father     Hypertension Father     Diabetes Father         type 2     Social History     Tobacco Use    Smoking status: Never Smoker    Smokeless tobacco: Never Used   Substance Use Topics    Alcohol use:  Yes     Alcohol/week: 1.0 standard drinks     Types: 1 Glasses of wine per week     Comment: rarely

## 2020-12-15 NOTE — PROGRESS NOTES
Lacho Koroma, who was evaluated through a synchronous (real-time) audio-video encounter, and/or her healthcare decision maker, is aware that it is a billable service, with coverage as determined by her insurance carrier. She provided verbal consent to proceed: YES, and patient identification was verified. It was conducted pursuant to the emergency declaration under the [facility][note 3][facility], [facility] authority and the [employer] Act. A caregiver was present when appropriate. Ability to conduct physical exam was limited. I was at home. The patient was at home. This virtual visit was conducted via [employer]. Pursuant to the emergency declaration under the [address] [place] [address], [address] [place] [address] authority and the [employer] and [employer] Act, this Virtual  Visit was conducted to reduce the patient's risk of exposure to COVID-19 and provide continuity of care for an established patient. Services were provided through a video synchronous discussion virtually to substitute for in-person clinic visit. Due to this being a TeleHealth evaluation, many elements of the physical examination are unable to be assessed. Total Time: minutes: 11-20 minutes. Megan Officer, MD    712  Subjective:   Lacho Koroma was seen for: Had Mohs surgery earlier this year. BP has been WNL. Tolerating atorvastatin. Prior to Admission medications    Medication Sig Start Date End Date Taking? Authorizing Provider   atorvastatin (LIPITOR) 40 mg tablet TAKE 1 TABLET BY MOUTH ONCE DAILY **STOP SIMVASTATIN** 11/23/20   Barry Molina MD   valsartan-hydroCHLOROthiazide (DIOVAN-HCT) 320-12.5 mg per tablet Take 1 tablet by mouth once daily 11/23/20   Barry Molina MD   cholecalciferol (VITAMIN D3) (2,000 UNITS /50 MCG) cap capsule Take 2,000 Units by mouth daily.  5/13/20 Holsinger, Claud Epley, NP   Cetirizine (ZYRTEC) 10 mg cap Take 10 mg by mouth daily. Provider, Historical   GLUCOSAMINE-CONDROITIN-HRB#182 PO Take 3,000 mg by mouth daily. Provider, Historical   ibuprofen (MOTRIN IB) 200 mg tablet Take 600 mg by mouth every six (6) hours as needed. Indications: PAIN    Provider, Historical   aspirin 81 mg chewable tablet Take 81 mg by mouth daily. Indications: MYOCARDIAL INFARCTION PREVENTION    Provider, Historical   DOCOSAHEXANOIC ACID/EPA (FISH OIL PO) Take  by mouth daily. Provider, Historical       No Known Allergies    Review of Systems   Constitutional: Negative for fever, malaise/fatigue and weight loss. Respiratory: Negative for cough, hemoptysis, shortness of breath and wheezing. Cardiovascular: Negative for chest pain, palpitations, leg swelling and PND. Gastrointestinal: Negative for abdominal pain, constipation, diarrhea, nausea and vomiting. Physical Exam:     Visit Vitals  LMP 07/30/2013        General: alert, cooperative, no distress   Mental  status: normal mood, behavior, speech, dress, motor activity, and thought processes, able to follow commands   HENT: NCAT   Neck: no visualized mass   Resp: no respiratory distress   Neuro: no gross deficits   Skin: no discoloration or lesions of concern on visible areas   Psychiatric: normal affect, consistent with stated mood, no evidence of hallucinations       Assessment & Plan:     Yaneth Watson is a 72 y.o. female who presents today for:    1. Encounter for Medicare annual wellness exam    2. Hyperlipidemia, unspecified hyperlipidemia type  Stable, continue current treatment pending review of labs. - atorvastatin (LIPITOR) 40 mg tablet; TAKE 1 TABLET BY MOUTH ONCE DAILY  Dispense: 90 Tab; Refill: 2  - CBC W/O DIFF; Future  - METABOLIC PANEL, COMPREHENSIVE; Future  - LIPID PANEL; Future    3.  HTN, goal below 150/90  Stable, continue current treatment.  - valsartan-hydroCHLOROthiazide (DIOVAN-HCT) 320-12.5 mg per tablet; Take 1 tablet by mouth once daily  Dispense: 90 Tab; Refill: 2    4. Vitamin D deficiency  - VITAMIN D, 25 HYDROXY; Future    5. Glucose intolerance (impaired glucose tolerance)  - HEMOGLOBIN A1C WITH EAG; Future       Medications Discontinued During This Encounter   Medication Reason    atorvastatin (LIPITOR) 40 mg tablet REORDER    valsartan-hydroCHLOROthiazide (DIOVAN-HCT) 320-12.5 mg per tablet REORDER     Follow-up and Dispositions    · Return in about 6 months (around 6/15/2021) for HTN follow up. Treatment risks/benefits/costs/interactions/alternatives discussed with patient. Advised patient to call back or return to office if symptoms worsen/change/persist. If patient cannot reach us or should anything more severe/urgent arise he/she should proceed directly to the nearest emergency department. Discussed expected course/resolution/complications of diagnosis in detail with patient. Patient expressed understanding with the diagnosis and plan. Jay Simmons M.D.

## 2021-01-11 ENCOUNTER — LAB ONLY (OUTPATIENT)
Dept: FAMILY MEDICINE CLINIC | Age: 66
End: 2021-01-11

## 2021-01-11 DIAGNOSIS — E78.5 HYPERLIPIDEMIA, UNSPECIFIED HYPERLIPIDEMIA TYPE: ICD-10-CM

## 2021-01-11 DIAGNOSIS — E55.9 VITAMIN D DEFICIENCY: ICD-10-CM

## 2021-01-11 DIAGNOSIS — R73.02 GLUCOSE INTOLERANCE (IMPAIRED GLUCOSE TOLERANCE): ICD-10-CM

## 2021-01-12 LAB
25(OH)D3 SERPL-MCNC: 38 NG/ML (ref 30–100)
ALBUMIN SERPL-MCNC: 4.4 G/DL (ref 3.5–5)
ALBUMIN/GLOB SERPL: 1.4 {RATIO} (ref 1.1–2.2)
ALP SERPL-CCNC: 82 U/L (ref 45–117)
ALT SERPL-CCNC: 38 U/L (ref 12–78)
ANION GAP SERPL CALC-SCNC: 3 MMOL/L (ref 5–15)
AST SERPL-CCNC: 24 U/L (ref 15–37)
BILIRUB SERPL-MCNC: 0.9 MG/DL (ref 0.2–1)
BUN SERPL-MCNC: 21 MG/DL (ref 6–20)
BUN/CREAT SERPL: 23 (ref 12–20)
CALCIUM SERPL-MCNC: 9.6 MG/DL (ref 8.5–10.1)
CHLORIDE SERPL-SCNC: 106 MMOL/L (ref 97–108)
CHOLEST SERPL-MCNC: 162 MG/DL
CO2 SERPL-SCNC: 29 MMOL/L (ref 21–32)
CREAT SERPL-MCNC: 0.9 MG/DL (ref 0.55–1.02)
ERYTHROCYTE [DISTWIDTH] IN BLOOD BY AUTOMATED COUNT: 12.6 % (ref 11.5–14.5)
EST. AVERAGE GLUCOSE BLD GHB EST-MCNC: 120 MG/DL
GLOBULIN SER CALC-MCNC: 3.1 G/DL (ref 2–4)
GLUCOSE SERPL-MCNC: 105 MG/DL (ref 65–100)
HBA1C MFR BLD: 5.8 % (ref 4–5.6)
HCT VFR BLD AUTO: 40.4 % (ref 35–47)
HDLC SERPL-MCNC: 52 MG/DL
HDLC SERPL: 3.1 {RATIO} (ref 0–5)
HGB BLD-MCNC: 13 G/DL (ref 11.5–16)
LDLC SERPL CALC-MCNC: 84 MG/DL (ref 0–100)
LIPID PROFILE,FLP: NORMAL
MCH RBC QN AUTO: 29.7 PG (ref 26–34)
MCHC RBC AUTO-ENTMCNC: 32.2 G/DL (ref 30–36.5)
MCV RBC AUTO: 92.2 FL (ref 80–99)
NRBC # BLD: 0 K/UL (ref 0–0.01)
NRBC BLD-RTO: 0 PER 100 WBC
PLATELET # BLD AUTO: 222 K/UL (ref 150–400)
PMV BLD AUTO: 10.5 FL (ref 8.9–12.9)
POTASSIUM SERPL-SCNC: 4.1 MMOL/L (ref 3.5–5.1)
PROT SERPL-MCNC: 7.5 G/DL (ref 6.4–8.2)
RBC # BLD AUTO: 4.38 M/UL (ref 3.8–5.2)
SODIUM SERPL-SCNC: 138 MMOL/L (ref 136–145)
TRIGL SERPL-MCNC: 130 MG/DL (ref ?–150)
VLDLC SERPL CALC-MCNC: 26 MG/DL
WBC # BLD AUTO: 5.8 K/UL (ref 3.6–11)

## 2021-01-14 NOTE — PROGRESS NOTES
Dear Ms. Cooper,    I wanted to follow up on your recent test results:    Hemoglobin A1C (average blood sugar level for past 3 months) is in the pre diabetes range, which means your average sugar or glucose level is higher than normal. I would encourage healthy food choices and regular exercise with the goal of maintaining a healthy weight before starting medications for this.   All other labs are normal.

## 2021-09-13 ENCOUNTER — TRANSCRIBE ORDER (OUTPATIENT)
Dept: SCHEDULING | Age: 66
End: 2021-09-13

## 2021-09-13 DIAGNOSIS — Z12.31 VISIT FOR SCREENING MAMMOGRAM: Primary | ICD-10-CM

## 2021-10-14 ENCOUNTER — HOSPITAL ENCOUNTER (OUTPATIENT)
Dept: MAMMOGRAPHY | Age: 66
Discharge: HOME | End: 2021-10-14
Attending: FAMILY MEDICINE
Payer: MEDICARE

## 2021-10-14 DIAGNOSIS — Z12.31 VISIT FOR SCREENING MAMMOGRAM: ICD-10-CM

## 2021-10-14 PROCEDURE — 77067 SCR MAMMO BI INCL CAD: CPT

## 2021-10-14 PROCEDURE — 77063 BREAST TOMOSYNTHESIS BI: CPT

## 2021-11-23 ENCOUNTER — OFFICE VISIT (OUTPATIENT)
Dept: ORTHOPEDIC SURGERY | Age: 66
End: 2021-11-23
Payer: MEDICARE

## 2021-11-23 DIAGNOSIS — M17.12 PATELLOFEMORAL ARTHRITIS OF LEFT KNEE: ICD-10-CM

## 2021-11-23 DIAGNOSIS — G89.29 CHRONIC PAIN OF LEFT KNEE: Primary | ICD-10-CM

## 2021-11-23 DIAGNOSIS — M25.562 CHRONIC PAIN OF LEFT KNEE: Primary | ICD-10-CM

## 2021-11-23 DIAGNOSIS — M25.562 ACUTE PAIN OF LEFT KNEE: ICD-10-CM

## 2021-11-23 PROCEDURE — G8756 NO BP MEASURE DOC: HCPCS | Performed by: ORTHOPAEDIC SURGERY

## 2021-11-23 PROCEDURE — G8399 PT W/DXA RESULTS DOCUMENT: HCPCS | Performed by: ORTHOPAEDIC SURGERY

## 2021-11-23 PROCEDURE — G8432 DEP SCR NOT DOC, RNG: HCPCS | Performed by: ORTHOPAEDIC SURGERY

## 2021-11-23 PROCEDURE — G8536 NO DOC ELDER MAL SCRN: HCPCS | Performed by: ORTHOPAEDIC SURGERY

## 2021-11-23 PROCEDURE — G9899 SCRN MAM PERF RSLTS DOC: HCPCS | Performed by: ORTHOPAEDIC SURGERY

## 2021-11-23 PROCEDURE — 3017F COLORECTAL CA SCREEN DOC REV: CPT | Performed by: ORTHOPAEDIC SURGERY

## 2021-11-23 PROCEDURE — G8427 DOCREV CUR MEDS BY ELIG CLIN: HCPCS | Performed by: ORTHOPAEDIC SURGERY

## 2021-11-23 PROCEDURE — 1101F PT FALLS ASSESS-DOCD LE1/YR: CPT | Performed by: ORTHOPAEDIC SURGERY

## 2021-11-23 PROCEDURE — 99214 OFFICE O/P EST MOD 30 MIN: CPT | Performed by: ORTHOPAEDIC SURGERY

## 2021-11-23 PROCEDURE — 1090F PRES/ABSN URINE INCON ASSESS: CPT | Performed by: ORTHOPAEDIC SURGERY

## 2021-11-23 PROCEDURE — G8417 CALC BMI ABV UP PARAM F/U: HCPCS | Performed by: ORTHOPAEDIC SURGERY

## 2021-11-24 VITALS — HEIGHT: 60 IN | WEIGHT: 175 LBS | BODY MASS INDEX: 34.36 KG/M2

## 2021-11-24 PROCEDURE — 20610 DRAIN/INJ JOINT/BURSA W/O US: CPT | Performed by: ORTHOPAEDIC SURGERY

## 2021-11-24 RX ORDER — BUPIVACAINE HYDROCHLORIDE 2.5 MG/ML
6 INJECTION, SOLUTION INFILTRATION; PERINEURAL ONCE
Status: COMPLETED | OUTPATIENT
Start: 2021-11-24 | End: 2021-11-24

## 2021-11-24 RX ORDER — METHYLPREDNISOLONE ACETATE 80 MG/ML
80 INJECTION, SUSPENSION INTRA-ARTICULAR; INTRALESIONAL; INTRAMUSCULAR; SOFT TISSUE ONCE
Status: COMPLETED | OUTPATIENT
Start: 2021-11-24 | End: 2021-11-24

## 2021-11-24 RX ADMIN — METHYLPREDNISOLONE ACETATE 80 MG: 80 INJECTION, SUSPENSION INTRA-ARTICULAR; INTRALESIONAL; INTRAMUSCULAR; SOFT TISSUE at 14:51

## 2021-11-24 RX ADMIN — BUPIVACAINE HYDROCHLORIDE 15 MG: 2.5 INJECTION, SOLUTION INFILTRATION; PERINEURAL at 14:51

## 2021-11-24 NOTE — PROGRESS NOTES
Landry Smyth (: 1955) is a 77 y.o. female, patient, here for evaluation of the following chief complaint(s):  Knee Pain (left)       HPI:    She began having increased left knee pain approximately 3 months ago. The patient reports no specific injury but does state that her pain came on suddenly. She describes her pain now as severe, sharp, aching, and constant. Her left knee pain does not make it difficult for her to go to sleep and does not wake her up from sleep. She states that over the last 4 to 6 weeks her knee pain has started to improve slightly. She reports that standing, walking, lifting, exercise, and stairs make her pain worse and rest makes her pain better. She has been taking Advil for her discomfort as needed. She has tried a previous injection in her left knee. The patient has had previous x-rays performed on her left knee which were reviewed today. No Known Allergies    Current Outpatient Medications   Medication Sig    valsartan-hydroCHLOROthiazide (DIOVAN-HCT) 320-12.5 mg per tablet Take 1 tablet by mouth once daily    atorvastatin (LIPITOR) 40 mg tablet Take 1 tablet by mouth once daily    cholecalciferol (VITAMIN D3) (2,000 UNITS /50 MCG) cap capsule Take 2,000 Units by mouth daily.  Cetirizine (ZYRTEC) 10 mg cap Take 10 mg by mouth daily.  GLUCOSAMINE-CONDROITIN-HRB#182 PO Take 3,000 mg by mouth daily.  ibuprofen (MOTRIN IB) 200 mg tablet Take 600 mg by mouth every six (6) hours as needed. Indications: PAIN    aspirin 81 mg chewable tablet Take 81 mg by mouth daily. Indications: MYOCARDIAL INFARCTION PREVENTION    DOCOSAHEXANOIC ACID/EPA (FISH OIL PO) Take  by mouth daily. No current facility-administered medications for this visit.        Past Medical History:   Diagnosis Date    Basal cell carcinoma     Dr Lopez Se toe 2015    Great toe of L foot     Degenerative arthritis of knee     L  Li/os    DUB (dysfunctional uterine bleeding) 2005    Encounter for screening colonoscopy 12/11/2017    Dr. Cuco Eduardo - repeat colonoscopy depends on polyp pathology, possibly in 5 years    Fracture     Right ankle fx with surgery; sports related (years ago)    Glucose intolerance (impaired glucose tolerance) 1/9/2015    A1C=6% on 1/9/15     HTN, goal below 150/90     Hypercholesterolemia     heart scan ca 0 2004    Osteopenia 2/11    repeat  DEXA 2/13    Unspecified vitamin D deficiency 6/10        Past Surgical History:   Procedure Laterality Date    COLONOSCOPY  2007    repeat 2017    HX CARPAL TUNNEL RELEASE      HX DILATION AND CURETTAGE Bilateral     HX ORTHOPAEDIC  2019    left hand - carpal tunel        Family History   Problem Relation Age of Onset    Elevated Lipids Mother     Hypertension Mother     Cancer Maternal Grandmother         cervical    Diabetes Maternal Grandmother     Heart Disease Maternal Grandfather         MI    Elevated Lipids Father     Hypertension Father     Diabetes Father         type 2        Social History     Socioeconomic History    Marital status:      Spouse name: Not on file    Number of children: Not on file    Years of education: Not on file    Highest education level: Not on file   Occupational History    Occupation:      Employer: [employer]   Tobacco Use    Smoking status: Never Smoker    Smokeless tobacco: Never Used   Substance and Sexual Activity    Alcohol use:  Yes     Alcohol/week: 1.0 standard drink     Types: 1 Glasses of wine per week     Comment: rarely    Drug use: No    Sexual activity: Yes     Partners: Male     Birth control/protection: None   Other Topics Concern     Service Not Asked    Blood Transfusions Not Asked    Caffeine Concern Not Asked    Occupational Exposure Not Asked    Hobby Hazards Not Asked    Sleep Concern Not Asked    Stress Concern Not Asked    Weight Concern Not Asked    Special Diet Not Asked    Back Care Not Asked    Exercise Yes     Comment: exercises regularly    Bike Helmet Not Asked   [address] Not Asked    Self-Exams Not Asked   Social History Narrative    Not on file     Social Determinants of Health     Financial Resource Strain:     Difficulty of Paying Living Expenses: Not on file   Food Insecurity:     Worried About Running Out of Food in the Last Year: Not on file    Dat of Food in the Last Year: Not on file   Transportation Needs:     Lack of Transportation (Medical): Not on file    Lack of Transportation (Non-Medical): Not on file   Physical Activity:     Days of Exercise per Week: Not on file    Minutes of Exercise per Session: Not on file   Stress:     Feeling of Stress : Not on file   Social Connections:     Frequency of Communication with Friends and Family: Not on file    Frequency of Social Gatherings with Friends and Family: Not on file    Attends Orthodoxy Services: Not on file    Active Member of [address] [place] [address] or Organizations: Not on file    Attends Club or Organization Meetings: Not on file    Marital Status: Not on file   Intimate Partner Violence:     Fear of Current or Ex-Partner: Not on file    Emotionally Abused: Not on file    Physically Abused: Not on file    Sexually Abused: Not on file   Housing Stability:     Unable to Pay for Housing in the Last Year: Not on file    Number of [place] in the Last Year: Not on file    Unstable Housing in the Last Year: Not on file       Review of Systems   All other systems reviewed and are negative. Vitals:  Ht 5' (1.524 m)   Wt 175 lb (79.4 kg)   LMP 07/30/2013   BMI 34.18 kg/m²    Body mass index is 34.18 kg/m². Ortho Exam     The patient is well-developed and well-nourished. The patient presents today in alert and oriented x3 with a normal mood and affect. The patient stands with a normal weightbearing line walks with a slightly antalgic gait because of her left knee pain.     Left knee, the patient is nontender to palpation along the medial and lateral joint lines, and has no effusion. They are tender to palpation along the medial and lateral facets of the patella. They have crepitus of the patellofemoral joint with range of motion and discomfort with patella grind testing. The patient has no discomfort with Glenn's maneuvers, and the knee is stable. They have full range of motion. They have 5/5 strength, and are neurovascularly intact distally. There is no erythema, warmth or skin lesions present. ASSESSMENT/PLAN:      1. Chronic pain of left knee  2. Acute pain of left knee  3. Patellofemoral arthritis of left knee     Left knee x-rays performed at an outside facility were reviewed and they show no evidence of a fracture or dislocation. Evidence of end-stage bone-on-bone patellofemoral osteoarthritis. Below is the assessment and plan developed based on review of pertinent history, physical exam, labs, studies, and medications. We discussed the patient's ongoing left knee pain and her signs, symptoms, physical exam, description of her pain, and outside x-rays are consistent with end-stage patellofemoral osteoarthritis. The possible treatment options were discussed with the patient and we elected to inject her left knee with cortisone today to try and alleviate some of her discomfort. The risks and benefits of the injection were discussed in detail with the patient and under sterile prep the patient's left knee was injected with 1 cc of Depo-Medrol and 6 ccs of Marcaine. She tolerated the injection well. I did encourage her to continue to ice and elevate when possible, modify her activity level based on her left knee pain, and use anti-inflammatory medication when necessary. The patient will also work on range of motion, strengthening, and stretching exercises with an at-home exercise program as pain tolerates.   She is to avoid any deep knee bend activities against resistance, squatting, kneeling, stairs, lunging, and high impact loading activities. I will see her back on an as-needed basis for her left knee pain. If she continues to have persistence of her discomfort she will follow up with one of our total joint specialist and we will refer to his treatment plan moving forward. Return in about 4 weeks (around 12/21/2021), or if symptoms worsen or fail to improve. An electronic signature was used to authenticate this note.   -- Hermelinda Kirk MD

## 2022-01-23 DIAGNOSIS — I10 HTN, GOAL BELOW 150/90: ICD-10-CM

## 2022-01-23 DIAGNOSIS — E78.5 HYPERLIPIDEMIA, UNSPECIFIED HYPERLIPIDEMIA TYPE: ICD-10-CM

## 2022-01-24 NOTE — TELEPHONE ENCOUNTER
Requested Prescriptions     Pending Prescriptions Disp Refills    valsartan-hydroCHLOROthiazide (DIOVAN-HCT) 320-12.5 mg per tablet [Pharmacy Med Name: Valsartan-hydroCHLOROthiazide 320-12.5 MG Oral Tablet] 90 Tablet 0     Sig: Take 1 tablet by mouth once daily    atorvastatin (LIPITOR) 40 mg tablet [Pharmacy Med Name: Atorvastatin Calcium 40 MG Oral Tablet] 90 Tablet 0     Sig: Take 1 tablet by mouth once daily     LOV: 12/15/2020     Patient overdue for med check, please call the schedule.

## 2022-01-25 RX ORDER — ATORVASTATIN CALCIUM 40 MG/1
TABLET, FILM COATED ORAL
Qty: 90 TABLET | Refills: 0 | Status: SHIPPED | OUTPATIENT
Start: 2022-01-25 | End: 2022-04-25 | Stop reason: SDUPTHER

## 2022-01-25 RX ORDER — VALSARTAN AND HYDROCHLOROTHIAZIDE 320; 12.5 MG/1; MG/1
TABLET, FILM COATED ORAL
Qty: 90 TABLET | Refills: 0 | Status: SHIPPED | OUTPATIENT
Start: 2022-01-25 | End: 2022-04-25 | Stop reason: SDUPTHER

## 2022-02-02 ENCOUNTER — OFFICE VISIT (OUTPATIENT)
Dept: FAMILY MEDICINE CLINIC | Age: 67
End: 2022-02-02
Payer: MEDICARE

## 2022-02-02 VITALS
OXYGEN SATURATION: 95 % | DIASTOLIC BLOOD PRESSURE: 71 MMHG | BODY MASS INDEX: 35.38 KG/M2 | HEIGHT: 60 IN | WEIGHT: 180.2 LBS | TEMPERATURE: 97.7 F | HEART RATE: 67 BPM | SYSTOLIC BLOOD PRESSURE: 108 MMHG | RESPIRATION RATE: 16 BRPM

## 2022-02-02 DIAGNOSIS — I10 HTN, GOAL BELOW 150/90: Primary | ICD-10-CM

## 2022-02-02 DIAGNOSIS — E55.9 VITAMIN D DEFICIENCY: ICD-10-CM

## 2022-02-02 DIAGNOSIS — R73.02 GLUCOSE INTOLERANCE (IMPAIRED GLUCOSE TOLERANCE): ICD-10-CM

## 2022-02-02 DIAGNOSIS — E66.01 SEVERE OBESITY (BMI 35.0-35.9 WITH COMORBIDITY) (HCC): ICD-10-CM

## 2022-02-02 DIAGNOSIS — E78.5 HYPERLIPIDEMIA, UNSPECIFIED HYPERLIPIDEMIA TYPE: ICD-10-CM

## 2022-02-02 LAB
ALBUMIN SERPL-MCNC: 3.9 G/DL (ref 3.5–5)
ALBUMIN/GLOB SERPL: 1.2 {RATIO} (ref 1.1–2.2)
ALP SERPL-CCNC: 79 U/L (ref 45–117)
ALT SERPL-CCNC: 27 U/L (ref 12–78)
ANION GAP SERPL CALC-SCNC: 4 MMOL/L (ref 5–15)
AST SERPL-CCNC: 19 U/L (ref 15–37)
BILIRUB SERPL-MCNC: 1 MG/DL (ref 0.2–1)
BUN SERPL-MCNC: 18 MG/DL (ref 6–20)
BUN/CREAT SERPL: 22 (ref 12–20)
CALCIUM SERPL-MCNC: 9.8 MG/DL (ref 8.5–10.1)
CHLORIDE SERPL-SCNC: 105 MMOL/L (ref 97–108)
CHOLEST SERPL-MCNC: 144 MG/DL
CO2 SERPL-SCNC: 29 MMOL/L (ref 21–32)
CREAT SERPL-MCNC: 0.83 MG/DL (ref 0.55–1.02)
ERYTHROCYTE [DISTWIDTH] IN BLOOD BY AUTOMATED COUNT: 12.2 % (ref 11.5–14.5)
EST. AVERAGE GLUCOSE BLD GHB EST-MCNC: 123 MG/DL
GLOBULIN SER CALC-MCNC: 3.2 G/DL (ref 2–4)
GLUCOSE SERPL-MCNC: 109 MG/DL (ref 65–100)
HBA1C MFR BLD: 5.9 % (ref 4–5.6)
HCT VFR BLD AUTO: 40.2 % (ref 35–47)
HDLC SERPL-MCNC: 42 MG/DL
HDLC SERPL: 3.4 {RATIO} (ref 0–5)
HGB BLD-MCNC: 13.3 G/DL (ref 11.5–16)
LDLC SERPL CALC-MCNC: 67.8 MG/DL (ref 0–100)
MCH RBC QN AUTO: 30.1 PG (ref 26–34)
MCHC RBC AUTO-ENTMCNC: 33.1 G/DL (ref 30–36.5)
MCV RBC AUTO: 91 FL (ref 80–99)
NRBC # BLD: 0 K/UL (ref 0–0.01)
NRBC BLD-RTO: 0 PER 100 WBC
PLATELET # BLD AUTO: 255 K/UL (ref 150–400)
PMV BLD AUTO: 10 FL (ref 8.9–12.9)
POTASSIUM SERPL-SCNC: 4.3 MMOL/L (ref 3.5–5.1)
PROT SERPL-MCNC: 7.1 G/DL (ref 6.4–8.2)
RBC # BLD AUTO: 4.42 M/UL (ref 3.8–5.2)
SODIUM SERPL-SCNC: 138 MMOL/L (ref 136–145)
TRIGL SERPL-MCNC: 171 MG/DL (ref ?–150)
VLDLC SERPL CALC-MCNC: 34.2 MG/DL
WBC # BLD AUTO: 7.1 K/UL (ref 3.6–11)

## 2022-02-02 PROCEDURE — G8752 SYS BP LESS 140: HCPCS | Performed by: NURSE PRACTITIONER

## 2022-02-02 PROCEDURE — G9899 SCRN MAM PERF RSLTS DOC: HCPCS | Performed by: NURSE PRACTITIONER

## 2022-02-02 PROCEDURE — G8754 DIAS BP LESS 90: HCPCS | Performed by: NURSE PRACTITIONER

## 2022-02-02 PROCEDURE — G8432 DEP SCR NOT DOC, RNG: HCPCS | Performed by: NURSE PRACTITIONER

## 2022-02-02 PROCEDURE — G8399 PT W/DXA RESULTS DOCUMENT: HCPCS | Performed by: NURSE PRACTITIONER

## 2022-02-02 PROCEDURE — G8427 DOCREV CUR MEDS BY ELIG CLIN: HCPCS | Performed by: NURSE PRACTITIONER

## 2022-02-02 PROCEDURE — 99214 OFFICE O/P EST MOD 30 MIN: CPT | Performed by: NURSE PRACTITIONER

## 2022-02-02 PROCEDURE — G8536 NO DOC ELDER MAL SCRN: HCPCS | Performed by: NURSE PRACTITIONER

## 2022-02-02 PROCEDURE — 1090F PRES/ABSN URINE INCON ASSESS: CPT | Performed by: NURSE PRACTITIONER

## 2022-02-02 PROCEDURE — G0463 HOSPITAL OUTPT CLINIC VISIT: HCPCS | Performed by: NURSE PRACTITIONER

## 2022-02-02 PROCEDURE — 3017F COLORECTAL CA SCREEN DOC REV: CPT | Performed by: NURSE PRACTITIONER

## 2022-02-02 PROCEDURE — 1101F PT FALLS ASSESS-DOCD LE1/YR: CPT | Performed by: NURSE PRACTITIONER

## 2022-02-02 PROCEDURE — G8417 CALC BMI ABV UP PARAM F/U: HCPCS | Performed by: NURSE PRACTITIONER

## 2022-02-02 NOTE — PROGRESS NOTES
[place] [facility] Note  Subjective:      Tasha Sosa is a 77 y.o. female who presents for follow up on chronic problems, she has history of hypertension, hyperlipidemia, obesity, vitamin D deficiency and obesity . Taking medications as prescribed, no medications side effects reported . Past Medical History:   Diagnosis Date    Basal cell carcinoma     Dr Jorge Miller toe 04/20/2015    Great toe of L foot     Degenerative arthritis of knee     L  Li/os    DUB (dysfunctional uterine bleeding) 2005    Encounter for screening colonoscopy 12/11/2017    Dr. Kate Patiño - repeat colonoscopy depends on polyp pathology, possibly in 5 years    Fracture     Right ankle fx with surgery; sports related (years ago)    Glucose intolerance (impaired glucose tolerance) 1/9/2015    A1C=6% on 1/9/15     HTN, goal below 150/90     Hypercholesterolemia     heart scan ca 0 2004    Osteopenia 2/11    repeat  DEXA 2/13    Unspecified vitamin D deficiency 6/10     Past Surgical History:   Procedure Laterality Date    COLONOSCOPY  2007    repeat 2017    HX CARPAL TUNNEL RELEASE      HX DILATION AND CURETTAGE Bilateral     HX ORTHOPAEDIC  2019    left hand - carpal tunel        Current Outpatient Medications   Medication Sig Dispense Refill    valsartan-hydroCHLOROthiazide (DIOVAN-HCT) 320-12.5 mg per tablet Take 1 tablet by mouth once daily 90 Tablet 0    atorvastatin (LIPITOR) 40 mg tablet Take 1 tablet by mouth once daily 90 Tablet 0    cholecalciferol (VITAMIN D3) (2,000 UNITS /50 MCG) cap capsule Take 2,000 Units by mouth daily. 90 Cap 1    Cetirizine (ZYRTEC) 10 mg cap Take 10 mg by mouth daily.  GLUCOSAMINE-CONDROITIN-HRB#182 PO Take 3,000 mg by mouth daily.  ibuprofen (MOTRIN IB) 200 mg tablet Take 600 mg by mouth every six (6) hours as needed. Indications: PAIN      aspirin 81 mg chewable tablet Take 81 mg by mouth daily.  Indications: MYOCARDIAL INFARCTION PREVENTION (Patient not taking: Reported on 2/2/2022)      DOCOSAHEXANOIC ACID/EPA (FISH OIL PO) Take  by mouth daily. (Patient not taking: Reported on 2/2/2022)       No Known Allergies    ROS:   Complete review of systems was reviewed with pertinent information listed in HPI. Review of Systems   Constitutional: Negative. HENT: Negative. Respiratory: Negative. Cardiovascular: Negative. Gastrointestinal: Negative. Genitourinary: Negative. Musculoskeletal: Negative. Objective:     Visit Vitals  /71 (BP 1 Location: Right upper arm, BP Patient Position: Sitting, BP Cuff Size: Large adult)   Pulse 67   Temp 97.7 °F (36.5 °C) (Temporal)   Resp 16   Ht 5' (1.524 m)   Wt 180 lb 3.2 oz (81.7 kg)   LMP 07/30/2013   SpO2 95%   BMI 35.19 kg/m²       Vitals and Nurse Documentation reviewed. Physical Exam  Constitutional:       Appearance: Normal appearance. HENT:      Mouth/Throat:      Mouth: Mucous membranes are moist.   Cardiovascular:      Rate and Rhythm: Normal rate and regular rhythm. Pulses: Normal pulses. Heart sounds: Normal heart sounds. No murmur heard. Pulmonary:      Effort: Pulmonary effort is normal.      Breath sounds: Normal breath sounds. Abdominal:      General: Bowel sounds are normal.      Palpations: Abdomen is soft. Musculoskeletal:      Cervical back: Normal range of motion and neck supple. Neurological:      Mental Status: She is alert. Psychiatric:         Mood and Affect: Mood normal.         Thought Content: Thought content normal.         Assessment/Plan:   Diagnoses and all orders for this visit:    1. HTN, goal below 150/90  -     CBC W/O DIFF; Future    2. Vitamin D deficiency  -     METABOLIC PANEL, COMPREHENSIVE; Future    3. Glucose intolerance (impaired glucose tolerance)  -     HEMOGLOBIN A1C WITH EAG; Future    4. Hyperlipidemia, unspecified hyperlipidemia type  -     LIPID PANEL; Future    5.  Severe obesity (BMI 35.0-35.9 with comorbidity) ([facility] [address]  Diet and exercise  Follow up in six months          Pt expressed understanding with the diagnosis and plan        Discussed expected course/resolution/complications of diagnosis in detail with patient.    Medication risks/benefits/costs/interactions/alternatives discussed with patient.    Pt was given an after visit summary which includes diagnoses, current medications & vitals.  Pt expressed understanding with the diagnosis and plan

## 2022-02-02 NOTE — PROGRESS NOTES
Chief Complaint   Patient presents with    Medication Evaluation       1. Have you been to the ER, urgent care clinic since your last visit? Hospitalized since your last visit? No    2. Have you seen or consulted any other health care providers outside of the [address] [place] [address] [place] since your last visit? Include any pap smears or colon screening. Yes When: 08/2021 Where: Ortho in [place]  Reason for visit: knee pain   [facility] Dr. Fiona Hare, Gustavo Pascual PA-C    3. For patients over 45: Has the patient had a colonoscopy? Yes - no Care Gap present     If the patient is female:    4. For patients over 40: Has the patient had a mammogram? Yes - no Care Gap present    5. For patients over 21: Has the patient had a pap smear? Yes - no Care Gap present    3 most recent PHQ Screens 2/2/2022   Little interest or pleasure in doing things Not at all   Feeling down, depressed, irritable, or hopeless Not at all   Total Score PHQ 2 0       Fall Risk Assessment, last 12 mths 2/2/2022   Able to walk? Yes   Fall in past 12 months? 0   Do you feel unsteady? 0   Are you worried about falling 0       ADL Assessment 2/2/2022   Feeding yourself No Help Needed   Getting from bed to chair No Help Needed   Getting dressed No Help Needed   Bathing or showering No Help Needed   Walk across the room (includes cane/walker) No Help Needed   Using the telphone No Help Needed   Taking your medications No Help Needed   Preparing meals No Help Needed   Managing money (expenses/bills) No Help Needed   Moderately strenuous housework (laundry) No Help Needed   Shopping for personal items (toiletries/medicines) No Help Needed   Shopping for groceries No Help Needed   Driving No Help Needed   Climbing a flight of stairs No Help Needed   Getting to places beyond walking distances No Help Needed       Abuse Screening Questionnaire 2/2/2022   Do you ever feel afraid of your partner?  N   Are you in a relationship with someone who physically or mentally threatens you? N   Is it safe for you to go home?  Katherin Paris

## 2022-03-19 PROBLEM — E66.01 SEVERE OBESITY (BMI 35.0-39.9) WITH COMORBIDITY (HCC): Status: ACTIVE | Noted: 2018-04-20

## 2022-04-25 DIAGNOSIS — I10 HTN, GOAL BELOW 150/90: ICD-10-CM

## 2022-04-25 DIAGNOSIS — E78.5 HYPERLIPIDEMIA, UNSPECIFIED HYPERLIPIDEMIA TYPE: ICD-10-CM

## 2022-04-25 RX ORDER — VALSARTAN AND HYDROCHLOROTHIAZIDE 320; 12.5 MG/1; MG/1
1 TABLET, FILM COATED ORAL DAILY
Qty: 90 TABLET | Refills: 1 | Status: SHIPPED | OUTPATIENT
Start: 2022-04-25

## 2022-04-25 RX ORDER — ATORVASTATIN CALCIUM 40 MG/1
40 TABLET, FILM COATED ORAL DAILY
Qty: 90 TABLET | Refills: 1 | Status: SHIPPED | OUTPATIENT
Start: 2022-04-25

## 2022-04-25 NOTE — TELEPHONE ENCOUNTER
Chief Complaint   Patient presents with    Medication Refill     atorvastatin 40 mg tablet      Patient seen on 2/2/2022 by Merary Maria NP , follow up with PCP.   Addie Treviño LPN

## 2022-04-25 NOTE — TELEPHONE ENCOUNTER
Chief Complaint   Patient presents with    Medication Refill     VALSARTAN-HYDROCHLOROTHIAZIDE 320-12.5 MG TABLET      Patient last seen by Danuta Blandon NP on 2/2/2022. Follow up care recommended with PCP.   Brittany Ba LPN

## 2022-05-03 ENCOUNTER — HOSPITAL ENCOUNTER (EMERGENCY)
Age: 67
Discharge: HOME | End: 2022-05-03
Attending: EMERGENCY MEDICINE | Admitting: EMERGENCY MEDICINE
Payer: MEDICARE

## 2022-05-03 VITALS
WEIGHT: 183.97 LBS | BODY MASS INDEX: 35.93 KG/M2 | TEMPERATURE: 98.1 F | SYSTOLIC BLOOD PRESSURE: 101 MMHG | RESPIRATION RATE: 16 BRPM | DIASTOLIC BLOOD PRESSURE: 67 MMHG | HEART RATE: 62 BPM | OXYGEN SATURATION: 100 %
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
## 2022-05-03 DIAGNOSIS — S70.361A TICK BITE OF RIGHT THIGH, INITIAL ENCOUNTER: Primary | ICD-10-CM

## 2022-05-03 PROCEDURE — 99283 EMERGENCY DEPT VISIT LOW MDM: CPT

## 2022-05-03 PROCEDURE — 96372 THER/PROPH/DIAG INJ SC/IM: CPT

## 2022-05-03 PROCEDURE — 74011000250 HC RX REV CODE- 250: Performed by: EMERGENCY MEDICINE

## 2022-05-03 RX ORDER — DOXYCYCLINE HYCLATE 100 MG
100 TABLET ORAL 2 TIMES DAILY
Qty: 20 TABLET | Refills: 0 | Status: SHIPPED | OUTPATIENT
Start: 2022-05-03 | End: 2022-05-13

## 2022-05-03 RX ORDER — LIDOCAINE HYDROCHLORIDE AND EPINEPHRINE 10; 10 MG/ML; UG/ML
1.5 INJECTION, SOLUTION INFILTRATION; PERINEURAL
Status: COMPLETED | OUTPATIENT
Start: 2022-05-03 | End: 2022-05-03

## 2022-05-03 RX ADMIN — LIDOCAINE HYDROCHLORIDE AND EPINEPHRINE 15 MG: 10; 10 INJECTION, SOLUTION INFILTRATION; PERINEURAL at 11:26

## 2022-05-03 NOTE — ED TRIAGE NOTES
Pt states she had a tick bite, that she thinks would have happened on Friday,noticed it on Sunday, pulled the tick out. Today it is red, swollen, black dot in the center. Denies further symptom. Bit is in inner R thigh.

## 2022-05-03 NOTE — ED PROVIDER NOTES
Date of Service:  5/3/2022    Patient:  Rosa Leavitt    Chief Complaint:  Tick Bite       HPI:  Rosa Leavitt is a 77 y.o.  female who presents for evaluation of tick bite. Patient believes she had a tick on her right inner thigh on Friday, 5 days ago and removed it 2 days ago. It was on for about 2 or 3 days. She believes she got all the tick but now arrives with a red erythematous area with a raised center with a black dot in the very center in the area where the tick bit her. No other complaints.            Past Medical History:   Diagnosis Date    Basal cell carcinoma     Dr Loretta Brown toe 04/20/2015    Great toe of L foot     Degenerative arthritis of knee     L  Li/os    DUB (dysfunctional uterine bleeding) 2005    Encounter for screening colonoscopy 12/11/2017    Dr. Raina Chavez - repeat colonoscopy depends on polyp pathology, possibly in 5 years    Fracture     Right ankle fx with surgery; sports related (years ago)    Glucose intolerance (impaired glucose tolerance) 1/9/2015    A1C=6% on 1/9/15     HTN, goal below 150/90     Hypercholesterolemia     heart scan ca 0 2004    Osteopenia 2/11    repeat  DEXA 2/13    Unspecified vitamin D deficiency 6/10       Past Surgical History:   Procedure Laterality Date    COLONOSCOPY  2007    repeat 2017    HX CARPAL TUNNEL RELEASE      HX DILATION AND CURETTAGE Bilateral     HX ORTHOPAEDIC  2019    left hand - carpal tunel          Family History:   Problem Relation Age of Onset    Elevated Lipids Mother     Hypertension Mother     Cancer Maternal Grandmother         cervical    Diabetes Maternal Grandmother     Heart Disease Maternal Grandfather         MI    Elevated Lipids Father     Hypertension Father     Diabetes Father         type 2       Social History     Socioeconomic History    Marital status:      Spouse name: Not on file    Number of children: Not on file    Years of education: Not on file   [facility] Highest education level: Not on file   Occupational History    Occupation:      Employer: [employer]   Tobacco Use    Smoking status: Never Smoker    Smokeless tobacco: Never Used   Vaping Use    Vaping Use: Never used   Substance and Sexual Activity    Alcohol use: Yes     Alcohol/week: 1.0 standard drink     Types: 1 Glasses of wine per week     Comment: rarely    Drug use: No    Sexual activity: Yes     Partners: Male     Birth control/protection: None   Other Topics Concern     Service Not Asked    Blood Transfusions Not Asked    Caffeine Concern Not Asked    Occupational Exposure Not Asked    Hobby Hazards Not Asked    Sleep Concern Not Asked    Stress Concern Not Asked    Weight Concern Not Asked    Special Diet Not Asked    Back Care Not Asked    Exercise Yes     Comment: exercises regularly    Bike Helmet Not Asked    Seat Belt Not Asked    Self-Exams Not Asked   Social History Narrative    Not on file     Social Determinants of Health     Financial Resource Strain:     Difficulty of Paying Living Expenses: Not on file   Food Insecurity:     Worried About Running Out of Food in the Last Year: Not on file    Dat of Food in the Last Year: Not on file   Transportation Needs:     Lack of Transportation (Medical): Not on file    Lack of Transportation (Non-Medical):  Not on file   Physical Activity:     Days of Exercise per Week: Not on file    Minutes of Exercise per Session: Not on file   Stress:     Feeling of Stress : Not on file   Social Connections:     Frequency of Communication with Friends and Family: Not on file    Frequency of Social Gatherings with Friends and Family: Not on file    Attends Tenriism Services: Not on file    Active Member of Clubs or Organizations: Not on file    Attends Club or Organization Meetings: Not on file    Marital Status: Not on file   Intimate Partner Violence:     Fear of Current or Ex-Partner: Not on file   [facility] Emotionally Abused: Not on file    Physically Abused: Not on file    Sexually Abused: Not on file   Housing Stability:     Unable to Pay for Housing in the Last Year: Not on file    Number of Places Lived in the Last Year: Not on file    Unstable Housing in the Last Year: Not on file         ALLERGIES: Patient has no known allergies. Review of Systems   Skin: Positive for rash. All other systems reviewed and are negative. Vitals:    05/03/22 1108   BP: 118/76   Pulse: 63   Resp: 16   Temp: 98.1 °F (36.7 °C)   SpO2: 99%   Weight: 83.4 kg (183 lb 15.6 oz)            Physical Exam  Vitals and nursing note reviewed. Constitutional:       Appearance: Normal appearance. Eyes:      General: No scleral icterus. Cardiovascular:      Rate and Rhythm: Normal rate. Pulmonary:      Effort: Pulmonary effort is normal.   Abdominal:      General: Abdomen is flat. Skin:     General: Skin is warm. Capillary Refill: Capillary refill takes less than 2 seconds. Comments: 3 inch irregularly-shaped erythematous area with central raised lesion with a black dot in the middle. No pustule, no exudative process. No bull's-eye lesion. This is on the right inner proximal thigh   Neurological:      Mental Status: She is alert and oriented to person, place, and time. Psychiatric:         Mood and Affect: Mood normal.          MDM       VITAL SIGNS:  Patient Vitals for the past 4 hrs:   Pulse Resp BP SpO2   05/03/22 1145 62 16 101/67 100 %         LABS:  No results found for this or any previous visit (from the past 6 hour(s)). IMAGING:  No orders to display         Medications During Visit:  Medications   lidocaine-EPINEPHrine (XYLOCAINE) 1 %-1:100,000 injection 15 mg (15 mg IntraDERMal Given by Provider 5/3/22 1126)         DECISION MAKING:  Krystina Genao is a 77 y.o. female who comes in as above. Patient appears well.   [place] lidocaine was injected in the skin I did probe for tick fragments, none are found. Given erythema, will place patient on doxycycline. Doubt this is Lyme disease due to the appearance of the erythematous rash. Patient to follow with PCP and return as      IMPRESSION:  1. Tick bite of right thigh, initial encounter        DISPOSITION:  Discharged      Discharge Medication List as of 5/3/2022 11:50 AM           Follow-up Information     Follow up With Specialties Details Why Contact Info    Carine Luna MD Family Medicine Schedule an appointment as soon as possible for a visit   [address] [phone]              The patient is asked to follow-up with their primary care provider in the next several days. They are to call tomorrow for an appointment. The patient is asked to return promptly for any increased concerns or worsening of symptoms. They can return to this emergency department or any other emergency department.       Procedures

## 2022-08-16 ENCOUNTER — OFFICE VISIT (OUTPATIENT)
Dept: FAMILY MEDICINE CLINIC | Age: 67
End: 2022-08-16
Payer: MEDICARE

## 2022-08-16 VITALS
BODY MASS INDEX: 36.48 KG/M2 | DIASTOLIC BLOOD PRESSURE: 82 MMHG | TEMPERATURE: 97.7 F | WEIGHT: 185.8 LBS | HEIGHT: 60 IN | OXYGEN SATURATION: 97 % | SYSTOLIC BLOOD PRESSURE: 128 MMHG | HEART RATE: 64 BPM | RESPIRATION RATE: 16 BRPM

## 2022-08-16 DIAGNOSIS — Z12.31 VISIT FOR SCREENING MAMMOGRAM: ICD-10-CM

## 2022-08-16 DIAGNOSIS — Z00.00 ENCOUNTER FOR MEDICARE ANNUAL WELLNESS EXAM: Primary | ICD-10-CM

## 2022-08-16 DIAGNOSIS — R30.9 URINATION PAIN: ICD-10-CM

## 2022-08-16 DIAGNOSIS — I10 HTN, GOAL BELOW 150/90: ICD-10-CM

## 2022-08-16 DIAGNOSIS — Z78.0 POSTMENOPAUSAL: ICD-10-CM

## 2022-08-16 DIAGNOSIS — Z13.820 SCREENING FOR OSTEOPOROSIS: ICD-10-CM

## 2022-08-16 DIAGNOSIS — Z01.818 PRE-OP EVALUATION: ICD-10-CM

## 2022-08-16 LAB
BILIRUB UR QL STRIP: NEGATIVE
GLUCOSE UR-MCNC: NEGATIVE MG/DL
KETONES P FAST UR STRIP-MCNC: NEGATIVE MG/DL
PH UR STRIP: 6 [PH] (ref 4.6–8)
PROT UR QL STRIP: NORMAL
SP GR UR STRIP: 1.02 (ref 1–1.03)
UA UROBILINOGEN AMB POC: NORMAL (ref 0.2–1)
URINALYSIS CLARITY POC: NORMAL
URINALYSIS COLOR POC: YELLOW
URINE BLOOD POC: NORMAL
URINE LEUKOCYTES POC: NORMAL
URINE NITRITES POC: POSITIVE

## 2022-08-16 PROCEDURE — 1123F ACP DISCUSS/DSCN MKR DOCD: CPT | Performed by: FAMILY MEDICINE

## 2022-08-16 PROCEDURE — 81001 URINALYSIS AUTO W/SCOPE: CPT | Performed by: FAMILY MEDICINE

## 2022-08-16 PROCEDURE — 99214 OFFICE O/P EST MOD 30 MIN: CPT | Performed by: FAMILY MEDICINE

## 2022-08-16 PROCEDURE — G8417 CALC BMI ABV UP PARAM F/U: HCPCS | Performed by: FAMILY MEDICINE

## 2022-08-16 PROCEDURE — G0439 PPPS, SUBSEQ VISIT: HCPCS | Performed by: FAMILY MEDICINE

## 2022-08-16 PROCEDURE — G8536 NO DOC ELDER MAL SCRN: HCPCS | Performed by: FAMILY MEDICINE

## 2022-08-16 PROCEDURE — 1101F PT FALLS ASSESS-DOCD LE1/YR: CPT | Performed by: FAMILY MEDICINE

## 2022-08-16 PROCEDURE — G9899 SCRN MAM PERF RSLTS DOC: HCPCS | Performed by: FAMILY MEDICINE

## 2022-08-16 PROCEDURE — G0463 HOSPITAL OUTPT CLINIC VISIT: HCPCS | Performed by: FAMILY MEDICINE

## 2022-08-16 PROCEDURE — G8399 PT W/DXA RESULTS DOCUMENT: HCPCS | Performed by: FAMILY MEDICINE

## 2022-08-16 PROCEDURE — G8754 DIAS BP LESS 90: HCPCS | Performed by: FAMILY MEDICINE

## 2022-08-16 PROCEDURE — 3017F COLORECTAL CA SCREEN DOC REV: CPT | Performed by: FAMILY MEDICINE

## 2022-08-16 PROCEDURE — G8427 DOCREV CUR MEDS BY ELIG CLIN: HCPCS | Performed by: FAMILY MEDICINE

## 2022-08-16 PROCEDURE — G8432 DEP SCR NOT DOC, RNG: HCPCS | Performed by: FAMILY MEDICINE

## 2022-08-16 PROCEDURE — 1090F PRES/ABSN URINE INCON ASSESS: CPT | Performed by: FAMILY MEDICINE

## 2022-08-16 PROCEDURE — G8752 SYS BP LESS 140: HCPCS | Performed by: FAMILY MEDICINE

## 2022-08-16 RX ORDER — NITROFURANTOIN 25; 75 MG/1; MG/1
100 CAPSULE ORAL 2 TIMES DAILY
Qty: 10 CAPSULE | Refills: 0 | Status: SHIPPED | OUTPATIENT
Start: 2022-08-16 | End: 2022-08-21

## 2022-08-16 RX ORDER — DICLOFENAC SODIUM 75 MG/1
75 TABLET, DELAYED RELEASE ORAL 2 TIMES DAILY
COMMUNITY
Start: 2022-07-21 | End: 2022-08-20

## 2022-08-16 NOTE — PROGRESS NOTES
Patient Name: Indiana Jones   MRN: [id number]    Fabby Snea is a 77 y.o. female who presents with the following:     Pre Op  Procedure: left total knee replacement  Expected Date: 9/6/22  Surgeon: Dr. Ronal No  Hx of complications with general anesthesia: none  Signs and symptoms of cardiovascular disease:  none  Have any of the following: CVA, CHF, Cr > 2.0, insulin-dependent DM, ischemic cardiac disease, or suprainguinal vascular/intrathroacic/intraabdominal surgery:  none  Able to meet > 4 METS: yes  STOP-BANG (snoring, tiredness, observed apnea, high BP, BMI>35, age >50, neck circumference> 15 in, male): 3+ risk factors - N/A     Reports 2 week hx of burning sensation when she urinates. She has been biking more often. Denies any fever, back pain, or vaginal symptoms. Review of Systems   Genitourinary:  Positive for dysuria. Negative for flank pain, frequency, hematuria and urgency. The patient's medications, allergies, past medical history, surgical history, family history and social history were reviewed and updated where appropriate. Current Outpatient Medications:     diclofenac EC (VOLTAREN) 75 mg EC tablet, Take 75 mg by mouth two (2) times a day., Disp: , Rfl:     valsartan-hydroCHLOROthiazide (DIOVAN-HCT) 320-12.5 mg per tablet, Take 1 Tablet by mouth daily. , Disp: 90 Tablet, Rfl: 1    atorvastatin (LIPITOR) 40 mg tablet, Take 1 Tablet by mouth daily. , Disp: 90 Tablet, Rfl: 1    cholecalciferol (VITAMIN D3) (2,000 UNITS /50 MCG) cap capsule, Take 2,000 Units by mouth daily. , Disp: 90 Cap, Rfl: 1    Cetirizine 10 mg cap, Take 10 mg by mouth daily. , Disp: , Rfl:     GLUCOSAMINE-CONDROITIN-HRB#182 PO, Take 3,000 mg by mouth daily.   , Disp: , Rfl:     No Known Allergies      Past Medical History:   Diagnosis Date    Basal cell carcinoma     Dr Banks Sees toe 04/20/2015    Great toe of L foot     Degenerative arthritis of knee     L  Li/os    DUB (dysfunctional uterine bleeding) 2005    Encounter for screening colonoscopy 12/11/2017    Dr. Guerda Oh - repeat colonoscopy depends on polyp pathology, possibly in 5 years    Fracture     Right ankle fx with surgery; sports related (years ago)    Glucose intolerance (impaired glucose tolerance) 1/9/2015    A1C=6% on 1/9/15     HTN, goal below 150/90     Hypercholesterolemia     heart scan ca 0 2004    Osteopenia 2/11    repeat  DEXA 2/13    Unspecified vitamin D deficiency 6/10       Past Surgical History:   Procedure Laterality Date    COLONOSCOPY  2007    repeat 2017    HX CARPAL TUNNEL RELEASE      HX DILATION AND CURETTAGE Bilateral     HX OTHER SURGICAL      cosmetic, lower eyelid lift       Family History   Problem Relation Age of Onset    Elevated Lipids Mother     Hypertension Mother     Cancer Maternal Grandmother         cervical    Diabetes Maternal Grandmother     Heart Disease Maternal Grandfather         MI    Elevated Lipids Father     Hypertension Father     Diabetes Father         type 2       Social History     Tobacco Use    Smoking status: Never    Smokeless tobacco: Never   Vaping Use    Vaping Use: Never used   Substance Use Topics    Alcohol use: Yes     Alcohol/week: 1.0 standard drink     Types: 1 Glasses of wine per week     Comment: rarely    Drug use: No             OBJECTIVE    Visit Vitals  /82 (BP 1 Location: Left upper arm, BP Patient Position: Sitting, BP Cuff Size: Adult)   Pulse 64   Temp 97.7 °F (36.5 °C) (Temporal)   Resp 16   Ht 5' (1.524 m)   Wt 185 lb 12.8 oz (84.3 kg)   LMP 07/30/2013   SpO2 97%   BMI 36.29 kg/m²       Physical Exam  Vitals and nursing note reviewed. Constitutional:       General: She is not in acute distress. Appearance: She is not diaphoretic. Eyes:      Conjunctiva/sclera: Conjunctivae normal.      Pupils: Pupils are equal, round, and reactive to light. Cardiovascular:      Rate and Rhythm: Normal rate and regular rhythm.       Pulses: Carotid pulses are 2+ on the right side. Heart sounds: Normal heart sounds. No murmur heard. No friction rub. No gallop. Pulmonary:      Effort: Pulmonary effort is normal. No respiratory distress. Breath sounds: Normal breath sounds. No wheezing. Skin:     General: Skin is warm and dry. Neurological:      Mental Status: She is alert. ASSESSMENT AND PLAN  Krystle Bunn is a 77 y.o. female who presents today for:    1. Encounter for Medicare annual wellness exam    2. Pre-op evaluation  Risk of cardiac death and nonfatal myocardial infarction for noncardiac surgical procedures:  1-5% due to intermediate risk. Revised Cardiac Risk Index of a major cardiac event is 0.4%. Patient has no clinical predictor of perioperative cardiac complications. These risk calculators have been reviewed with the patient who expressed understanding of the relative cardiac risk of his/her upcoming procedure given his/her current risk factors. According to the [employer] of Cardiology and AHA Guidelines of perioperative cardiovascular evaluation for noncardiac surgery, patient is cleared for left total knee replacement. (no clinical predictors, intermediate risk procedure, > 4 METS). 3. HTN, goal below 150/90  Stable, continue current treatment. 4. Urination pain  UA suggestive of UTI; will empirically tx and follow up with culture. - AMB POC URINALYSIS DIP STICK AUTO W/ MICRO  - CULTURE, URINE; Future  - nitrofurantoin, macrocrystal-monohydrate, (Macrobid) 100 mg capsule; Take 1 Capsule by mouth two (2) times a day for 5 days. Dispense: 10 Capsule; Refill: 0    5. Screening for osteoporosis  - DEXA BONE DENSITY STUDY AXIAL; Future    6. Postmenopausal  - DEXA BONE DENSITY STUDY AXIAL; Future    7. Visit for screening mammogram  - [facility] 3D [facility] W MAMMO BI SCREENING INCL CAD;  Future      Medications Discontinued During This Encounter   Medication Reason    ibuprofen (MOTRIN) 200 mg tablet LIST CLEANUP    DOCOSAHEXANOIC ACID/EPA (FISH OIL PO) LIST CLEANUP    aspirin 81 mg chewable tablet LIST CLEANUP           Treatment risks/benefits/costs/interactions/alternatives discussed with patient. Advised patient to call back or return to office if symptoms worsen/change/persist. If patient cannot reach us or should anything more severe/urgent arise he/she should proceed directly to the nearest emergency department. Discussed expected course/resolution/complications of diagnosis in detail with patient. Patient expressed understanding with the diagnosis and plan. This dictation may have been completed with Dragon, the [employer] voice recognition software. Unanticipated grammatical, syntax, homophones, and other interpretive errors are sometimes inadvertently transcribed by the computer software. Please disregard any errors that have escaped final proofreading. Jay Smith M.D.

## 2022-08-16 NOTE — PROGRESS NOTES
Chief Complaint   Patient presents with    Annual Wellness Visit    Pre-op Exam     TKA left        1. Have you been to the ER, urgent care clinic since your last visit? Hospitalized since your last visit? Yes When: 07/22 Where: ortho on call Reason for visit: upper back pain; degenerative disk disease causing a pinched nerve     2. Have you seen or consulted any other health care providers outside of the [address] [place] [address] [place] since your last visit? Include any pap smears or colon screening. Yes When: 07/22 Where: [facility] Reason for visit: knee pain    3. For patients over 45: Has the patient had a colonoscopy? Yes - no Care Gap present     If the patient is female:    4. For patients over 40: Has the patient had a mammogram? Yes - no Care Gap present    5. For patients over 21: Has the patient had a pap smear? NA - based on age    1 most recent PHQ Screens 2/2/2022   Little interest or pleasure in doing things Not at all   Feeling down, depressed, irritable, or hopeless Not at all   Total Score PHQ 2 0       Fall Risk Assessment, last 12 mths 2/2/2022   Able to walk? Yes   Fall in past 12 months? 0   Do you feel unsteady?  0   Are you worried about falling 0       ADL Assessment 2/2/2022   Feeding yourself No Help Needed   Getting from bed to chair No Help Needed   Getting dressed No Help Needed   Bathing or showering No Help Needed   Walk across the room (includes cane/walker) No Help Needed   Using the telphone No Help Needed   Taking your medications No Help Needed   Preparing meals No Help Needed   Managing money (expenses/bills) No Help Needed   Moderately strenuous housework (laundry) No Help Needed   Shopping for personal items (toiletries/medicines) No Help Needed   Shopping for groceries No Help Needed   Driving No Help Needed   Climbing a flight of stairs No Help Needed   Getting to places beyond walking distances No Help Needed       Abuse Screening Questionnaire 2/2/2022   Do you ever feel afraid of your partner? N   Are you in a relationship with someone who physically or mentally threatens you? N   Is it safe for you to go home?  Familia Conteh

## 2022-08-16 NOTE — PROGRESS NOTES
This is the Subsequent Medicare Annual Wellness Exam, performed 12 months or more after the Initial AWV or the last Subsequent AWV    I have reviewed the patient's medical history in detail and updated the computerized patient record. Assessment/Plan   Education and counseling provided:  Are appropriate based on today's review and evaluation    1. Encounter for Medicare annual wellness exam  2. Pre-op evaluation  3. HTN, goal below 150/90  4. Urination pain  -     AMB POC URINALYSIS DIP STICK AUTO W/ MICRO  -     CULTURE, URINE; Future  -     nitrofurantoin, macrocrystal-monohydrate, (Macrobid) 100 mg capsule; Take 1 Capsule by mouth two (2) times a day for 5 days. , Normal, Disp-10 Capsule, R-0  5. Screening for osteoporosis  -     DEXA BONE DENSITY STUDY AXIAL; Future  6. Postmenopausal  -     DEXA BONE DENSITY STUDY AXIAL; Future  7. Visit for screening mammogram  -     [facility] 3D [facility] W MAMMO BI SCREENING INCL CAD; Future       Depression Risk Factor Screening     3 most recent PHQ Screens 2/2/2022   Little interest or pleasure in doing things Not at all   Feeling down, depressed, irritable, or hopeless Not at all   Total Score PHQ 2 0       Alcohol & Drug Abuse Risk Screen    Do you average more than 1 drink per night or more than 7 drinks a week:  No    On any one occasion in the past three months have you have had more than 3 drinks containing alcohol:  No          Functional Ability and Level of Safety    Hearing: Hearing is good. Activities of Daily Living: The home contains: no safety equipment. Patient does total self care      Ambulation: with no difficulty     Fall Risk:  Fall Risk Assessment, last 12 mths 2/2/2022   Able to walk? Yes   Fall in past 12 months? 0   Do you feel unsteady?  0   Are you worried about falling 0      Abuse Screen:  Patient is not abused       Cognitive Screening    Has your family/caregiver stated any concerns about your memory: no       Health Maintenance Due Health Maintenance Due   Topic Date Due    Shingrix Vaccine Age 49> (1 of 2) Never done    Bone Densitometry  09/12/2020    Pneumococcal 65+ years (1 - PCV) Never done       Patient Care Team   Patient Care Team:  Laila Sahu MD as PCP - General (Family Medicine)  Laila Sahu MD as PCP [facility]  Wale Howard MD (Gastroenterology)  Velia Wheat DO (Obstetrics & Gynecology)    History     Patient Active Problem List   Diagnosis Code    Hypercholesterolemia E78.00    HTN, goal below 150/90 I10    Basal cell carcinoma C44.91    Glucose intolerance (impaired glucose tolerance) R73.02    Vitamin D deficiency E55.9    Osteopenia M85.80    Degenerative arthritis of knee M17.10    Broken toe S92.919A    Severe obesity (BMI 35.0-39. 9) with comorbidity ([address] E66.01     Past Medical History:   Diagnosis Date    Basal cell carcinoma     Dr Alisa Martell toe 04/20/2015    Great toe of L foot     Degenerative arthritis of knee     L  Li/os    DUB (dysfunctional uterine bleeding) 2005    Encounter for screening colonoscopy 12/11/2017    Dr. Chely Lyon - repeat colonoscopy depends on polyp pathology, possibly in 5 years    Fracture     Right ankle fx with surgery; sports related (years ago)    Glucose intolerance (impaired glucose tolerance) 1/9/2015    A1C=6% on 1/9/15     HTN, goal below 150/90     Hypercholesterolemia     heart scan ca 0 2004    Osteopenia 2/11    repeat  DEXA 2/13    Unspecified vitamin D deficiency 6/10      Past Surgical History:   Procedure Laterality Date    COLONOSCOPY  2007    repeat 2017    HX CARPAL TUNNEL RELEASE      HX DILATION AND CURETTAGE Bilateral     HX OTHER SURGICAL      cosmetic, lower eyelid lift     Current Outpatient Medications   Medication Sig Dispense Refill    diclofenac EC (VOLTAREN) 75 mg EC tablet Take 75 mg by mouth two (2) times a day.       nitrofurantoin, macrocrystal-monohydrate, (Macrobid) 100 mg capsule Take 1 Capsule by mouth two (2) times a day for 5 days. 10 Capsule 0    valsartan-hydroCHLOROthiazide (DIOVAN-HCT) 320-12.5 mg per tablet Take 1 Tablet by mouth daily. 90 Tablet 1    atorvastatin (LIPITOR) 40 mg tablet Take 1 Tablet by mouth daily. 90 Tablet 1    cholecalciferol (VITAMIN D3) (2,000 UNITS /50 MCG) cap capsule Take 2,000 Units by mouth daily. 90 Cap 1    Cetirizine 10 mg cap Take 10 mg by mouth daily. GLUCOSAMINE-CONDROITIN-HRB#182 PO Take 3,000 mg by mouth daily. No Known Allergies    Family History   Problem Relation Age of Onset    Elevated Lipids Mother     Hypertension Mother     Cancer Maternal Grandmother         cervical    Diabetes Maternal Grandmother     Heart Disease Maternal Grandfather         MI    Elevated Lipids Father     Hypertension Father     Diabetes Father         type 2     Social History     Tobacco Use    Smoking status: Never    Smokeless tobacco: Never   Substance Use Topics    Alcohol use:  Yes     Alcohol/week: 1.0 standard drink     Types: 1 Glasses of wine per week     Comment: rarely         Cherise Cope MD

## 2022-08-19 LAB
BACTERIA SPEC CULT: ABNORMAL
CC UR VC: ABNORMAL
SERVICE CMNT-IMP: ABNORMAL

## 2022-11-02 ENCOUNTER — HOSPITAL ENCOUNTER (OUTPATIENT)
Dept: MAMMOGRAPHY | Age: 67
Discharge: HOME | End: 2022-11-02
Attending: FAMILY MEDICINE
Payer: MEDICARE

## 2022-11-02 DIAGNOSIS — Z78.0 POSTMENOPAUSAL: ICD-10-CM

## 2022-11-02 DIAGNOSIS — Z12.31 VISIT FOR SCREENING MAMMOGRAM: ICD-10-CM

## 2022-11-02 DIAGNOSIS — Z13.820 SCREENING FOR OSTEOPOROSIS: ICD-10-CM

## 2022-11-02 PROCEDURE — 77063 BREAST TOMOSYNTHESIS BI: CPT

## 2022-11-02 PROCEDURE — 77080 DXA BONE DENSITY AXIAL: CPT

## 2022-11-23 ENCOUNTER — PATIENT MESSAGE (OUTPATIENT)
Dept: FAMILY MEDICINE CLINIC | Age: 67
End: 2022-11-23

## 2022-11-23 DIAGNOSIS — E78.5 HYPERLIPIDEMIA, UNSPECIFIED HYPERLIPIDEMIA TYPE: ICD-10-CM

## 2022-11-23 RX ORDER — ATORVASTATIN CALCIUM 40 MG/1
40 TABLET, FILM COATED ORAL DAILY
Qty: 90 TABLET | Refills: 3 | Status: SHIPPED | OUTPATIENT
Start: 2022-11-23

## 2022-12-16 ENCOUNTER — TRANSCRIBE ORDER (OUTPATIENT)
Dept: SCHEDULING | Age: 67
End: 2022-12-16

## 2022-12-16 DIAGNOSIS — M54.12 CERVICAL RADICULOPATHY: Primary | ICD-10-CM

## 2022-12-23 ENCOUNTER — HOSPITAL ENCOUNTER (OUTPATIENT)
Dept: MRI IMAGING | Age: 67
End: 2022-12-23
Attending: STUDENT IN AN ORGANIZED HEALTH CARE EDUCATION/TRAINING PROGRAM
Payer: MEDICARE

## 2022-12-23 DIAGNOSIS — M54.12 CERVICAL RADICULOPATHY: ICD-10-CM

## 2022-12-23 PROCEDURE — 72141 MRI NECK SPINE W/O DYE: CPT

## 2023-01-05 ENCOUNTER — PATIENT MESSAGE (OUTPATIENT)
Dept: FAMILY MEDICINE CLINIC | Age: 68
End: 2023-01-05

## 2023-01-10 ENCOUNTER — VIRTUAL VISIT (OUTPATIENT)
Dept: FAMILY MEDICINE CLINIC | Age: 68
End: 2023-01-10
Payer: MEDICARE

## 2023-01-10 DIAGNOSIS — E04.1 THYROID NODULE: Primary | ICD-10-CM

## 2023-01-10 PROCEDURE — 1101F PT FALLS ASSESS-DOCD LE1/YR: CPT | Performed by: FAMILY MEDICINE

## 2023-01-10 PROCEDURE — G0463 HOSPITAL OUTPT CLINIC VISIT: HCPCS | Performed by: FAMILY MEDICINE

## 2023-01-10 PROCEDURE — G8427 DOCREV CUR MEDS BY ELIG CLIN: HCPCS | Performed by: FAMILY MEDICINE

## 2023-01-10 PROCEDURE — 1123F ACP DISCUSS/DSCN MKR DOCD: CPT | Performed by: FAMILY MEDICINE

## 2023-01-10 PROCEDURE — G8399 PT W/DXA RESULTS DOCUMENT: HCPCS | Performed by: FAMILY MEDICINE

## 2023-01-10 PROCEDURE — G9899 SCRN MAM PERF RSLTS DOC: HCPCS | Performed by: FAMILY MEDICINE

## 2023-01-10 PROCEDURE — 99213 OFFICE O/P EST LOW 20 MIN: CPT | Performed by: FAMILY MEDICINE

## 2023-01-10 PROCEDURE — G8432 DEP SCR NOT DOC, RNG: HCPCS | Performed by: FAMILY MEDICINE

## 2023-01-10 PROCEDURE — 1090F PRES/ABSN URINE INCON ASSESS: CPT | Performed by: FAMILY MEDICINE

## 2023-01-10 PROCEDURE — 3017F COLORECTAL CA SCREEN DOC REV: CPT | Performed by: FAMILY MEDICINE

## 2023-01-10 RX ORDER — GABAPENTIN 100 MG/1
CAPSULE ORAL
COMMUNITY
Start: 2022-12-13

## 2023-01-10 NOTE — PROGRESS NOTES
Joceline Burk, was evaluated through a synchronous (real-time) audio-video encounter. The patient (or guardian if applicable) is aware that this is a billable service, which includes applicable co-pays. This Virtual Visit was conducted with patient's (and/or legal guardian's) consent. The visit was conducted pursuant to the emergency declaration under the [facility] [address] authority and the [employer] Act. Patient identification was verified, and a caregiver was present when appropriate. The patient was located at: Home: [address] [place] [id number]  The provider was located at: Facility (Appt Department): [address] [place] [address]       Marcos Cruz MD    Subjective:   Joceline Burk is a 79 y.o. F who was seen for:    Recently had a cervical spine MRI done for chronic neck pain. Incidental thyroid nodule was noted. No known prior thyroid issues or symptoms. Otherwise doing well. Her daughter just donated one of her kidneys to pt's  and everyone is doing well. MRI Results (most recent):  Results from [place] encounter on 12/23/22    MRI CERV SPINE WO CONT    Narrative  EXAM: MRI CERV SPINE WO CONT    INDICATION: neck pain, chronic. Radiculopathy, cervical region    COMPARISON: None    TECHNIQUE: MR imaging of the cervical spine was performed using the following  sequences: sagittal T1, T2, STIR;  axial T2, T1.    CONTRAST:  None. FINDINGS:    Stepwise anterolisthesis from C2-C4. Stepwise retrolisthesis from C4-C6. Vertebral body heights are maintained. Multilevel degenerative endplate  osteophytes. Patchy degenerative endplate marrow edema signal throughout the  cervical spine, most pronounced at C6-C7. The craniocervical junction is intact.  The course, caliber, and signal intensity  of the spinal cord are normal.    2.8 cm left thyroid nodule (6-45). C2-C3: Anterolisthesis. Facet arthropathy. No spinal stenosis. Mild left  foraminal stenosis. C3-C4: Anterolisthesis. Facet arthropathy. Uncovertebral hypertrophy. No spinal  stenosis. Mild bilateral foraminal stenosis. C4-C5: Retrolisthesis. Disc bulge. Endplate osteophytes. Facet arthropathy. Uncovertebral hypertrophy. Flattening of the ventral thecal sac, without overt  spinal canal stenosis. Severe left and moderate right foraminal stenosis. C5-C6: Retrolisthesis. Disc bulge. Endplate osteophytes. Facet arthropathy. Uncovertebral hypertrophy. Ligament of flavum thickening. Mild spinal stenosis. Severe left and moderate right foraminal stenosis. C6-C7: Disc bulge. Endplate osteophytes. Facet arthropathy. Uncovertebral  hypertrophy. No spinal stenosis. Moderate right and mild left foraminal  stenosis. C7-T1: Facet arthropathy, right worse and left. No spinal stenosis. Moderate  right foraminal stenosis. T1-T2: Facet arthropathy. No spinal stenosis. Mild bilateral foraminal stenosis. Impression  1. Multilevel degenerative changes, disc disease, and listhesis. C5-C6 mild  spinal canal stenosis. Multilevel neural foraminal stenosis, most severe from  C4-C6 on the LEFT. 2.  Incidental 2.8 cm left thyroid nodule. Nonemergent thyroid ultrasound can be  obtained for further characterization. 23X        Current Outpatient Medications:     atorvastatin (LIPITOR) 40 mg tablet, Take 1 Tablet by mouth daily. , Disp: 90 Tablet, Rfl: 3    valsartan-hydroCHLOROthiazide (DIOVAN-HCT) 320-12.5 mg per tablet, Take 1 tablet by mouth once daily, Disp: 90 Tablet, Rfl: 2    cholecalciferol (VITAMIN D3) (2,000 UNITS /50 MCG) cap capsule, Take 2,000 Units by mouth daily. , Disp: 90 Cap, Rfl: 1    Cetirizine 10 mg cap, Take 10 mg by mouth daily. , Disp: , Rfl:     GLUCOSAMINE-CONDROITIN-HRB#182 PO, Take 3,000 mg by mouth daily.   , Disp: , Rfl:     No Known Allergies    Review of Systems Constitutional:  Negative for fever, malaise/fatigue and weight loss. Respiratory:  Negative for cough, hemoptysis, shortness of breath and wheezing. Cardiovascular:  Negative for chest pain, palpitations, leg swelling and PND. Gastrointestinal:  Negative for abdominal pain, constipation, diarrhea, nausea and vomiting. Objective:   No flowsheet data found. Constitutional: [x] Appears well-developed and well-nourished [x] No apparent distress      [] Abnormal -     Mental status: [x] Alert and awake  [x] Oriented to person/place/time [x] Able to follow commands    [] Abnormal -     Eyes:   EOM    [x]  Normal    [] Abnormal -   Sclera  [x]  Normal    [] Abnormal -          Discharge []  None visible   [] Abnormal -     HENT: [x] Normocephalic, atraumatic  [] Abnormal -   [] Mouth/Throat: Mucous membranes are moist    Neck: [x] No visualized mass [] Abnormal -     Pulmonary/Chest: [x] Respiratory effort normal   [x] No visualized signs of difficulty breathing or respiratory distress        [] Abnormal -         Skin:        [x] No significant exanthematous lesions or discoloration noted on facial skin         [] Abnormal -            Psychiatric:       [x] Normal Affect [] Abnormal -        [x] No Hallucinations    Other pertinent observable physical exam findings:    Assessment & Plan:   Tasha Sosa is a 79 y.o. female who presents today for:    1. Thyroid nodule  Will assess further with US.  - US THYROID/PARATHYROID/SOFT TISS; Future       There are no discontinued medications. Treatment risks/benefits/costs/interactions/alternatives discussed with patient. Advised patient to call back or return to office if symptoms worsen/change/persist. If patient cannot reach us or should anything more severe/urgent arise he/she should proceed directly to the nearest emergency department. Discussed expected course/resolution/complications of diagnosis in detail with patient.   Patient expressed understanding with the diagnosis and plan. This dictation may have been completed with Dragon, the computer voice recognition software. Unanticipated grammatical, syntax, homophones, and other interpretive errors are sometimes inadvertently transcribed by the computer software. Please disregard any errors that have escaped final proofreading. Jay Boyle M.D.

## 2023-01-23 ENCOUNTER — HOSPITAL ENCOUNTER (OUTPATIENT)
Dept: ULTRASOUND IMAGING | Age: 68
Discharge: HOME | End: 2023-01-23
Attending: FAMILY MEDICINE
Payer: MEDICARE

## 2023-01-23 DIAGNOSIS — E04.1 THYROID NODULE: ICD-10-CM

## 2023-01-23 PROCEDURE — 76536 US EXAM OF HEAD AND NECK: CPT

## 2023-01-24 DIAGNOSIS — E04.1 THYROID NODULE: Primary | ICD-10-CM

## 2023-01-24 NOTE — PROGRESS NOTES
Please call patient:    Thyroid ultrasound does show a nodule in the left lobe; I recommend that she see Dr. Miya Chang with ENT to discuss if further evaluation needs to be done such as a biopsy. Referral placed.

## 2023-01-24 NOTE — PROGRESS NOTES
Called patient back and let  her know  answer, she was not satisfy with answer I offer her a virtual so she can speak to provider about it but denied.

## 2023-01-24 NOTE — PROGRESS NOTES
Please call patient:    Depending on what the ENT doctor recommends, a biopsy might be recommended to make sure the nodule is benign or if there is something more concerning such as malignancy. I will defer to ENT if they feel like a biopsy is needed based on how it looks on the ultrasound or if they are okay with just monitoring it.

## 2023-03-01 ENCOUNTER — HOSPITAL ENCOUNTER (OUTPATIENT)
Dept: ULTRASOUND IMAGING | Age: 68
Discharge: HOME | End: 2023-03-01
Attending: OTOLARYNGOLOGY
Payer: MEDICARE

## 2023-03-01 DIAGNOSIS — E04.1 THYROID NODULE: ICD-10-CM

## 2023-03-01 PROCEDURE — 74011000250 HC RX REV CODE- 250: Performed by: PHYSICIAN ASSISTANT

## 2023-03-01 PROCEDURE — 77030014115 US GUIDE FINE NDL ASP W IMAGE

## 2023-03-01 PROCEDURE — 88173 CYTOPATH EVAL FNA REPORT: CPT

## 2023-03-01 PROCEDURE — 88172 CYTP DX EVAL FNA 1ST EA SITE: CPT

## 2023-03-01 RX ORDER — LIDOCAINE HYDROCHLORIDE 10 MG/ML
10 INJECTION, SOLUTION EPIDURAL; INFILTRATION; INTRACAUDAL; PERINEURAL
Status: COMPLETED | OUTPATIENT
Start: 2023-03-01 | End: 2023-03-01

## 2023-03-01 RX ADMIN — LIDOCAINE HYDROCHLORIDE 10 ML: 10 INJECTION, SOLUTION EPIDURAL; INFILTRATION; INTRACAUDAL; PERINEURAL at 15:00

## 2023-08-14 RX ORDER — VALSARTAN AND HYDROCHLOROTHIAZIDE 320; 12.5 MG/1; MG/1
TABLET, FILM COATED ORAL
Qty: 90 TABLET | Refills: 1 | Status: SHIPPED | OUTPATIENT
Start: 2023-08-14

## 2023-08-14 SDOH — ECONOMIC STABILITY: TRANSPORTATION INSECURITY
IN THE PAST 12 MONTHS, HAS LACK OF TRANSPORTATION KEPT YOU FROM MEETINGS, WORK, OR FROM GETTING THINGS NEEDED FOR DAILY LIVING?: NO

## 2023-08-14 SDOH — ECONOMIC STABILITY: FOOD INSECURITY: WITHIN THE PAST 12 MONTHS, YOU WORRIED THAT YOUR FOOD WOULD RUN OUT BEFORE YOU GOT MONEY TO BUY MORE.: NEVER TRUE

## 2023-08-14 SDOH — ECONOMIC STABILITY: FOOD INSECURITY: WITHIN THE PAST 12 MONTHS, THE FOOD YOU BOUGHT JUST DIDN'T LAST AND YOU DIDN'T HAVE MONEY TO GET MORE.: NEVER TRUE

## 2023-08-14 SDOH — ECONOMIC STABILITY: INCOME INSECURITY: HOW HARD IS IT FOR YOU TO PAY FOR THE VERY BASICS LIKE FOOD, HOUSING, MEDICAL CARE, AND HEATING?: NOT HARD AT ALL

## 2023-08-14 SDOH — ECONOMIC STABILITY: HOUSING INSECURITY
IN THE LAST 12 MONTHS, WAS THERE A TIME WHEN YOU DID NOT HAVE A STEADY PLACE TO SLEEP OR SLEPT IN A SHELTER (INCLUDING NOW)?: NO

## 2023-08-14 SDOH — HEALTH STABILITY: PHYSICAL HEALTH: ON AVERAGE, HOW MANY MINUTES DO YOU ENGAGE IN EXERCISE AT THIS LEVEL?: 50 MIN

## 2023-08-14 SDOH — HEALTH STABILITY: PHYSICAL HEALTH: ON AVERAGE, HOW MANY DAYS PER WEEK DO YOU ENGAGE IN MODERATE TO STRENUOUS EXERCISE (LIKE A BRISK WALK)?: 3 DAYS

## 2023-08-14 ASSESSMENT — PATIENT HEALTH QUESTIONNAIRE - PHQ9
2. FEELING DOWN, DEPRESSED OR HOPELESS: 0
SUM OF ALL RESPONSES TO PHQ QUESTIONS 1-9: 0
SUM OF ALL RESPONSES TO PHQ9 QUESTIONS 1 & 2: 0
1. LITTLE INTEREST OR PLEASURE IN DOING THINGS: 0

## 2023-08-14 ASSESSMENT — LIFESTYLE VARIABLES
HOW OFTEN DO YOU HAVE A DRINK CONTAINING ALCOHOL: 3
HOW OFTEN DO YOU HAVE SIX OR MORE DRINKS ON ONE OCCASION: 1
HOW OFTEN DO YOU HAVE A DRINK CONTAINING ALCOHOL: 2-4 TIMES A MONTH
HOW MANY STANDARD DRINKS CONTAINING ALCOHOL DO YOU HAVE ON A TYPICAL DAY: 1 OR 2
HOW MANY STANDARD DRINKS CONTAINING ALCOHOL DO YOU HAVE ON A TYPICAL DAY: 1

## 2023-08-14 NOTE — TELEPHONE ENCOUNTER
PCP: Claudell Gamma, MD    Last appt: 1/10/2023       Future Appointments   Date Time Provider [address]   8/14/2023  1:30 PM LAB ONLY [facility]   8/17/2023  1:15 PM Chuy Nicholson MD [facility]       Requested Prescriptions     Pending Prescriptions Disp Refills    valsartan-hydroCHLOROthiazide (DIOVAN-HCT) 320-12.5 MG per tablet [Pharmacy Med Name: Valsartan-hydroCHLOROthiazide 320-12.5 MG Oral Tablet] 90 tablet 0     Sig: Take 1 tablet by mouth once daily       Prior labs and Blood pressures:  BP Readings from Last 3 Encounters:   08/16/22 128/82   02/02/22 108/71     Lab Results   Component Value Date/Time     02/02/2022 10:00 AM    K 4.3 02/02/2022 10:00 AM     02/02/2022 10:00 AM    CO2 29 02/02/2022 10:00 AM    BUN 18 02/02/2022 10:00 AM    GFRAA >60 02/02/2022 10:00 AM     No results found for: HBA1C, YIE1BZGN  Lab Results   Component Value Date/Time    CHOL 144 02/02/2022 10:00 AM    HDL 42 02/02/2022 10:00 AM     No results found for: VITD3, VD3RIA    No results found for: TSH, TSH2, TSH3

## 2023-08-17 ENCOUNTER — OFFICE VISIT (OUTPATIENT)
Age: 68
End: 2023-08-17
Payer: MEDICARE

## 2023-08-17 VITALS
HEIGHT: 60 IN | BODY MASS INDEX: 37.03 KG/M2 | RESPIRATION RATE: 14 BRPM | DIASTOLIC BLOOD PRESSURE: 72 MMHG | HEART RATE: 71 BPM | WEIGHT: 188.6 LBS | SYSTOLIC BLOOD PRESSURE: 124 MMHG | TEMPERATURE: 97.2 F | OXYGEN SATURATION: 97 %

## 2023-08-17 DIAGNOSIS — E78.5 HYPERLIPIDEMIA, UNSPECIFIED HYPERLIPIDEMIA TYPE: ICD-10-CM

## 2023-08-17 DIAGNOSIS — Z01.818 PRE-OP EVALUATION: ICD-10-CM

## 2023-08-17 DIAGNOSIS — I10 ESSENTIAL (PRIMARY) HYPERTENSION: ICD-10-CM

## 2023-08-17 DIAGNOSIS — R06.83 SNORING: ICD-10-CM

## 2023-08-17 DIAGNOSIS — Z00.00 ENCOUNTER FOR MEDICARE ANNUAL WELLNESS EXAM: Primary | ICD-10-CM

## 2023-08-17 DIAGNOSIS — E66.01 SEVERE OBESITY (BMI 35.0-39.9) WITH COMORBIDITY (HCC): ICD-10-CM

## 2023-08-17 DIAGNOSIS — R73.02 IMPAIRED GLUCOSE TOLERANCE (ORAL): ICD-10-CM

## 2023-08-17 PROCEDURE — 93005 ELECTROCARDIOGRAM TRACING: CPT | Performed by: FAMILY MEDICINE

## 2023-08-17 PROCEDURE — 3078F DIAST BP <80 MM HG: CPT | Performed by: FAMILY MEDICINE

## 2023-08-17 PROCEDURE — 93010 ELECTROCARDIOGRAM REPORT: CPT | Performed by: FAMILY MEDICINE

## 2023-08-17 PROCEDURE — G8427 DOCREV CUR MEDS BY ELIG CLIN: HCPCS | Performed by: FAMILY MEDICINE

## 2023-08-17 PROCEDURE — 1090F PRES/ABSN URINE INCON ASSESS: CPT | Performed by: FAMILY MEDICINE

## 2023-08-17 PROCEDURE — 3074F SYST BP LT 130 MM HG: CPT | Performed by: FAMILY MEDICINE

## 2023-08-17 PROCEDURE — G0439 PPPS, SUBSEQ VISIT: HCPCS | Performed by: FAMILY MEDICINE

## 2023-08-17 PROCEDURE — G8399 PT W/DXA RESULTS DOCUMENT: HCPCS | Performed by: FAMILY MEDICINE

## 2023-08-17 PROCEDURE — 99214 OFFICE O/P EST MOD 30 MIN: CPT | Performed by: FAMILY MEDICINE

## 2023-08-17 PROCEDURE — 1123F ACP DISCUSS/DSCN MKR DOCD: CPT | Performed by: FAMILY MEDICINE

## 2023-08-17 PROCEDURE — 4004F PT TOBACCO SCREEN RCVD TLK: CPT | Performed by: FAMILY MEDICINE

## 2023-08-17 PROCEDURE — G8417 CALC BMI ABV UP PARAM F/U: HCPCS | Performed by: FAMILY MEDICINE

## 2023-08-17 PROCEDURE — 3017F COLORECTAL CA SCREEN DOC REV: CPT | Performed by: FAMILY MEDICINE

## 2023-08-17 RX ORDER — DICLOFENAC SODIUM 75 MG/1
75 TABLET, DELAYED RELEASE ORAL 2 TIMES DAILY
COMMUNITY
Start: 2023-08-05

## 2023-08-17 RX ORDER — CHOLECALCIFEROL (VITAMIN D3) 125 MCG
CAPSULE ORAL
COMMUNITY
Start: 2023-02-01

## 2023-08-17 ASSESSMENT — ENCOUNTER SYMPTOMS
WHEEZING: 0
ABDOMINAL PAIN: 0
DIARRHEA: 0
SHORTNESS OF BREATH: 0
VOMITING: 0
NAUSEA: 0
CHEST TIGHTNESS: 0
CONSTIPATION: 0
COUGH: 0

## 2023-10-16 ENCOUNTER — HOSPITAL ENCOUNTER (OUTPATIENT)
Facility: HOSPITAL | Age: 68
Setting detail: SPECIMEN
Discharge: HOME | End: 2023-10-19
Payer: MEDICARE

## 2023-10-16 ENCOUNTER — OFFICE VISIT (OUTPATIENT)
Age: 68
End: 2023-10-16
Payer: MEDICARE

## 2023-10-16 VITALS
TEMPERATURE: 97.3 F | RESPIRATION RATE: 14 BRPM | OXYGEN SATURATION: 98 % | HEART RATE: 73 BPM | DIASTOLIC BLOOD PRESSURE: 78 MMHG | BODY MASS INDEX: 36.87 KG/M2 | SYSTOLIC BLOOD PRESSURE: 116 MMHG | HEIGHT: 60 IN | WEIGHT: 187.8 LBS

## 2023-10-16 DIAGNOSIS — Z12.4 CERVICAL CANCER SCREENING: Primary | ICD-10-CM

## 2023-10-16 PROCEDURE — 87624 HPV HI-RISK TYP POOLED RSLT: CPT

## 2023-10-16 PROCEDURE — 88175 CYTOPATH C/V AUTO FLUID REDO: CPT

## 2023-10-16 RX ORDER — LEVOFLOXACIN 750 MG/1
TABLET ORAL
COMMUNITY
Start: 2023-09-23 | End: 2023-10-16

## 2023-10-16 ASSESSMENT — ENCOUNTER SYMPTOMS
CHEST TIGHTNESS: 0
CONSTIPATION: 0
NAUSEA: 0
ABDOMINAL PAIN: 0
COUGH: 0
VOMITING: 0
WHEEZING: 0
SHORTNESS OF BREATH: 0
DIARRHEA: 0

## 2023-10-16 NOTE — PROGRESS NOTES
Patient Name: Radha Dickinson   MRN: [id number]    Terry Marsh is a 76 y.o. female who presents with the following:     Here for Pap smear for cervical cancer screening as her last 1 was done over 5 years ago. No concerns. Review of Systems   Constitutional:  Negative for activity change, appetite change, fatigue, fever and unexpected weight change. Respiratory:  Negative for cough, chest tightness, shortness of breath and wheezing. Cardiovascular:  Negative for chest pain, palpitations and leg swelling. Gastrointestinal:  Negative for abdominal pain, constipation, diarrhea, nausea and vomiting. Genitourinary:  Negative for dysuria, frequency and urgency. Skin:  Negative for rash. Neurological:  Negative for dizziness, weakness and headaches. Psychiatric/Behavioral:  Negative for dysphoric mood and suicidal ideas. The patient is not nervous/anxious. All other systems reviewed and are negative. The patient's medications, allergies, past medical history, surgical history, family history and social history were reviewed and updated where appropriate. Current Outpatient Medications on File Prior to Visit   Medication Sig Dispense Refill    diclofenac (VOLTAREN) 75 MG EC tablet Take 1 tablet by mouth 2 times daily      melatonin 5 MG TABS tablet       valsartan-hydroCHLOROthiazide (DIOVAN-HCT) 320-12.5 MG per tablet Take 1 tablet by mouth once daily 90 tablet 1    atorvastatin (LIPITOR) 40 MG tablet Take by mouth daily      Cetirizine HCl 10 MG CAPS Take by mouth daily      Cholecalciferol 50 MCG (2000 UT) CAPS Take by mouth daily      gabapentin (NEURONTIN) 100 MG capsule START WITH 1 CAPSULE AT BEDTIME FOR 3 DAYS; THEN 1 CAP AT BREAKFAST AND BEDTIME FOR 3 DAYS; THEN TAKE 1 CAP AT BREAKFAST, LUNCH AND BEDTIME THEREAFTER       No current facility-administered medications on file prior to visit.        Allergies   Allergen Reactions    Amoxicillin-Pot Clavulanate Other (See

## 2023-11-08 RX ORDER — ATORVASTATIN CALCIUM 40 MG/1
40 TABLET, FILM COATED ORAL DAILY
Qty: 90 TABLET | Refills: 3 | Status: SHIPPED | OUTPATIENT
Start: 2023-11-08

## 2023-11-08 NOTE — TELEPHONE ENCOUNTER
PCP: Debbie Oneil MD    Last appt: 10/16/2023       Future Appointments   Date Time Provider Department Center   8/19/2024  9:15 AM Debbie Oneil MD [facility]       Requested Prescriptions     Pending Prescriptions Disp Refills    atorvastatin (LIPITOR) 40 MG tablet [Pharmacy Med Name: Atorvastatin Calcium 40 MG Oral Tablet] 90 tablet 0     Sig: Take 1 tablet by mouth once daily       Prior labs and Blood pressures:  BP Readings from Last 3 Encounters:   10/16/23 116/78   08/17/23 124/72   08/16/22 128/82     Lab Results   Component Value Date/Time     08/14/2023 01:58 PM    K 3.4 08/14/2023 01:58 PM     08/14/2023 01:58 PM    CO2 27 08/14/2023 01:58 PM    BUN 21 08/14/2023 01:58 PM    GFRAA >60 02/02/2022 10:00 AM     No results found for: \"HBA1C\", \"[id number]\"  Lab Results   Component Value Date/Time    CHOL 162 08/14/2023 01:58 PM    HDL 40 08/14/2023 01:58 PM     No results found for: \"VITD3\", \"VD3RIA\"    No results found for: \"TSH\", \"TSH2\", \"TSH3\"

## 2024-02-07 RX ORDER — VALSARTAN AND HYDROCHLOROTHIAZIDE 320; 12.5 MG/1; MG/1
TABLET, FILM COATED ORAL
Qty: 90 TABLET | Refills: 2 | Status: SHIPPED | OUTPATIENT
Start: 2024-02-07

## 2024-02-07 NOTE — TELEPHONE ENCOUNTER
PCP: Debbie Oneil MD    Last appt: 10/16/2023       Future Appointments   Date Time Provider Department Center   8/19/2024  9:15 AM Debbie Oneil MD [facility]       Requested Prescriptions     Pending Prescriptions Disp Refills    valsartan-hydroCHLOROthiazide (DIOVAN-HCT) 320-12.5 MG per tablet [Pharmacy Med Name: Valsartan-hydroCHLOROthiazide 320-12.5 MG Oral Tablet] 90 tablet 0     Sig: Take 1 tablet by mouth once daily       Prior labs and Blood pressures:  BP Readings from Last 3 Encounters:   10/16/23 116/78   08/17/23 124/72   08/16/22 128/82     Lab Results   Component Value Date/Time     08/14/2023 01:58 PM    K 3.4 08/14/2023 01:58 PM     08/14/2023 01:58 PM    CO2 27 08/14/2023 01:58 PM    BUN 21 08/14/2023 01:58 PM    GFRAA >60 02/02/2022 10:00 AM     No results found for: \"HBA1C\", \"[id number]\"  Lab Results   Component Value Date/Time    CHOL 162 08/14/2023 01:58 PM    HDL 40 08/14/2023 01:58 PM     No results found for: \"VITD3\", \"VD3RIA\"    No results found for: \"TSH\", \"TSH2\", \"TSH3\"

## 2024-08-05 ENCOUNTER — HOSPITAL ENCOUNTER (EMERGENCY)
Facility: HOSPITAL | Age: 69
Discharge: HOME | End: 2024-08-05
Attending: STUDENT IN AN ORGANIZED HEALTH CARE EDUCATION/TRAINING PROGRAM
Payer: MEDICARE

## 2024-08-05 VITALS
DIASTOLIC BLOOD PRESSURE: 80 MMHG | HEIGHT: 60 IN | WEIGHT: 167.11 LBS | HEART RATE: 62 BPM | RESPIRATION RATE: 14 BRPM | BODY MASS INDEX: 32.81 KG/M2 | OXYGEN SATURATION: 96 % | TEMPERATURE: 98.1 F | SYSTOLIC BLOOD PRESSURE: 122 MMHG

## 2024-08-05 DIAGNOSIS — H61.22 IMPACTED CERUMEN OF LEFT EAR: Primary | ICD-10-CM

## 2024-08-05 PROCEDURE — 69209 REMOVE IMPACTED EAR WAX UNI: CPT

## 2024-08-05 PROCEDURE — 99282 EMERGENCY DEPT VISIT SF MDM: CPT

## 2024-08-05 PROCEDURE — 6370000000 HC RX 637 (ALT 250 FOR IP): Performed by: STUDENT IN AN ORGANIZED HEALTH CARE EDUCATION/TRAINING PROGRAM

## 2024-08-05 RX ADMIN — DOCUSATE SODIUM 20 MG: 50 LIQUID ORAL at 11:14

## 2024-08-05 ASSESSMENT — PAIN SCALES - GENERAL: PAINLEVEL_OUTOF10: 0

## 2024-08-05 ASSESSMENT — ENCOUNTER SYMPTOMS: SHORTNESS OF BREATH: 0

## 2024-08-05 NOTE — ED PROVIDER NOTES
Department should their condition change or worsen prior to their follow-up appointment.  All questions have been answered and patient and/or available family express understanding.      LABORATORY RESULTS:  Labs Reviewed - No data to display    All other labs were within normal range or not returned as of this dictation.    IMAGING RESULTS:  No orders to display        MEDICATIONS GIVEN:  Medications   docusate (COLACE) 50 MG/5ML liquid 20 mg (20 mg Otic Given 8/5/24 1114)       IMPRESSION:  1. Impacted cerumen of left ear        PLAN:  DISPOSITION Decision To Discharge 08/05/2024 11:52:08 AM      PATIENT REFERRED TO:  [facility]  [address] [place] [address]  [phone]  In 1 week  As needed      DISCHARGE MEDICATIONS:  New Prescriptions    No medications on file       Signed By: Anders Pascual MD     August 5, 2024        (Please note that portions of this note were completed with a voice recognition program.  Efforts were made to edit the dictations but occasionally words are mis-transcribed.)         Anders Pascual MD  08/05/24 [id number]

## 2024-08-05 NOTE — ED TRIAGE NOTES
ED triage note: ambulatory with a steady gait. Patient reports \"off and on all summer, I cannot get water out of the left ear.\" Reports lightheadedness and dizziness started this morning.     Dr. Pascual at bedside.

## 2024-08-05 NOTE — ED NOTES
Patient left ear irrigated with normal saline and a 18 gauge catheter. Nurse Drake was able to remove a large piece of ear wax with instant relief and hearing restored. MD made aware.

## 2024-09-10 ENCOUNTER — TRANSCRIBE ORDERS (OUTPATIENT)
Facility: HOSPITAL | Age: 69
End: 2024-09-10

## 2024-09-10 DIAGNOSIS — M54.50 LUMBAR PAIN: Primary | ICD-10-CM

## 2024-09-10 DIAGNOSIS — S32.010A CLOSED WEDGE COMPRESSION FRACTURE OF L1 VERTEBRA, INITIAL ENCOUNTER (HCC): ICD-10-CM

## 2024-09-11 ENCOUNTER — HOSPITAL ENCOUNTER (OUTPATIENT)
Facility: HOSPITAL | Age: 69
Discharge: HOME | End: 2024-09-14
Attending: ORTHOPAEDIC SURGERY
Payer: MEDICARE

## 2024-09-11 DIAGNOSIS — M54.50 LUMBAR PAIN: ICD-10-CM

## 2024-09-11 DIAGNOSIS — S32.010A CLOSED WEDGE COMPRESSION FRACTURE OF L1 VERTEBRA, INITIAL ENCOUNTER (HCC): ICD-10-CM

## 2024-09-11 PROCEDURE — 72148 MRI LUMBAR SPINE W/O DYE: CPT

## 2024-09-12 ENCOUNTER — TELEPHONE (OUTPATIENT)
Age: 69
End: 2024-09-12

## 2024-09-18 ENCOUNTER — HOSPITAL ENCOUNTER (OUTPATIENT)
Facility: HOSPITAL | Age: 69
Discharge: HOME | End: 2024-09-21
Payer: MEDICARE

## 2024-09-18 VITALS
WEIGHT: 167 LBS | HEART RATE: 68 BPM | TEMPERATURE: 97.8 F | SYSTOLIC BLOOD PRESSURE: 121 MMHG | DIASTOLIC BLOOD PRESSURE: 78 MMHG | BODY MASS INDEX: 32.79 KG/M2 | HEIGHT: 60 IN

## 2024-09-18 LAB
ANION GAP SERPL CALC-SCNC: 6 MMOL/L (ref 2–12)
BASOPHILS # BLD: 0.1 K/UL (ref 0–0.1)
BASOPHILS NFR BLD: 1 % (ref 0–1)
BUN SERPL-MCNC: 16 MG/DL (ref 6–20)
BUN/CREAT SERPL: 21 (ref 12–20)
CALCIUM SERPL-MCNC: 9.5 MG/DL (ref 8.5–10.1)
CHLORIDE SERPL-SCNC: 102 MMOL/L (ref 97–108)
CO2 SERPL-SCNC: 28 MMOL/L (ref 21–32)
CREAT SERPL-MCNC: 0.76 MG/DL (ref 0.55–1.02)
DIFFERENTIAL METHOD BLD: NORMAL
EKG ATRIAL RATE: 62 BPM
EKG ATRIAL RATE: 63 BPM
EKG DIAGNOSIS: NORMAL
EKG DIAGNOSIS: NORMAL
EKG P AXIS: 31 DEGREES
EKG P AXIS: 33 DEGREES
EKG P-R INTERVAL: 158 MS
EKG P-R INTERVAL: 160 MS
EKG Q-T INTERVAL: 402 MS
EKG Q-T INTERVAL: 404 MS
EKG QRS DURATION: 88 MS
EKG QRS DURATION: 90 MS
EKG QTC CALCULATION (BAZETT): 410 MS
EKG QTC CALCULATION (BAZETT): 411 MS
EKG R AXIS: -17 DEGREES
EKG R AXIS: -19 DEGREES
EKG T AXIS: 12 DEGREES
EKG T AXIS: 19 DEGREES
EKG VENTRICULAR RATE: 62 BPM
EKG VENTRICULAR RATE: 63 BPM
EOSINOPHIL # BLD: 0.2 K/UL (ref 0–0.4)
EOSINOPHIL NFR BLD: 4 % (ref 0–7)
ERYTHROCYTE [DISTWIDTH] IN BLOOD BY AUTOMATED COUNT: 12.3 % (ref 11.5–14.5)
GLUCOSE SERPL-MCNC: 123 MG/DL (ref 65–100)
HCT VFR BLD AUTO: 35.5 % (ref 35–47)
HGB BLD-MCNC: 12.1 G/DL (ref 11.5–16)
IMM GRANULOCYTES # BLD AUTO: 0 K/UL (ref 0–0.04)
IMM GRANULOCYTES NFR BLD AUTO: 0 % (ref 0–0.5)
LYMPHOCYTES # BLD: 2.1 K/UL (ref 0.8–3.5)
LYMPHOCYTES NFR BLD: 33 % (ref 12–49)
MCH RBC QN AUTO: 30.3 PG (ref 26–34)
MCHC RBC AUTO-ENTMCNC: 34.1 G/DL (ref 30–36.5)
MCV RBC AUTO: 88.8 FL (ref 80–99)
MONOCYTES # BLD: 0.6 K/UL (ref 0–1)
MONOCYTES NFR BLD: 10 % (ref 5–13)
NEUTS SEG # BLD: 3.3 K/UL (ref 1.8–8)
NEUTS SEG NFR BLD: 52 % (ref 32–75)
NRBC # BLD: 0 K/UL (ref 0–0.01)
NRBC BLD-RTO: 0 PER 100 WBC
PLATELET # BLD AUTO: 266 K/UL (ref 150–400)
PMV BLD AUTO: 9.4 FL (ref 8.9–12.9)
POTASSIUM SERPL-SCNC: 3.4 MMOL/L (ref 3.5–5.1)
RBC # BLD AUTO: 4 M/UL (ref 3.8–5.2)
SODIUM SERPL-SCNC: 136 MMOL/L (ref 136–145)
WBC # BLD AUTO: 6.3 K/UL (ref 3.6–11)

## 2024-09-18 PROCEDURE — 36415 COLL VENOUS BLD VENIPUNCTURE: CPT

## 2024-09-18 PROCEDURE — 85025 COMPLETE CBC W/AUTO DIFF WBC: CPT

## 2024-09-18 PROCEDURE — 80048 BASIC METABOLIC PNL TOTAL CA: CPT

## 2024-09-18 PROCEDURE — APPNB30 APP NON BILLABLE TIME 0-30 MINS: Performed by: NURSE PRACTITIONER

## 2024-09-18 RX ORDER — GABAPENTIN 100 MG/1
100 CAPSULE ORAL 2 TIMES DAILY
COMMUNITY

## 2024-09-18 RX ORDER — TRAMADOL HYDROCHLORIDE 50 MG/1
50 TABLET ORAL EVERY 6 HOURS PRN
COMMUNITY

## 2024-09-18 RX ORDER — SENNOSIDES A AND B 8.6 MG/1
1 TABLET, FILM COATED ORAL DAILY
COMMUNITY

## 2024-09-18 ASSESSMENT — PAIN DESCRIPTION - LOCATION: LOCATION: BACK

## 2024-09-18 ASSESSMENT — PAIN SCALES - GENERAL: PAINLEVEL_OUTOF10: 4

## 2024-09-18 ASSESSMENT — PAIN DESCRIPTION - ORIENTATION: ORIENTATION: POSTERIOR

## 2024-09-19 ENCOUNTER — TELEPHONE (OUTPATIENT)
Age: 69
End: 2024-09-19

## 2024-09-19 LAB
BACTERIA SPEC CULT: NORMAL
BACTERIA SPEC CULT: NORMAL
SERVICE CMNT-IMP: NORMAL

## 2024-09-20 ENCOUNTER — OFFICE VISIT (OUTPATIENT)
Age: 69
End: 2024-09-20
Payer: MEDICARE

## 2024-09-20 VITALS
HEIGHT: 60 IN | WEIGHT: 166.6 LBS | HEART RATE: 65 BPM | RESPIRATION RATE: 16 BRPM | OXYGEN SATURATION: 98 % | DIASTOLIC BLOOD PRESSURE: 78 MMHG | SYSTOLIC BLOOD PRESSURE: 126 MMHG | BODY MASS INDEX: 32.71 KG/M2 | TEMPERATURE: 98.4 F

## 2024-09-20 DIAGNOSIS — Z12.31 VISIT FOR SCREENING MAMMOGRAM: ICD-10-CM

## 2024-09-20 DIAGNOSIS — R73.02 IMPAIRED GLUCOSE TOLERANCE (ORAL): ICD-10-CM

## 2024-09-20 DIAGNOSIS — Z00.00 ENCOUNTER FOR MEDICARE ANNUAL WELLNESS EXAM: Primary | ICD-10-CM

## 2024-09-20 DIAGNOSIS — E78.5 HYPERLIPIDEMIA, UNSPECIFIED HYPERLIPIDEMIA TYPE: ICD-10-CM

## 2024-09-20 DIAGNOSIS — I10 ESSENTIAL (PRIMARY) HYPERTENSION: ICD-10-CM

## 2024-09-20 DIAGNOSIS — Z01.818 PRE-OP EVALUATION: ICD-10-CM

## 2024-09-20 LAB
CHOLEST SERPL-MCNC: 166 MG/DL
EST. AVERAGE GLUCOSE BLD GHB EST-MCNC: 111 MG/DL
HBA1C MFR BLD: 5.5 % (ref 4–5.6)
HDLC SERPL-MCNC: 49 MG/DL
HDLC SERPL: 3.4 (ref 0–5)
LDLC SERPL CALC-MCNC: 97.4 MG/DL (ref 0–100)
TRIGL SERPL-MCNC: 98 MG/DL
VLDLC SERPL CALC-MCNC: 19.6 MG/DL

## 2024-09-20 PROCEDURE — 99213 OFFICE O/P EST LOW 20 MIN: CPT | Performed by: FAMILY MEDICINE

## 2024-09-20 SDOH — ECONOMIC STABILITY: FOOD INSECURITY: WITHIN THE PAST 12 MONTHS, THE FOOD YOU BOUGHT JUST DIDN'T LAST AND YOU DIDN'T HAVE MONEY TO GET MORE.: NEVER TRUE

## 2024-09-20 SDOH — ECONOMIC STABILITY: INCOME INSECURITY: HOW HARD IS IT FOR YOU TO PAY FOR THE VERY BASICS LIKE FOOD, HOUSING, MEDICAL CARE, AND HEATING?: NOT VERY HARD

## 2024-09-20 SDOH — ECONOMIC STABILITY: FOOD INSECURITY: WITHIN THE PAST 12 MONTHS, YOU WORRIED THAT YOUR FOOD WOULD RUN OUT BEFORE YOU GOT MONEY TO BUY MORE.: NEVER TRUE

## 2024-09-20 ASSESSMENT — ENCOUNTER SYMPTOMS
NAUSEA: 0
COUGH: 0
WHEEZING: 0
DIARRHEA: 0
CHEST TIGHTNESS: 0
VOMITING: 0
BACK PAIN: 1
SHORTNESS OF BREATH: 0
CONSTIPATION: 0

## 2024-09-20 ASSESSMENT — LIFESTYLE VARIABLES
HOW MANY STANDARD DRINKS CONTAINING ALCOHOL DO YOU HAVE ON A TYPICAL DAY: 1 OR 2
HOW OFTEN DO YOU HAVE A DRINK CONTAINING ALCOHOL: 2-3 TIMES A WEEK

## 2024-09-20 ASSESSMENT — PATIENT HEALTH QUESTIONNAIRE - PHQ9
SUM OF ALL RESPONSES TO PHQ9 QUESTIONS 1 & 2: 0
SUM OF ALL RESPONSES TO PHQ QUESTIONS 1-9: 0
1. LITTLE INTEREST OR PLEASURE IN DOING THINGS: NOT AT ALL
2. FEELING DOWN, DEPRESSED OR HOPELESS: NOT AT ALL
SUM OF ALL RESPONSES TO PHQ QUESTIONS 1-9: 0

## 2024-09-23 ENCOUNTER — ANESTHESIA (OUTPATIENT)
Facility: HOSPITAL | Age: 69
DRG: 460 | End: 2024-09-23
Payer: MEDICARE

## 2024-09-23 ENCOUNTER — APPOINTMENT (OUTPATIENT)
Facility: HOSPITAL | Age: 69
DRG: 460 | End: 2024-09-23
Attending: NEUROLOGICAL SURGERY
Payer: MEDICARE

## 2024-09-23 ENCOUNTER — HOSPITAL ENCOUNTER (INPATIENT)
Facility: HOSPITAL | Age: 69
LOS: 3 days | Discharge: HOME HEALTH | DRG: 460 | End: 2024-09-26
Attending: NEUROLOGICAL SURGERY | Admitting: NEUROLOGICAL SURGERY
Payer: MEDICARE

## 2024-09-23 ENCOUNTER — ANESTHESIA EVENT (OUTPATIENT)
Facility: HOSPITAL | Age: 69
DRG: 460 | End: 2024-09-23
Payer: MEDICARE

## 2024-09-23 DIAGNOSIS — Z98.1 S/P LUMBAR FUSION: Primary | ICD-10-CM

## 2024-09-23 PROBLEM — S32.001S LUMBAR BURST FRACTURE, SEQUELA: Status: ACTIVE | Noted: 2024-09-23

## 2024-09-23 LAB
ABO + RH BLD: NORMAL
BLOOD GROUP ANTIBODIES SERPL: NORMAL
SPECIMEN EXP DATE BLD: NORMAL

## 2024-09-23 PROCEDURE — 2580000003 HC RX 258: Performed by: NEUROLOGICAL SURGERY

## 2024-09-23 PROCEDURE — 2580000003 HC RX 258: Performed by: ANESTHESIOLOGY

## 2024-09-23 PROCEDURE — 88342 IMHCHEM/IMCYTCHM 1ST ANTB: CPT

## 2024-09-23 PROCEDURE — 3600000014 HC SURGERY LEVEL 4 ADDTL 15MIN: Performed by: NEUROLOGICAL SURGERY

## 2024-09-23 PROCEDURE — 2580000003 HC RX 258: Performed by: NURSE ANESTHETIST, CERTIFIED REGISTERED

## 2024-09-23 PROCEDURE — 6370000000 HC RX 637 (ALT 250 FOR IP): Performed by: ANESTHESIOLOGY

## 2024-09-23 PROCEDURE — 0SG0071 FUSION OF LUMBAR VERTEBRAL JOINT WITH AUTOLOGOUS TISSUE SUBSTITUTE, POSTERIOR APPROACH, POSTERIOR COLUMN, OPEN APPROACH: ICD-10-PCS | Performed by: NEUROLOGICAL SURGERY

## 2024-09-23 PROCEDURE — 6360000002 HC RX W HCPCS: Performed by: NURSE ANESTHETIST, CERTIFIED REGISTERED

## 2024-09-23 PROCEDURE — 86850 RBC ANTIBODY SCREEN: CPT

## 2024-09-23 PROCEDURE — 86901 BLOOD TYPING SEROLOGIC RH(D): CPT

## 2024-09-23 PROCEDURE — 36415 COLL VENOUS BLD VENIPUNCTURE: CPT

## 2024-09-23 PROCEDURE — 88307 TISSUE EXAM BY PATHOLOGIST: CPT

## 2024-09-23 PROCEDURE — 7100000001 HC PACU RECOVERY - ADDTL 15 MIN: Performed by: NEUROLOGICAL SURGERY

## 2024-09-23 PROCEDURE — 7100000000 HC PACU RECOVERY - FIRST 15 MIN: Performed by: NEUROLOGICAL SURGERY

## 2024-09-23 PROCEDURE — C1713 ANCHOR/SCREW BN/BN,TIS/BN: HCPCS | Performed by: NEUROLOGICAL SURGERY

## 2024-09-23 PROCEDURE — 86900 BLOOD TYPING SEROLOGIC ABO: CPT

## 2024-09-23 PROCEDURE — 88311 DECALCIFY TISSUE: CPT

## 2024-09-23 PROCEDURE — 2709999900 HC NON-CHARGEABLE SUPPLY: Performed by: NEUROLOGICAL SURGERY

## 2024-09-23 PROCEDURE — 2500000003 HC RX 250 WO HCPCS: Performed by: NURSE ANESTHETIST, CERTIFIED REGISTERED

## 2024-09-23 PROCEDURE — 6360000002 HC RX W HCPCS: Performed by: NEUROLOGICAL SURGERY

## 2024-09-23 PROCEDURE — 0QB03ZX EXCISION OF LUMBAR VERTEBRA, PERCUTANEOUS APPROACH, DIAGNOSTIC: ICD-10-PCS | Performed by: NEUROLOGICAL SURGERY

## 2024-09-23 PROCEDURE — 0RGA071 FUSION OF THORACOLUMBAR VERTEBRAL JOINT WITH AUTOLOGOUS TISSUE SUBSTITUTE, POSTERIOR APPROACH, POSTERIOR COLUMN, OPEN APPROACH: ICD-10-PCS | Performed by: NEUROLOGICAL SURGERY

## 2024-09-23 PROCEDURE — C1729 CATH, DRAINAGE: HCPCS | Performed by: NEUROLOGICAL SURGERY

## 2024-09-23 PROCEDURE — 1100000000 HC RM PRIVATE

## 2024-09-23 PROCEDURE — 2720000010 HC SURG SUPPLY STERILE: Performed by: NEUROLOGICAL SURGERY

## 2024-09-23 PROCEDURE — 3700000000 HC ANESTHESIA ATTENDED CARE: Performed by: NEUROLOGICAL SURGERY

## 2024-09-23 PROCEDURE — 99024 POSTOP FOLLOW-UP VISIT: CPT | Performed by: NURSE PRACTITIONER

## 2024-09-23 PROCEDURE — 6370000000 HC RX 637 (ALT 250 FOR IP): Performed by: NEUROLOGICAL SURGERY

## 2024-09-23 PROCEDURE — 00NY0ZZ RELEASE LUMBAR SPINAL CORD, OPEN APPROACH: ICD-10-PCS | Performed by: NEUROLOGICAL SURGERY

## 2024-09-23 PROCEDURE — 8E0WXBZ COMPUTER ASSISTED PROCEDURE OF TRUNK REGION: ICD-10-PCS | Performed by: NEUROLOGICAL SURGERY

## 2024-09-23 PROCEDURE — 3700000001 HC ADD 15 MINUTES (ANESTHESIA): Performed by: NEUROLOGICAL SURGERY

## 2024-09-23 PROCEDURE — 2500000003 HC RX 250 WO HCPCS: Performed by: NEUROLOGICAL SURGERY

## 2024-09-23 PROCEDURE — 6360000002 HC RX W HCPCS: Performed by: ANESTHESIOLOGY

## 2024-09-23 PROCEDURE — 3600000004 HC SURGERY LEVEL 4 BASE: Performed by: NEUROLOGICAL SURGERY

## 2024-09-23 DEVICE — MAS 55840026545T 5.5/6.0 FENSTRTD 6.5X45
Type: IMPLANTABLE DEVICE | Site: SPINE LUMBAR | Status: FUNCTIONAL
Brand: CD HORIZON® FENESTRATED SCREW SET

## 2024-09-23 DEVICE — GRAFT BNE SUB L CANC FRZN MORSELIZED W/ VIABLE CELL TRINITY: Type: IMPLANTABLE DEVICE | Site: SPINE LUMBAR | Status: FUNCTIONAL

## 2024-09-23 DEVICE — ROD 1553201070 5.5 TI CP4 NS CURV 70MM
Type: IMPLANTABLE DEVICE | Site: SPINE LUMBAR | Status: FUNCTIONAL
Brand: CD HORIZON® SPINAL SYSTEM

## 2024-09-23 RX ORDER — MIDAZOLAM HYDROCHLORIDE 1 MG/ML
INJECTION INTRAMUSCULAR; INTRAVENOUS
Status: DISCONTINUED | OUTPATIENT
Start: 2024-09-23 | End: 2024-09-23 | Stop reason: SDUPTHER

## 2024-09-23 RX ORDER — ATORVASTATIN CALCIUM 40 MG/1
40 TABLET, FILM COATED ORAL NIGHTLY
Status: DISCONTINUED | OUTPATIENT
Start: 2024-09-23 | End: 2024-09-26 | Stop reason: HOSPADM

## 2024-09-23 RX ORDER — DIPHENHYDRAMINE HYDROCHLORIDE 50 MG/ML
12.5 INJECTION INTRAMUSCULAR; INTRAVENOUS
Status: DISCONTINUED | OUTPATIENT
Start: 2024-09-23 | End: 2024-09-23 | Stop reason: HOSPADM

## 2024-09-23 RX ORDER — BISACODYL 10 MG
10 SUPPOSITORY, RECTAL RECTAL DAILY PRN
Status: DISCONTINUED | OUTPATIENT
Start: 2024-09-23 | End: 2024-09-26 | Stop reason: HOSPADM

## 2024-09-23 RX ORDER — SENNOSIDES A AND B 8.6 MG/1
1 TABLET, FILM COATED ORAL DAILY
Status: DISCONTINUED | OUTPATIENT
Start: 2024-09-23 | End: 2024-09-26 | Stop reason: HOSPADM

## 2024-09-23 RX ORDER — OXYCODONE HYDROCHLORIDE 5 MG/1
5 TABLET ORAL EVERY 4 HOURS PRN
Status: DISCONTINUED | OUTPATIENT
Start: 2024-09-23 | End: 2024-09-26 | Stop reason: HOSPADM

## 2024-09-23 RX ORDER — CYCLOBENZAPRINE HCL 10 MG
10 TABLET ORAL EVERY 12 HOURS PRN
Status: DISCONTINUED | OUTPATIENT
Start: 2024-09-23 | End: 2024-09-26 | Stop reason: HOSPADM

## 2024-09-23 RX ORDER — LABETALOL HYDROCHLORIDE 5 MG/ML
10 INJECTION, SOLUTION INTRAVENOUS
Status: DISCONTINUED | OUTPATIENT
Start: 2024-09-23 | End: 2024-09-23 | Stop reason: HOSPADM

## 2024-09-23 RX ORDER — OXYCODONE HYDROCHLORIDE 5 MG/1
5 TABLET ORAL
Status: DISCONTINUED | OUTPATIENT
Start: 2024-09-23 | End: 2024-09-23 | Stop reason: HOSPADM

## 2024-09-23 RX ORDER — SODIUM CHLORIDE 9 MG/ML
INJECTION, SOLUTION INTRAVENOUS CONTINUOUS
Status: DISCONTINUED | OUTPATIENT
Start: 2024-09-23 | End: 2024-09-23 | Stop reason: HOSPADM

## 2024-09-23 RX ORDER — LIDOCAINE HYDROCHLORIDE 20 MG/ML
INJECTION, SOLUTION EPIDURAL; INFILTRATION; INTRACAUDAL; PERINEURAL
Status: DISCONTINUED | OUTPATIENT
Start: 2024-09-23 | End: 2024-09-23 | Stop reason: SDUPTHER

## 2024-09-23 RX ORDER — PROCHLORPERAZINE EDISYLATE 5 MG/ML
5 INJECTION INTRAMUSCULAR; INTRAVENOUS
Status: DISCONTINUED | OUTPATIENT
Start: 2024-09-23 | End: 2024-09-23 | Stop reason: HOSPADM

## 2024-09-23 RX ORDER — PROPOFOL 10 MG/ML
INJECTION, EMULSION INTRAVENOUS
Status: DISCONTINUED | OUTPATIENT
Start: 2024-09-23 | End: 2024-09-23 | Stop reason: SDUPTHER

## 2024-09-23 RX ORDER — SODIUM CHLORIDE, SODIUM LACTATE, POTASSIUM CHLORIDE, CALCIUM CHLORIDE 600; 310; 30; 20 MG/100ML; MG/100ML; MG/100ML; MG/100ML
INJECTION, SOLUTION INTRAVENOUS CONTINUOUS
Status: DISCONTINUED | OUTPATIENT
Start: 2024-09-23 | End: 2024-09-25

## 2024-09-23 RX ORDER — MORPHINE SULFATE 4 MG/ML
2 INJECTION, SOLUTION INTRAMUSCULAR; INTRAVENOUS
Status: DISCONTINUED | OUTPATIENT
Start: 2024-09-23 | End: 2024-09-26 | Stop reason: HOSPADM

## 2024-09-23 RX ORDER — LIDOCAINE HYDROCHLORIDE AND EPINEPHRINE 10; 10 MG/ML; UG/ML
INJECTION, SOLUTION INFILTRATION; PERINEURAL PRN
Status: DISCONTINUED | OUTPATIENT
Start: 2024-09-23 | End: 2024-09-23 | Stop reason: HOSPADM

## 2024-09-23 RX ORDER — GABAPENTIN 100 MG/1
100 CAPSULE ORAL 2 TIMES DAILY
Status: DISCONTINUED | OUTPATIENT
Start: 2024-09-23 | End: 2024-09-26 | Stop reason: HOSPADM

## 2024-09-23 RX ORDER — HYDROCHLOROTHIAZIDE 25 MG/1
12.5 TABLET ORAL DAILY
Status: DISCONTINUED | OUTPATIENT
Start: 2024-09-23 | End: 2024-09-26 | Stop reason: HOSPADM

## 2024-09-23 RX ORDER — FENTANYL CITRATE 50 UG/ML
50 INJECTION, SOLUTION INTRAMUSCULAR; INTRAVENOUS PRN
Status: DISCONTINUED | OUTPATIENT
Start: 2024-09-23 | End: 2024-09-23 | Stop reason: HOSPADM

## 2024-09-23 RX ORDER — DEXAMETHASONE SODIUM PHOSPHATE 4 MG/ML
INJECTION, SOLUTION INTRA-ARTICULAR; INTRALESIONAL; INTRAMUSCULAR; INTRAVENOUS; SOFT TISSUE
Status: DISCONTINUED | OUTPATIENT
Start: 2024-09-23 | End: 2024-09-23 | Stop reason: SDUPTHER

## 2024-09-23 RX ORDER — SODIUM CHLORIDE 0.9 % (FLUSH) 0.9 %
5-40 SYRINGE (ML) INJECTION PRN
Status: DISCONTINUED | OUTPATIENT
Start: 2024-09-23 | End: 2024-09-23 | Stop reason: HOSPADM

## 2024-09-23 RX ORDER — ACETAMINOPHEN 325 MG/1
650 TABLET ORAL EVERY 6 HOURS
Status: DISCONTINUED | OUTPATIENT
Start: 2024-09-23 | End: 2024-09-26 | Stop reason: HOSPADM

## 2024-09-23 RX ORDER — POLYETHYLENE GLYCOL 3350 17 G/17G
17 POWDER, FOR SOLUTION ORAL DAILY PRN
Status: DISCONTINUED | OUTPATIENT
Start: 2024-09-23 | End: 2024-09-26 | Stop reason: HOSPADM

## 2024-09-23 RX ORDER — SODIUM CHLORIDE, SODIUM LACTATE, POTASSIUM CHLORIDE, CALCIUM CHLORIDE 600; 310; 30; 20 MG/100ML; MG/100ML; MG/100ML; MG/100ML
INJECTION, SOLUTION INTRAVENOUS CONTINUOUS
Status: DISCONTINUED | OUTPATIENT
Start: 2024-09-23 | End: 2024-09-23 | Stop reason: HOSPADM

## 2024-09-23 RX ORDER — HYDROXYZINE HYDROCHLORIDE 10 MG/1
10 TABLET, FILM COATED ORAL EVERY 8 HOURS PRN
Status: DISCONTINUED | OUTPATIENT
Start: 2024-09-23 | End: 2024-09-26 | Stop reason: HOSPADM

## 2024-09-23 RX ORDER — MEPERIDINE HYDROCHLORIDE 25 MG/ML
12.5 INJECTION INTRAMUSCULAR; INTRAVENOUS; SUBCUTANEOUS EVERY 5 MIN PRN
Status: DISCONTINUED | OUTPATIENT
Start: 2024-09-23 | End: 2024-09-23 | Stop reason: HOSPADM

## 2024-09-23 RX ORDER — FENTANYL CITRATE 50 UG/ML
25 INJECTION, SOLUTION INTRAMUSCULAR; INTRAVENOUS EVERY 5 MIN PRN
Status: DISCONTINUED | OUTPATIENT
Start: 2024-09-23 | End: 2024-09-23 | Stop reason: HOSPADM

## 2024-09-23 RX ORDER — ROCURONIUM BROMIDE 10 MG/ML
INJECTION, SOLUTION INTRAVENOUS
Status: DISCONTINUED | OUTPATIENT
Start: 2024-09-23 | End: 2024-09-23 | Stop reason: SDUPTHER

## 2024-09-23 RX ORDER — SODIUM CHLORIDE 9 MG/ML
INJECTION, SOLUTION INTRAVENOUS PRN
Status: DISCONTINUED | OUTPATIENT
Start: 2024-09-23 | End: 2024-09-26 | Stop reason: HOSPADM

## 2024-09-23 RX ORDER — CETIRIZINE HYDROCHLORIDE 10 MG/1
10 TABLET ORAL DAILY
Status: DISCONTINUED | OUTPATIENT
Start: 2024-09-24 | End: 2024-09-26 | Stop reason: HOSPADM

## 2024-09-23 RX ORDER — NALOXONE HYDROCHLORIDE 0.4 MG/ML
INJECTION, SOLUTION INTRAMUSCULAR; INTRAVENOUS; SUBCUTANEOUS PRN
Status: DISCONTINUED | OUTPATIENT
Start: 2024-09-23 | End: 2024-09-23 | Stop reason: HOSPADM

## 2024-09-23 RX ORDER — SUCCINYLCHOLINE/SOD CL,ISO/PF 200MG/10ML
SYRINGE (ML) INTRAVENOUS
Status: DISCONTINUED | OUTPATIENT
Start: 2024-09-23 | End: 2024-09-23 | Stop reason: SDUPTHER

## 2024-09-23 RX ORDER — FENTANYL CITRATE 50 UG/ML
25 INJECTION, SOLUTION INTRAMUSCULAR; INTRAVENOUS PRN
Status: DISCONTINUED | OUTPATIENT
Start: 2024-09-23 | End: 2024-09-23 | Stop reason: HOSPADM

## 2024-09-23 RX ORDER — SODIUM CHLORIDE 0.9 % (FLUSH) 0.9 %
5-40 SYRINGE (ML) INJECTION EVERY 12 HOURS SCHEDULED
Status: DISCONTINUED | OUTPATIENT
Start: 2024-09-23 | End: 2024-09-23 | Stop reason: HOSPADM

## 2024-09-23 RX ORDER — GLYCOPYRROLATE 0.2 MG/ML
INJECTION INTRAMUSCULAR; INTRAVENOUS
Status: DISCONTINUED | OUTPATIENT
Start: 2024-09-23 | End: 2024-09-23 | Stop reason: SDUPTHER

## 2024-09-23 RX ORDER — VALSARTAN 160 MG/1
320 TABLET ORAL DAILY
Status: DISCONTINUED | OUTPATIENT
Start: 2024-09-23 | End: 2024-09-26 | Stop reason: HOSPADM

## 2024-09-23 RX ORDER — HYDROMORPHONE HYDROCHLORIDE 2 MG/ML
INJECTION, SOLUTION INTRAMUSCULAR; INTRAVENOUS; SUBCUTANEOUS
Status: DISCONTINUED | OUTPATIENT
Start: 2024-09-23 | End: 2024-09-23 | Stop reason: SDUPTHER

## 2024-09-23 RX ORDER — SODIUM CHLORIDE 0.9 % (FLUSH) 0.9 %
5-40 SYRINGE (ML) INJECTION EVERY 12 HOURS SCHEDULED
Status: DISCONTINUED | OUTPATIENT
Start: 2024-09-23 | End: 2024-09-26 | Stop reason: HOSPADM

## 2024-09-23 RX ORDER — LIDOCAINE HYDROCHLORIDE 10 MG/ML
1 INJECTION, SOLUTION EPIDURAL; INFILTRATION; INTRACAUDAL; PERINEURAL
Status: DISCONTINUED | OUTPATIENT
Start: 2024-09-23 | End: 2024-09-23 | Stop reason: HOSPADM

## 2024-09-23 RX ORDER — MORPHINE SULFATE 4 MG/ML
4 INJECTION, SOLUTION INTRAMUSCULAR; INTRAVENOUS
Status: DISCONTINUED | OUTPATIENT
Start: 2024-09-23 | End: 2024-09-26 | Stop reason: HOSPADM

## 2024-09-23 RX ORDER — ONDANSETRON 2 MG/ML
INJECTION INTRAMUSCULAR; INTRAVENOUS
Status: DISCONTINUED | OUTPATIENT
Start: 2024-09-23 | End: 2024-09-23 | Stop reason: SDUPTHER

## 2024-09-23 RX ORDER — VALSARTAN AND HYDROCHLOROTHIAZIDE 320; 12.5 MG/1; MG/1
1 TABLET, FILM COATED ORAL DAILY
Status: DISCONTINUED | OUTPATIENT
Start: 2024-09-23 | End: 2024-09-23 | Stop reason: SDUPTHER

## 2024-09-23 RX ORDER — SODIUM CHLORIDE 9 MG/ML
INJECTION, SOLUTION INTRAVENOUS PRN
Status: DISCONTINUED | OUTPATIENT
Start: 2024-09-23 | End: 2024-09-23 | Stop reason: HOSPADM

## 2024-09-23 RX ORDER — FENTANYL CITRATE 50 UG/ML
INJECTION, SOLUTION INTRAMUSCULAR; INTRAVENOUS
Status: DISCONTINUED | OUTPATIENT
Start: 2024-09-23 | End: 2024-09-23 | Stop reason: SDUPTHER

## 2024-09-23 RX ORDER — EPHEDRINE SULFATE 50 MG/ML
INJECTION INTRAVENOUS
Status: DISCONTINUED | OUTPATIENT
Start: 2024-09-23 | End: 2024-09-23 | Stop reason: SDUPTHER

## 2024-09-23 RX ORDER — ONDANSETRON 2 MG/ML
4 INJECTION INTRAMUSCULAR; INTRAVENOUS
Status: DISCONTINUED | OUTPATIENT
Start: 2024-09-23 | End: 2024-09-23 | Stop reason: HOSPADM

## 2024-09-23 RX ORDER — ONDANSETRON 4 MG/1
4 TABLET, ORALLY DISINTEGRATING ORAL EVERY 8 HOURS PRN
Status: DISCONTINUED | OUTPATIENT
Start: 2024-09-23 | End: 2024-09-26 | Stop reason: HOSPADM

## 2024-09-23 RX ORDER — ONDANSETRON 2 MG/ML
4 INJECTION INTRAMUSCULAR; INTRAVENOUS EVERY 6 HOURS PRN
Status: DISCONTINUED | OUTPATIENT
Start: 2024-09-23 | End: 2024-09-26 | Stop reason: HOSPADM

## 2024-09-23 RX ORDER — SODIUM CHLORIDE 0.9 % (FLUSH) 0.9 %
5-40 SYRINGE (ML) INJECTION PRN
Status: DISCONTINUED | OUTPATIENT
Start: 2024-09-23 | End: 2024-09-26 | Stop reason: HOSPADM

## 2024-09-23 RX ORDER — MIDAZOLAM HYDROCHLORIDE 2 MG/2ML
2 INJECTION, SOLUTION INTRAMUSCULAR; INTRAVENOUS PRN
Status: DISCONTINUED | OUTPATIENT
Start: 2024-09-23 | End: 2024-09-23 | Stop reason: HOSPADM

## 2024-09-23 RX ORDER — OXYCODONE HYDROCHLORIDE 5 MG/1
10 TABLET ORAL EVERY 4 HOURS PRN
Status: DISCONTINUED | OUTPATIENT
Start: 2024-09-23 | End: 2024-09-26 | Stop reason: HOSPADM

## 2024-09-23 RX ORDER — MIDAZOLAM HYDROCHLORIDE 2 MG/2ML
2 INJECTION, SOLUTION INTRAMUSCULAR; INTRAVENOUS
Status: DISCONTINUED | OUTPATIENT
Start: 2024-09-23 | End: 2024-09-23 | Stop reason: HOSPADM

## 2024-09-23 RX ORDER — NEOSTIGMINE METHYLSULFATE 1 MG/ML
INJECTION, SOLUTION INTRAVENOUS
Status: DISCONTINUED | OUTPATIENT
Start: 2024-09-23 | End: 2024-09-23 | Stop reason: SDUPTHER

## 2024-09-23 RX ORDER — ACETAMINOPHEN 325 MG/1
650 TABLET ORAL ONCE
Status: COMPLETED | OUTPATIENT
Start: 2024-09-23 | End: 2024-09-23

## 2024-09-23 RX ADMIN — PROPOFOL 150 MG: 10 INJECTION, EMULSION INTRAVENOUS at 08:14

## 2024-09-23 RX ADMIN — Medication 160 MG: at 08:14

## 2024-09-23 RX ADMIN — EPHEDRINE SULFATE 5 MG: 50 INJECTION INTRAVENOUS at 08:24

## 2024-09-23 RX ADMIN — FENTANYL CITRATE 50 MCG: 50 INJECTION, SOLUTION INTRAMUSCULAR; INTRAVENOUS at 08:14

## 2024-09-23 RX ADMIN — ACETAMINOPHEN 650 MG: 325 TABLET ORAL at 07:06

## 2024-09-23 RX ADMIN — ACETAMINOPHEN 650 MG: 325 TABLET ORAL at 14:49

## 2024-09-23 RX ADMIN — ROCURONIUM BROMIDE 10 MG: 10 INJECTION, SOLUTION INTRAVENOUS at 08:14

## 2024-09-23 RX ADMIN — VANCOMYCIN HYDROCHLORIDE 1000 MG: 1 INJECTION, POWDER, LYOPHILIZED, FOR SOLUTION INTRAVENOUS at 07:31

## 2024-09-23 RX ADMIN — PHENYLEPHRINE HYDROCHLORIDE 80 MCG: 10 INJECTION INTRAVENOUS at 08:36

## 2024-09-23 RX ADMIN — SODIUM CHLORIDE, POTASSIUM CHLORIDE, SODIUM LACTATE AND CALCIUM CHLORIDE: 600; 310; 30; 20 INJECTION, SOLUTION INTRAVENOUS at 11:40

## 2024-09-23 RX ADMIN — PHENYLEPHRINE HYDROCHLORIDE 40 MCG/MIN: 10 INJECTION INTRAVENOUS at 08:21

## 2024-09-23 RX ADMIN — NEOSTIGMINE METHYLSULFATE 2 MG: 1 INJECTION, SOLUTION INTRAVENOUS at 10:59

## 2024-09-23 RX ADMIN — GABAPENTIN 100 MG: 100 CAPSULE ORAL at 22:58

## 2024-09-23 RX ADMIN — ACETAMINOPHEN 650 MG: 325 TABLET ORAL at 18:56

## 2024-09-23 RX ADMIN — FENTANYL CITRATE 25 MCG: 50 INJECTION INTRAMUSCULAR; INTRAVENOUS at 12:35

## 2024-09-23 RX ADMIN — HYDROMORPHONE HYDROCHLORIDE 0.6 MG: 2 INJECTION, SOLUTION INTRAMUSCULAR; INTRAVENOUS; SUBCUTANEOUS at 10:12

## 2024-09-23 RX ADMIN — LIDOCAINE HYDROCHLORIDE 60 MG: 20 INJECTION, SOLUTION EPIDURAL; INFILTRATION; INTRACAUDAL; PERINEURAL at 08:14

## 2024-09-23 RX ADMIN — ROCURONIUM BROMIDE 30 MG: 10 INJECTION, SOLUTION INTRAVENOUS at 09:00

## 2024-09-23 RX ADMIN — FENTANYL CITRATE 25 MCG: 50 INJECTION INTRAMUSCULAR; INTRAVENOUS at 12:05

## 2024-09-23 RX ADMIN — SODIUM CHLORIDE, POTASSIUM CHLORIDE, SODIUM LACTATE AND CALCIUM CHLORIDE: 600; 310; 30; 20 INJECTION, SOLUTION INTRAVENOUS at 18:48

## 2024-09-23 RX ADMIN — VANCOMYCIN HYDROCHLORIDE 1000 MG: 1 INJECTION, POWDER, LYOPHILIZED, FOR SOLUTION INTRAVENOUS at 19:05

## 2024-09-23 RX ADMIN — ONDANSETRON 4 MG: 2 INJECTION INTRAMUSCULAR; INTRAVENOUS at 10:55

## 2024-09-23 RX ADMIN — OXYCODONE 10 MG: 5 TABLET ORAL at 22:59

## 2024-09-23 RX ADMIN — SODIUM CHLORIDE, PRESERVATIVE FREE 10 ML: 5 INJECTION INTRAVENOUS at 23:03

## 2024-09-23 RX ADMIN — SENNOSIDES 8.6 MG: 8.6 TABLET, FILM COATED ORAL at 14:50

## 2024-09-23 RX ADMIN — SODIUM CHLORIDE, POTASSIUM CHLORIDE, SODIUM LACTATE AND CALCIUM CHLORIDE: 600; 310; 30; 20 INJECTION, SOLUTION INTRAVENOUS at 07:38

## 2024-09-23 RX ADMIN — DEXAMETHASONE SODIUM PHOSPHATE 8 MG: 4 INJECTION INTRA-ARTICULAR; INTRALESIONAL; INTRAMUSCULAR; INTRAVENOUS; SOFT TISSUE at 08:30

## 2024-09-23 RX ADMIN — FENTANYL CITRATE 25 MCG: 50 INJECTION INTRAMUSCULAR; INTRAVENOUS at 12:10

## 2024-09-23 RX ADMIN — ATORVASTATIN CALCIUM 40 MG: 40 TABLET, FILM COATED ORAL at 22:59

## 2024-09-23 RX ADMIN — ROCURONIUM BROMIDE 10 MG: 10 INJECTION, SOLUTION INTRAVENOUS at 10:00

## 2024-09-23 RX ADMIN — FENTANYL CITRATE 50 MCG: 50 INJECTION, SOLUTION INTRAMUSCULAR; INTRAVENOUS at 08:45

## 2024-09-23 RX ADMIN — MIDAZOLAM 2 MG: 1 INJECTION INTRAMUSCULAR; INTRAVENOUS at 08:08

## 2024-09-23 RX ADMIN — GLYCOPYRROLATE 0.4 MG: 0.2 INJECTION INTRAMUSCULAR; INTRAVENOUS at 10:59

## 2024-09-23 RX ADMIN — OXYCODONE 10 MG: 5 TABLET ORAL at 18:57

## 2024-09-23 RX ADMIN — OXYCODONE 10 MG: 5 TABLET ORAL at 14:50

## 2024-09-23 RX ADMIN — PHENYLEPHRINE HYDROCHLORIDE 80 MCG: 10 INJECTION INTRAVENOUS at 08:14

## 2024-09-23 RX ADMIN — SODIUM CHLORIDE, POTASSIUM CHLORIDE, SODIUM LACTATE AND CALCIUM CHLORIDE: 600; 310; 30; 20 INJECTION, SOLUTION INTRAVENOUS at 07:45

## 2024-09-23 RX ADMIN — ROCURONIUM BROMIDE 40 MG: 10 INJECTION, SOLUTION INTRAVENOUS at 08:30

## 2024-09-23 ASSESSMENT — PAIN DESCRIPTION - DESCRIPTORS
DESCRIPTORS: ACHING

## 2024-09-23 ASSESSMENT — PAIN DESCRIPTION - ORIENTATION
ORIENTATION: LOWER
ORIENTATION: LOWER;MID
ORIENTATION: LOWER

## 2024-09-23 ASSESSMENT — PAIN - FUNCTIONAL ASSESSMENT: PAIN_FUNCTIONAL_ASSESSMENT: 0-10

## 2024-09-23 ASSESSMENT — PAIN SCALES - GENERAL
PAINLEVEL_OUTOF10: 6
PAINLEVEL_OUTOF10: 5
PAINLEVEL_OUTOF10: 7
PAINLEVEL_OUTOF10: 7
PAINLEVEL_OUTOF10: 6

## 2024-09-23 ASSESSMENT — PAIN DESCRIPTION - LOCATION
LOCATION: BACK

## 2024-09-24 LAB
ANION GAP SERPL CALC-SCNC: 3 MMOL/L (ref 2–12)
BUN SERPL-MCNC: 13 MG/DL (ref 6–20)
BUN/CREAT SERPL: 18 (ref 12–20)
CALCIUM SERPL-MCNC: 8.9 MG/DL (ref 8.5–10.1)
CHLORIDE SERPL-SCNC: 107 MMOL/L (ref 97–108)
CO2 SERPL-SCNC: 29 MMOL/L (ref 21–32)
CREAT SERPL-MCNC: 0.74 MG/DL (ref 0.55–1.02)
ERYTHROCYTE [DISTWIDTH] IN BLOOD BY AUTOMATED COUNT: 12.3 % (ref 11.5–14.5)
GLUCOSE SERPL-MCNC: 123 MG/DL (ref 65–100)
HCT VFR BLD AUTO: 29.8 % (ref 35–47)
HGB BLD-MCNC: 10.2 G/DL (ref 11.5–16)
MCH RBC QN AUTO: 30.6 PG (ref 26–34)
MCHC RBC AUTO-ENTMCNC: 34.2 G/DL (ref 30–36.5)
MCV RBC AUTO: 89.5 FL (ref 80–99)
NRBC # BLD: 0 K/UL (ref 0–0.01)
NRBC BLD-RTO: 0 PER 100 WBC
PLATELET # BLD AUTO: 209 K/UL (ref 150–400)
PMV BLD AUTO: 9.3 FL (ref 8.9–12.9)
POTASSIUM SERPL-SCNC: 4 MMOL/L (ref 3.5–5.1)
RBC # BLD AUTO: 3.33 M/UL (ref 3.8–5.2)
SODIUM SERPL-SCNC: 139 MMOL/L (ref 136–145)
WBC # BLD AUTO: 8.5 K/UL (ref 3.6–11)

## 2024-09-24 PROCEDURE — 2580000003 HC RX 258: Performed by: NEUROLOGICAL SURGERY

## 2024-09-24 PROCEDURE — 97116 GAIT TRAINING THERAPY: CPT

## 2024-09-24 PROCEDURE — 97165 OT EVAL LOW COMPLEX 30 MIN: CPT

## 2024-09-24 PROCEDURE — 6360000002 HC RX W HCPCS: Performed by: PHYSICIAN ASSISTANT

## 2024-09-24 PROCEDURE — 97535 SELF CARE MNGMENT TRAINING: CPT

## 2024-09-24 PROCEDURE — 6370000000 HC RX 637 (ALT 250 FOR IP): Performed by: NEUROLOGICAL SURGERY

## 2024-09-24 PROCEDURE — 99024 POSTOP FOLLOW-UP VISIT: CPT | Performed by: PHYSICIAN ASSISTANT

## 2024-09-24 PROCEDURE — 1100000000 HC RM PRIVATE

## 2024-09-24 PROCEDURE — 97530 THERAPEUTIC ACTIVITIES: CPT

## 2024-09-24 PROCEDURE — 85027 COMPLETE CBC AUTOMATED: CPT

## 2024-09-24 PROCEDURE — 2580000003 HC RX 258: Performed by: PHYSICIAN ASSISTANT

## 2024-09-24 PROCEDURE — 97161 PT EVAL LOW COMPLEX 20 MIN: CPT

## 2024-09-24 PROCEDURE — 80048 BASIC METABOLIC PNL TOTAL CA: CPT

## 2024-09-24 RX ADMIN — ACETAMINOPHEN 650 MG: 325 TABLET ORAL at 02:57

## 2024-09-24 RX ADMIN — CETIRIZINE HYDROCHLORIDE 10 MG: 10 TABLET, FILM COATED ORAL at 09:19

## 2024-09-24 RX ADMIN — OXYCODONE 10 MG: 5 TABLET ORAL at 15:39

## 2024-09-24 RX ADMIN — VANCOMYCIN HYDROCHLORIDE 1000 MG: 1 INJECTION, POWDER, LYOPHILIZED, FOR SOLUTION INTRAVENOUS at 14:29

## 2024-09-24 RX ADMIN — OXYCODONE 10 MG: 5 TABLET ORAL at 11:04

## 2024-09-24 RX ADMIN — OXYCODONE 5 MG: 5 TABLET ORAL at 19:20

## 2024-09-24 RX ADMIN — ACETAMINOPHEN 650 MG: 325 TABLET ORAL at 18:28

## 2024-09-24 RX ADMIN — SODIUM CHLORIDE, POTASSIUM CHLORIDE, SODIUM LACTATE AND CALCIUM CHLORIDE: 600; 310; 30; 20 INJECTION, SOLUTION INTRAVENOUS at 03:52

## 2024-09-24 RX ADMIN — ACETAMINOPHEN 650 MG: 325 TABLET ORAL at 14:25

## 2024-09-24 RX ADMIN — OXYCODONE 10 MG: 5 TABLET ORAL at 02:57

## 2024-09-24 RX ADMIN — GABAPENTIN 100 MG: 100 CAPSULE ORAL at 21:11

## 2024-09-24 RX ADMIN — GABAPENTIN 100 MG: 100 CAPSULE ORAL at 09:06

## 2024-09-24 RX ADMIN — ACETAMINOPHEN 650 MG: 325 TABLET ORAL at 09:10

## 2024-09-24 RX ADMIN — ATORVASTATIN CALCIUM 40 MG: 40 TABLET, FILM COATED ORAL at 21:11

## 2024-09-24 RX ADMIN — OXYCODONE 5 MG: 5 TABLET ORAL at 06:56

## 2024-09-24 RX ADMIN — SENNOSIDES 8.6 MG: 8.6 TABLET, FILM COATED ORAL at 09:06

## 2024-09-24 RX ADMIN — SODIUM CHLORIDE, PRESERVATIVE FREE 10 ML: 5 INJECTION INTRAVENOUS at 21:11

## 2024-09-24 ASSESSMENT — PAIN DESCRIPTION - DESCRIPTORS
DESCRIPTORS: ACHING
DESCRIPTORS: ACHING;SORE
DESCRIPTORS: ACHING

## 2024-09-24 ASSESSMENT — PAIN DESCRIPTION - LOCATION
LOCATION: BACK

## 2024-09-24 ASSESSMENT — PAIN DESCRIPTION - ORIENTATION
ORIENTATION: MID
ORIENTATION: MID;LOWER;UPPER

## 2024-09-24 ASSESSMENT — PAIN DESCRIPTION - PAIN TYPE: TYPE: SURGICAL PAIN

## 2024-09-24 ASSESSMENT — PAIN SCALES - GENERAL
PAINLEVEL_OUTOF10: 5
PAINLEVEL_OUTOF10: 5
PAINLEVEL_OUTOF10: 7
PAINLEVEL_OUTOF10: 5
PAINLEVEL_OUTOF10: 3
PAINLEVEL_OUTOF10: 3
PAINLEVEL_OUTOF10: 5
PAINLEVEL_OUTOF10: 4
PAINLEVEL_OUTOF10: 3

## 2024-09-24 ASSESSMENT — PAIN DESCRIPTION - FREQUENCY: FREQUENCY: CONTINUOUS

## 2024-09-24 ASSESSMENT — PAIN - FUNCTIONAL ASSESSMENT: PAIN_FUNCTIONAL_ASSESSMENT: PREVENTS OR INTERFERES SOME ACTIVE ACTIVITIES AND ADLS

## 2024-09-24 ASSESSMENT — PAIN DESCRIPTION - ONSET: ONSET: ON-GOING

## 2024-09-25 LAB
ANION GAP SERPL CALC-SCNC: 7 MMOL/L (ref 2–12)
BUN SERPL-MCNC: 10 MG/DL (ref 6–20)
BUN/CREAT SERPL: 16 (ref 12–20)
CALCIUM SERPL-MCNC: 8.3 MG/DL (ref 8.5–10.1)
CHLORIDE SERPL-SCNC: 105 MMOL/L (ref 97–108)
CO2 SERPL-SCNC: 28 MMOL/L (ref 21–32)
CREAT SERPL-MCNC: 0.64 MG/DL (ref 0.55–1.02)
GLUCOSE SERPL-MCNC: 94 MG/DL (ref 65–100)
POTASSIUM SERPL-SCNC: 3.5 MMOL/L (ref 3.5–5.1)
SODIUM SERPL-SCNC: 140 MMOL/L (ref 136–145)

## 2024-09-25 PROCEDURE — 6360000002 HC RX W HCPCS: Performed by: PHYSICIAN ASSISTANT

## 2024-09-25 PROCEDURE — 2580000003 HC RX 258: Performed by: PHYSICIAN ASSISTANT

## 2024-09-25 PROCEDURE — 97116 GAIT TRAINING THERAPY: CPT

## 2024-09-25 PROCEDURE — 97535 SELF CARE MNGMENT TRAINING: CPT

## 2024-09-25 PROCEDURE — 36415 COLL VENOUS BLD VENIPUNCTURE: CPT

## 2024-09-25 PROCEDURE — 6370000000 HC RX 637 (ALT 250 FOR IP): Performed by: NEUROLOGICAL SURGERY

## 2024-09-25 PROCEDURE — 1100000000 HC RM PRIVATE

## 2024-09-25 PROCEDURE — 80048 BASIC METABOLIC PNL TOTAL CA: CPT

## 2024-09-25 PROCEDURE — 99024 POSTOP FOLLOW-UP VISIT: CPT | Performed by: PHYSICIAN ASSISTANT

## 2024-09-25 PROCEDURE — 2580000003 HC RX 258: Performed by: NEUROLOGICAL SURGERY

## 2024-09-25 RX ADMIN — CETIRIZINE HYDROCHLORIDE 10 MG: 10 TABLET, FILM COATED ORAL at 09:08

## 2024-09-25 RX ADMIN — VANCOMYCIN HYDROCHLORIDE 1000 MG: 1 INJECTION, POWDER, LYOPHILIZED, FOR SOLUTION INTRAVENOUS at 13:30

## 2024-09-25 RX ADMIN — GABAPENTIN 100 MG: 100 CAPSULE ORAL at 09:08

## 2024-09-25 RX ADMIN — VANCOMYCIN HYDROCHLORIDE 1000 MG: 1 INJECTION, POWDER, LYOPHILIZED, FOR SOLUTION INTRAVENOUS at 02:14

## 2024-09-25 RX ADMIN — ATORVASTATIN CALCIUM 40 MG: 40 TABLET, FILM COATED ORAL at 20:52

## 2024-09-25 RX ADMIN — OXYCODONE 10 MG: 5 TABLET ORAL at 09:08

## 2024-09-25 RX ADMIN — OXYCODONE 10 MG: 5 TABLET ORAL at 04:26

## 2024-09-25 RX ADMIN — ACETAMINOPHEN 650 MG: 325 TABLET ORAL at 11:59

## 2024-09-25 RX ADMIN — OXYCODONE 10 MG: 5 TABLET ORAL at 13:24

## 2024-09-25 RX ADMIN — SODIUM CHLORIDE, PRESERVATIVE FREE 10 ML: 5 INJECTION INTRAVENOUS at 09:10

## 2024-09-25 RX ADMIN — ACETAMINOPHEN 650 MG: 325 TABLET ORAL at 06:57

## 2024-09-25 RX ADMIN — OXYCODONE 10 MG: 5 TABLET ORAL at 00:01

## 2024-09-25 RX ADMIN — SODIUM CHLORIDE, PRESERVATIVE FREE 10 ML: 5 INJECTION INTRAVENOUS at 20:52

## 2024-09-25 RX ADMIN — ACETAMINOPHEN 650 MG: 325 TABLET ORAL at 02:16

## 2024-09-25 RX ADMIN — GABAPENTIN 100 MG: 100 CAPSULE ORAL at 20:52

## 2024-09-25 RX ADMIN — SENNOSIDES 8.6 MG: 8.6 TABLET, FILM COATED ORAL at 09:08

## 2024-09-25 RX ADMIN — ACETAMINOPHEN 650 MG: 325 TABLET ORAL at 17:51

## 2024-09-25 RX ADMIN — OXYCODONE 10 MG: 5 TABLET ORAL at 17:51

## 2024-09-25 RX ADMIN — OXYCODONE 10 MG: 5 TABLET ORAL at 22:24

## 2024-09-25 ASSESSMENT — PAIN SCALES - GENERAL
PAINLEVEL_OUTOF10: 8
PAINLEVEL_OUTOF10: 2
PAINLEVEL_OUTOF10: 5
PAINLEVEL_OUTOF10: 5
PAINLEVEL_OUTOF10: 4
PAINLEVEL_OUTOF10: 3
PAINLEVEL_OUTOF10: 7
PAINLEVEL_OUTOF10: 7
PAINLEVEL_OUTOF10: 5
PAINLEVEL_OUTOF10: 4

## 2024-09-25 ASSESSMENT — PAIN DESCRIPTION - LOCATION
LOCATION: BACK

## 2024-09-25 ASSESSMENT — PAIN DESCRIPTION - DESCRIPTORS
DESCRIPTORS: ACHING

## 2024-09-26 VITALS
OXYGEN SATURATION: 85 % | RESPIRATION RATE: 12 BRPM | DIASTOLIC BLOOD PRESSURE: 59 MMHG | TEMPERATURE: 98.1 F | HEIGHT: 60 IN | WEIGHT: 166 LBS | HEART RATE: 62 BPM | BODY MASS INDEX: 32.59 KG/M2 | SYSTOLIC BLOOD PRESSURE: 109 MMHG

## 2024-09-26 PROCEDURE — 97530 THERAPEUTIC ACTIVITIES: CPT

## 2024-09-26 PROCEDURE — 97116 GAIT TRAINING THERAPY: CPT

## 2024-09-26 PROCEDURE — 6370000000 HC RX 637 (ALT 250 FOR IP): Performed by: NEUROLOGICAL SURGERY

## 2024-09-26 PROCEDURE — 99024 POSTOP FOLLOW-UP VISIT: CPT | Performed by: PHYSICIAN ASSISTANT

## 2024-09-26 RX ORDER — OXYCODONE HYDROCHLORIDE 5 MG/1
5 TABLET ORAL EVERY 4 HOURS PRN
Qty: 20 TABLET | Refills: 0 | Status: SHIPPED | OUTPATIENT
Start: 2024-09-26 | End: 2024-10-03

## 2024-09-26 RX ORDER — SENNA AND DOCUSATE SODIUM 50; 8.6 MG/1; MG/1
1 TABLET, FILM COATED ORAL DAILY
Qty: 20 TABLET | Refills: 0 | Status: SHIPPED | OUTPATIENT
Start: 2024-09-26

## 2024-09-26 RX ADMIN — ACETAMINOPHEN 650 MG: 325 TABLET ORAL at 06:57

## 2024-09-26 RX ADMIN — OXYCODONE 10 MG: 5 TABLET ORAL at 02:27

## 2024-09-26 RX ADMIN — GABAPENTIN 100 MG: 100 CAPSULE ORAL at 08:58

## 2024-09-26 RX ADMIN — CETIRIZINE HYDROCHLORIDE 10 MG: 10 TABLET, FILM COATED ORAL at 08:58

## 2024-09-26 RX ADMIN — ACETAMINOPHEN 650 MG: 325 TABLET ORAL at 02:26

## 2024-09-26 RX ADMIN — ACETAMINOPHEN 650 MG: 325 TABLET ORAL at 13:20

## 2024-09-26 RX ADMIN — POLYETHYLENE GLYCOL 3350 17 G: 17 POWDER, FOR SOLUTION ORAL at 09:04

## 2024-09-26 RX ADMIN — OXYCODONE 5 MG: 5 TABLET ORAL at 10:29

## 2024-09-26 RX ADMIN — SENNOSIDES 8.6 MG: 8.6 TABLET, FILM COATED ORAL at 08:58

## 2024-09-26 RX ADMIN — OXYCODONE 5 MG: 5 TABLET ORAL at 06:58

## 2024-09-26 ASSESSMENT — PAIN DESCRIPTION - LOCATION
LOCATION: BACK

## 2024-09-26 ASSESSMENT — PAIN SCALES - GENERAL
PAINLEVEL_OUTOF10: 3
PAINLEVEL_OUTOF10: 8
PAINLEVEL_OUTOF10: 5
PAINLEVEL_OUTOF10: 6

## 2024-09-26 ASSESSMENT — PAIN DESCRIPTION - DESCRIPTORS
DESCRIPTORS: ACHING

## 2024-09-26 ASSESSMENT — PAIN DESCRIPTION - ORIENTATION: ORIENTATION: LOWER

## 2024-09-30 ENCOUNTER — TELEPHONE (OUTPATIENT)
Age: 69
End: 2024-09-30

## 2024-09-30 NOTE — TELEPHONE ENCOUNTER
Patient called stating that she is still experiencing some pain after her back surgery. Patient has completed the Oxycodone that was given to her by her surgeon. Patient is hoping that we can give her  refill on this medication if possible. Patient is also hoping to get a call back on weather or not this is possible.    Best call back number  [phone]

## 2024-11-04 RX ORDER — ATORVASTATIN CALCIUM 40 MG/1
40 TABLET, FILM COATED ORAL DAILY
Qty: 90 TABLET | Refills: 3 | Status: SHIPPED | OUTPATIENT
Start: 2024-11-04

## 2024-11-04 RX ORDER — VALSARTAN AND HYDROCHLOROTHIAZIDE 320; 12.5 MG/1; MG/1
TABLET, FILM COATED ORAL
Qty: 90 TABLET | Refills: 3 | Status: SHIPPED | OUTPATIENT
Start: 2024-11-04

## 2024-11-04 NOTE — TELEPHONE ENCOUNTER
PCP: Debbie Oneil MD    Last appt: 9/20/2024       Future Appointments   Date Time Provider Department Center   11/19/2024 12:30 PM [facility] [place] [facility]       Requested Prescriptions     Pending Prescriptions Disp Refills    valsartan-hydroCHLOROthiazide (DIOVAN-HCT) 320-12.5 MG per tablet [Pharmacy Med Name: Valsartan-hydroCHLOROthiazide 320-12.5 MG Oral Tablet] 90 tablet 0     Sig: Take 1 tablet by mouth once daily    atorvastatin (LIPITOR) 40 MG tablet [Pharmacy Med Name: Atorvastatin Calcium 40 MG Oral Tablet] 90 tablet 0     Sig: Take 1 tablet by mouth once daily       Prior labs and Blood pressures:  BP Readings from Last 3 Encounters:   09/26/24 (!) 109/59   09/18/24 121/78   09/20/24 126/78     Lab Results   Component Value Date/Time     09/25/2024 06:01 AM    K 3.5 09/25/2024 06:01 AM     09/25/2024 06:01 AM    CO2 28 09/25/2024 06:01 AM    BUN 10 09/25/2024 06:01 AM    GFRAA >60 02/02/2022 10:00 AM     No results found for: \"HBA1C\", \"[id number]\"  Lab Results   Component Value Date/Time    CHOL 166 09/20/2024 11:37 AM    HDL 49 09/20/2024 11:37 AM    LDL 97.4 09/20/2024 11:37 AM    LDL 76.2 08/14/2023 01:58 PM    VLDL 19.6 09/20/2024 11:37 AM     No results found for: \"VITD3\"    No results found for: \"TSH\", \"TSH2\", \"TSH3\"

## 2024-11-19 ENCOUNTER — HOSPITAL ENCOUNTER (OUTPATIENT)
Facility: HOSPITAL | Age: 69
Discharge: HOME | End: 2024-11-22
Payer: MEDICARE

## 2024-11-19 DIAGNOSIS — Z12.31 VISIT FOR SCREENING MAMMOGRAM: ICD-10-CM

## 2024-11-19 PROCEDURE — 77063 BREAST TOMOSYNTHESIS BI: CPT

## 2025-02-17 ENCOUNTER — OFFICE VISIT (OUTPATIENT)
Age: 70
End: 2025-02-17
Payer: MEDICARE

## 2025-02-17 VITALS
WEIGHT: 165.4 LBS | HEIGHT: 60 IN | SYSTOLIC BLOOD PRESSURE: 116 MMHG | DIASTOLIC BLOOD PRESSURE: 68 MMHG | TEMPERATURE: 96.9 F | HEART RATE: 83 BPM | OXYGEN SATURATION: 96 % | RESPIRATION RATE: 18 BRPM | BODY MASS INDEX: 32.47 KG/M2

## 2025-02-17 DIAGNOSIS — J06.9 UPPER RESPIRATORY TRACT INFECTION, UNSPECIFIED TYPE: ICD-10-CM

## 2025-02-17 DIAGNOSIS — R30.0 DYSURIA: Primary | ICD-10-CM

## 2025-02-17 DIAGNOSIS — Z78.0 POSTMENOPAUSAL: ICD-10-CM

## 2025-02-17 DIAGNOSIS — Z13.820 SCREENING FOR OSTEOPOROSIS: ICD-10-CM

## 2025-02-17 DIAGNOSIS — L50.3 DERMATOGRAPHISM: ICD-10-CM

## 2025-02-17 DIAGNOSIS — E04.1 THYROID NODULE: ICD-10-CM

## 2025-02-17 DIAGNOSIS — I10 ESSENTIAL (PRIMARY) HYPERTENSION: ICD-10-CM

## 2025-02-17 LAB
BILIRUBIN, URINE, POC: NEGATIVE
BLOOD URINE, POC: NEGATIVE
EXP DATE SOLUTION: NORMAL
EXP DATE SWAB: NORMAL
EXPIRATION DATE: NORMAL
GLUCOSE URINE, POC: NEGATIVE
KETONES, URINE, POC: NEGATIVE
LEUKOCYTE ESTERASE, URINE, POC: NORMAL
LOT NUMBER POC: NORMAL
LOT NUMBER SOLUTION: NORMAL
LOT NUMBER SWAB: NORMAL
NITRITE, URINE, POC: NEGATIVE
PH, URINE, POC: 6 (ref 4.6–8)
PROTEIN,URINE, POC: NEGATIVE
QUICKVUE INFLUENZA TEST: NEGATIVE
SARS-COV-2 RNA, POC: NEGATIVE
SPECIFIC GRAVITY, URINE, POC: 1.02 (ref 1–1.03)
T4 FREE SERPL-MCNC: 0.9 NG/DL (ref 0.8–1.5)
TSH SERPL DL<=0.05 MIU/L-ACNC: 2.25 UIU/ML (ref 0.36–3.74)
URINALYSIS CLARITY, POC: CLEAR
URINALYSIS COLOR, POC: YELLOW
UROBILINOGEN, POC: NORMAL MG/DL
VALID INTERNAL CONTROL, POC: YES

## 2025-02-17 PROCEDURE — 87804 INFLUENZA ASSAY W/OPTIC: CPT | Performed by: FAMILY MEDICINE

## 2025-02-17 PROCEDURE — 99214 OFFICE O/P EST MOD 30 MIN: CPT | Performed by: FAMILY MEDICINE

## 2025-02-17 PROCEDURE — 87635 SARS-COV-2 COVID-19 AMP PRB: CPT | Performed by: FAMILY MEDICINE

## 2025-02-17 PROCEDURE — 81001 URINALYSIS AUTO W/SCOPE: CPT | Performed by: FAMILY MEDICINE

## 2025-02-17 RX ORDER — ACETAMINOPHEN 500 MG
1000 TABLET ORAL EVERY 6 HOURS PRN
COMMUNITY

## 2025-02-17 RX ORDER — DOXYCYCLINE 100 MG/1
CAPSULE ORAL
COMMUNITY
Start: 2025-02-14

## 2025-02-17 SDOH — ECONOMIC STABILITY: FOOD INSECURITY: WITHIN THE PAST 12 MONTHS, YOU WORRIED THAT YOUR FOOD WOULD RUN OUT BEFORE YOU GOT MONEY TO BUY MORE.: NEVER TRUE

## 2025-02-17 SDOH — ECONOMIC STABILITY: FOOD INSECURITY: WITHIN THE PAST 12 MONTHS, THE FOOD YOU BOUGHT JUST DIDN'T LAST AND YOU DIDN'T HAVE MONEY TO GET MORE.: NEVER TRUE

## 2025-02-17 ASSESSMENT — PATIENT HEALTH QUESTIONNAIRE - PHQ9
SUM OF ALL RESPONSES TO PHQ QUESTIONS 1-9: 0
SUM OF ALL RESPONSES TO PHQ9 QUESTIONS 1 & 2: 0
SUM OF ALL RESPONSES TO PHQ QUESTIONS 1-9: 0
2. FEELING DOWN, DEPRESSED OR HOPELESS: NOT AT ALL
1. LITTLE INTEREST OR PLEASURE IN DOING THINGS: NOT AT ALL

## 2025-02-17 ASSESSMENT — VISUAL ACUITY: OU: 1

## 2025-02-17 NOTE — PROGRESS NOTES
Patient Name: Tosha Leslie   MRN: [id number]    ASSESSMENT AND PLAN  Tosha Leslie is a 69 y.o. female who presents today for:    Assessment & Plan  Dysuria   UA with trace leuks; mildly symptomatic. Will await urine culture.    Orders:    AMB POC URINALYSIS DIP STICK AUTO W/ MICRO    Culture, Urine; Future    Culture, Urine    Upper respiratory tract infection, unspecified type  Negative Flu/COVID. Already on doxycycline. Discussed diagnosis & treatment options, most likely viral at this time, reviewed the importance of avoiding unnecessary antibiotic therapy, Flonase, humidifier.  Reviewed signs and symptoms that would indicate a worsening medical condition which would require immediate evaluation and treatment; patient expressed understanding of plan.      Orders:    AMB POC RAPID INFLUENZA TEST    AMB POC COVID-19 COV    Essential (primary) hypertension   Chronic, at goal (stable), continue current treatment plan         Postmenopausal     Orders:    DEXA BONE DENSITY AXIAL SKELETON; Future    Thyroid nodule   Monitored by specialist- no acute findings meriting change in the plan    Orders:    TSH + Free T4 Panel; Future    TSH + Free T4 Panel    Screening for osteoporosis     Orders:    DEXA BONE DENSITY AXIAL SKELETON; Future    Dermatographism   Monitored by specialist- no acute findings meriting change in the plan           No orders of the defined types were placed in this encounter.       Medications Discontinued During This Encounter   Medication Reason    senna (SENOKOT) 8.6 MG tablet ERROR    sennosides-docusate sodium (SENOKOT-S) 8.6-50 MG tablet ERROR         SUBJECTIVE  Tosha Leslie is a 69 y.o. female who presents with the following:     Reports 2-week history of intermittent burning when she urinates.  Took Azo which does help.  Denies any fevers, flank pain, abdominal pain.  Reports 4-day history of URI symptoms including cough, sore throat, and facial pain.  Currently on doxycycline for a

## 2025-02-17 NOTE — PROGRESS NOTES
Chief Complaint   Patient presents with    Urinary Pain    Cough     Since Friday     Nasal Congestion    Other     Whole body itching in the evening      \"Have you been to the ER, urgent care clinic since your last visit?  Hospitalized since your last visit?\"    NO    “Have you seen or consulted any other health care providers outside of [facility] since your last visit?”    NO         Financial Resource Strain: Low Risk  (9/20/2024)    Overall Financial Resource Strain (CARDIA)     Difficulty of Paying Living Expenses: Not very hard      Food Insecurity: No Food Insecurity (2/17/2025)    Hunger Vital Sign     Worried About Running Out of Food in the Last Year: Never true     Ran Out of Food in the Last Year: Never true            2/17/2025     8:21 AM   PHQ-9    Little interest or pleasure in doing things 0   Feeling down, depressed, or hopeless 0   PHQ-2 Score 0   PHQ-9 Total Score 0       Health Maintenance Due   Topic Date Due    Pneumococcal 50+ years Vaccine (1 of 1 - PCV) Never done    Respiratory Syncytial Virus (RSV) Pregnant or age 60 yrs+ (1 - Risk 60-74 years 1-dose series) Never done    Flu vaccine (1) 08/01/2024    COVID-19 Vaccine (6 - 2024-25 season) 09/01/2024    DEXA (modify frequency per FRAX score)  11/02/2024

## 2025-02-19 LAB
BACTERIA SPEC CULT: NORMAL
SERVICE CMNT-IMP: NORMAL

## 2025-02-24 ENCOUNTER — HOSPITAL ENCOUNTER (OUTPATIENT)
Facility: HOSPITAL | Age: 70
Discharge: HOME | End: 2025-02-27
Payer: MEDICARE

## 2025-02-24 DIAGNOSIS — Z78.0 POSTMENOPAUSAL: ICD-10-CM

## 2025-02-24 DIAGNOSIS — Z13.820 SCREENING FOR OSTEOPOROSIS: ICD-10-CM

## 2025-02-24 PROCEDURE — 77080 DXA BONE DENSITY AXIAL: CPT

## 2025-03-18 ENCOUNTER — HOSPITAL ENCOUNTER (OUTPATIENT)
Facility: HOSPITAL | Age: 70
Discharge: HOME | End: 2025-03-21
Attending: OTOLARYNGOLOGY
Payer: MEDICARE

## 2025-03-18 DIAGNOSIS — Z78.9 NON-SMOKER: ICD-10-CM

## 2025-03-18 DIAGNOSIS — E04.1 THYROID NODULE: ICD-10-CM

## 2025-03-18 PROCEDURE — 76536 US EXAM OF HEAD AND NECK: CPT

## 2025-06-03 NOTE — ADDENDUM NOTE
Addended by: Caryl Kline on: 1/6/2017 03:21 PM     Modules accepted: Orders I reviewed the H&P, I examined the patient, and there are no changes in the patient's condition.

## (undated) DEVICE — Device

## (undated) DEVICE — BLADE,CARBON-STEEL,11,STRL,DISPOSABLE,TB: Brand: MEDLINE

## (undated) DEVICE — SPHERE STRL PASSIVE MARKER 60

## (undated) DEVICE — POSITIONER HD REST FOAM CMFRT TCH

## (undated) DEVICE — CANNULA INJ L2.5IN BLNT TIP 3MM CLR BODY W/ 1 W VLV DLP

## (undated) DEVICE — LAMINECTOMY-SMH: Brand: MEDLINE INDUSTRIES, INC.

## (undated) DEVICE — BONE TAMP KIT KPX153NB AF X2 15/3

## (undated) DEVICE — SUTURE VICRYL + SZ 2-0 L18IN ABSRB UD CP-2 L26MM 1/2 CIR REV VCP762D

## (undated) DEVICE — GLOVE SURG SZ 65 L12IN FNGR THK94MIL STD WHT LTX FREE

## (undated) DEVICE — 1LYRTR 16FR10ML100%SIL UMS SNP: Brand: MEDLINE INDUSTRIES, INC.

## (undated) DEVICE — DRESSING BORDERED ADH GZ UNIV GEN USE 8INX4IN AND 6INX2IN

## (undated) DEVICE — X-RAY DETECTABLE SPONGES,16 PLY: Brand: VISTEC

## (undated) DEVICE — INTENT OT USE PROVIDES A STERILE INTERFACE BETWEEN THE OPERATING ROOM SURGICAL LAMPS (NON-STERILE) AND THE SURGEON OR STAFF WORKING IN THE STERILE FIELD.: Brand: ASPEN® ALC PLUS LIGHT HANDLE COVER

## (undated) DEVICE — SOLUTION IV 250ML 0.9% SOD CHL CLR INJ FLX BG CONT PRT CLSR

## (undated) DEVICE — BONE BIOPSY DEVICE F07A TAPERED SIZE 2: Brand: MEDTRONIC REUSABLE INSTRUMENTS

## (undated) DEVICE — BONE CEMENT CX01B KYPHON XPEDE W MXR US: Brand: KYPHON® XPEDE™ BONE CEMENT AND KYPHON® MIXER PACK

## (undated) DEVICE — SHAFT 7480741T ADAPTER DRIVER QC SHAFT: Brand: CD HORIZON® FENESTRATED SCREW SET

## (undated) DEVICE — HYPODERMIC SAFETY NEEDLE: Brand: MAGELLAN

## (undated) DEVICE — SUTURE MONOCRYL + SZ 4-0 L27IN ABSRB UD L19MM PS-2 3/8 CIR MCP426H

## (undated) DEVICE — BIPOLAR IRRIGATOR INTEGRATED TUBING AND BIPOLAR CORD SET, DISPOSABLE

## (undated) DEVICE — COVER,TABLE,HEAVY DUTY,60"X90",STRL: Brand: MEDLINE

## (undated) DEVICE — TOOL 14MH30 LEGEND 14CM 3MM: Brand: MIDAS REX ™

## (undated) DEVICE — SPONGE GZ W4XL4IN COT 12 PLY TYP VII WVN C FLD DSGN STERILE

## (undated) DEVICE — KIT EVAC 400CC DIA1/8IN H PAT 12.5IN 3 SPR RND SHP PVC DRN

## (undated) DEVICE — BONE WAX WHITE: Brand: BONE WAX WHITE

## (undated) DEVICE — SUTURE VICRYL SZ 0 L18IN ABSRB VLT L36MM CT-1 1/2 CIR J740D

## (undated) DEVICE — STERILE-Z SURGICAL PATIENT COVERS CLEAR POLYETHYLENE STERILE UNIVERSAL FIT 10 PER CASE: Brand: STERILE-Z

## (undated) DEVICE — FLOSEAL WITH RECOTHROM - 10ML.: Brand: FLOSEAL HEMOSTATIC MATRIX

## (undated) DEVICE — ASTOUND STANDARD SURGICAL GOWN, XXL: Brand: CONVERTORS

## (undated) DEVICE — SUTURE VICRYL + SZ 0 L18IN ABSRB VLT L36MM CT-1 1/2 CIR VCP740D